# Patient Record
Sex: FEMALE | Race: BLACK OR AFRICAN AMERICAN | URBAN - METROPOLITAN AREA
[De-identification: names, ages, dates, MRNs, and addresses within clinical notes are randomized per-mention and may not be internally consistent; named-entity substitution may affect disease eponyms.]

---

## 2017-08-15 ENCOUNTER — APPOINTMENT (OUTPATIENT)
Dept: CARDIOLOGY | Facility: CLINIC | Age: 22
DRG: 176 | End: 2017-08-15
Attending: EMERGENCY MEDICINE
Payer: COMMERCIAL

## 2017-08-15 ENCOUNTER — APPOINTMENT (OUTPATIENT)
Dept: CT IMAGING | Facility: CLINIC | Age: 22
DRG: 176 | End: 2017-08-15
Attending: EMERGENCY MEDICINE
Payer: COMMERCIAL

## 2017-08-15 ENCOUNTER — APPOINTMENT (OUTPATIENT)
Dept: GENERAL RADIOLOGY | Facility: CLINIC | Age: 22
DRG: 176 | End: 2017-08-15
Attending: EMERGENCY MEDICINE
Payer: COMMERCIAL

## 2017-08-15 ENCOUNTER — HOSPITAL ENCOUNTER (INPATIENT)
Facility: CLINIC | Age: 22
LOS: 2 days | Discharge: HOME | DRG: 176 | End: 2017-08-19
Attending: EMERGENCY MEDICINE | Admitting: INTERNAL MEDICINE
Payer: COMMERCIAL

## 2017-08-15 DIAGNOSIS — F41.9 ANXIETY: ICD-10-CM

## 2017-08-15 DIAGNOSIS — Z30.011 ENCOUNTER FOR INITIAL PRESCRIPTION OF CONTRACEPTIVE PILLS: Primary | ICD-10-CM

## 2017-08-15 DIAGNOSIS — K59.03 DRUG-INDUCED CONSTIPATION: ICD-10-CM

## 2017-08-15 DIAGNOSIS — I26.99 PE (PULMONARY THROMBOEMBOLISM) (H): ICD-10-CM

## 2017-08-15 DIAGNOSIS — I26.99 OTHER PULMONARY EMBOLISM WITHOUT ACUTE COR PULMONALE, UNSPECIFIED CHRONICITY (H): ICD-10-CM

## 2017-08-15 DIAGNOSIS — R07.89 ATYPICAL CHEST PAIN: ICD-10-CM

## 2017-08-15 LAB
ALBUMIN SERPL-MCNC: 3.4 G/DL (ref 3.4–5)
ALP SERPL-CCNC: 58 U/L (ref 40–150)
ALT SERPL W P-5'-P-CCNC: 28 U/L (ref 0–50)
ANION GAP SERPL CALCULATED.3IONS-SCNC: 6 MMOL/L (ref 3–14)
APTT PPP: 40 SEC (ref 22–37)
APTT PPP: NORMAL SEC (ref 22–37)
AST SERPL W P-5'-P-CCNC: 26 U/L (ref 0–45)
BASOPHILS # BLD AUTO: 0 10E9/L (ref 0–0.2)
BASOPHILS # BLD AUTO: 0 10E9/L (ref 0–0.2)
BASOPHILS NFR BLD AUTO: 0.2 %
BASOPHILS NFR BLD AUTO: 0.3 %
BILIRUB DIRECT SERPL-MCNC: <0.1 MG/DL (ref 0–0.2)
BILIRUB SERPL-MCNC: 0.2 MG/DL (ref 0.2–1.3)
BUN SERPL-MCNC: 6 MG/DL (ref 7–30)
CALCIUM SERPL-MCNC: 8.4 MG/DL (ref 8.5–10.1)
CHLORIDE SERPL-SCNC: 106 MMOL/L (ref 94–109)
CO2 SERPL-SCNC: 27 MMOL/L (ref 20–32)
CREAT SERPL-MCNC: 0.62 MG/DL (ref 0.52–1.04)
D DIMER PPP FEU-MCNC: 3.2 UG/ML FEU (ref 0–0.5)
DIFFERENTIAL METHOD BLD: ABNORMAL
DIFFERENTIAL METHOD BLD: NORMAL
EOSINOPHIL # BLD AUTO: 0.4 10E9/L (ref 0–0.7)
EOSINOPHIL # BLD AUTO: 0.5 10E9/L (ref 0–0.7)
EOSINOPHIL NFR BLD AUTO: 6 %
EOSINOPHIL NFR BLD AUTO: 6.4 %
ERYTHROCYTE [DISTWIDTH] IN BLOOD BY AUTOMATED COUNT: 12.3 % (ref 10–15)
ERYTHROCYTE [DISTWIDTH] IN BLOOD BY AUTOMATED COUNT: 12.3 % (ref 10–15)
GFR SERPL CREATININE-BSD FRML MDRD: >90 ML/MIN/1.7M2
GLUCOSE SERPL-MCNC: 94 MG/DL (ref 70–99)
HCG UR QL: NEGATIVE
HCT VFR BLD AUTO: 34.5 % (ref 35–47)
HCT VFR BLD AUTO: 36.2 % (ref 35–47)
HGB BLD-MCNC: 11.8 G/DL (ref 11.7–15.7)
HGB BLD-MCNC: 12.6 G/DL (ref 11.7–15.7)
IMM GRANULOCYTES # BLD: 0 10E9/L (ref 0–0.4)
IMM GRANULOCYTES # BLD: 0 10E9/L (ref 0–0.4)
IMM GRANULOCYTES NFR BLD: 0.1 %
IMM GRANULOCYTES NFR BLD: 0.2 %
INR PPP: 1.14 (ref 0.86–1.14)
INR PPP: NORMAL (ref 0.86–1.14)
INTERNAL QC OK POCT: YES
INTERPRETATION ECG - MUSE: NORMAL
LYMPHOCYTES # BLD AUTO: 1.3 10E9/L (ref 0.8–5.3)
LYMPHOCYTES # BLD AUTO: 2.4 10E9/L (ref 0.8–5.3)
LYMPHOCYTES NFR BLD AUTO: 19.9 %
LYMPHOCYTES NFR BLD AUTO: 29.3 %
MCH RBC QN AUTO: 30.8 PG (ref 26.5–33)
MCH RBC QN AUTO: 31 PG (ref 26.5–33)
MCHC RBC AUTO-ENTMCNC: 34.2 G/DL (ref 31.5–36.5)
MCHC RBC AUTO-ENTMCNC: 34.8 G/DL (ref 31.5–36.5)
MCV RBC AUTO: 89 FL (ref 78–100)
MCV RBC AUTO: 90 FL (ref 78–100)
MONOCYTES # BLD AUTO: 0.4 10E9/L (ref 0–1.3)
MONOCYTES # BLD AUTO: 0.7 10E9/L (ref 0–1.3)
MONOCYTES NFR BLD AUTO: 6.1 %
MONOCYTES NFR BLD AUTO: 7.9 %
NEUTROPHILS # BLD AUTO: 4.3 10E9/L (ref 1.6–8.3)
NEUTROPHILS # BLD AUTO: 4.6 10E9/L (ref 1.6–8.3)
NEUTROPHILS NFR BLD AUTO: 56.5 %
NEUTROPHILS NFR BLD AUTO: 67.1 %
NRBC # BLD AUTO: 0 10*3/UL
NRBC # BLD AUTO: 0 10*3/UL
NRBC BLD AUTO-RTO: 0 /100
NRBC BLD AUTO-RTO: 0 /100
PLATELET # BLD AUTO: 185 10E9/L (ref 150–450)
PLATELET # BLD AUTO: 187 10E9/L (ref 150–450)
POTASSIUM SERPL-SCNC: 4.2 MMOL/L (ref 3.4–5.3)
PROT SERPL-MCNC: 7 G/DL (ref 6.8–8.8)
RADIOLOGIST FLAGS: ABNORMAL
RBC # BLD AUTO: 3.83 10E12/L (ref 3.8–5.2)
RBC # BLD AUTO: 4.06 10E12/L (ref 3.8–5.2)
RETICS # AUTO: 37.2 10E9/L (ref 25–95)
RETICS/RBC NFR AUTO: 1 % (ref 0.5–2)
SODIUM SERPL-SCNC: 138 MMOL/L (ref 133–144)
TROPONIN I SERPL-MCNC: <0.015 UG/L (ref 0–0.04)
WBC # BLD AUTO: 6.4 10E9/L (ref 4–11)
WBC # BLD AUTO: 8.2 10E9/L (ref 4–11)

## 2017-08-15 PROCEDURE — 99207 ZZC APP CREDIT; MD BILLING SHARED VISIT: CPT | Mod: 25 | Performed by: EMERGENCY MEDICINE

## 2017-08-15 PROCEDURE — 85610 PROTHROMBIN TIME: CPT | Performed by: EMERGENCY MEDICINE

## 2017-08-15 PROCEDURE — 99220 ZZC INITIAL OBSERVATION CARE,LEVL III: CPT | Mod: 25 | Performed by: FAMILY MEDICINE

## 2017-08-15 PROCEDURE — 36415 COLL VENOUS BLD VENIPUNCTURE: CPT | Performed by: FAMILY MEDICINE

## 2017-08-15 PROCEDURE — 25000128 H RX IP 250 OP 636: Performed by: STUDENT IN AN ORGANIZED HEALTH CARE EDUCATION/TRAINING PROGRAM

## 2017-08-15 PROCEDURE — 40000264 ECHO COMPLETE WITH OPTISON

## 2017-08-15 PROCEDURE — 96372 THER/PROPH/DIAG INJ SC/IM: CPT

## 2017-08-15 PROCEDURE — 85307 ASSAY ACTIVATED PROTEIN C: CPT | Performed by: NURSE PRACTITIONER

## 2017-08-15 PROCEDURE — 93010 ELECTROCARDIOGRAM REPORT: CPT | Mod: Z6 | Performed by: EMERGENCY MEDICINE

## 2017-08-15 PROCEDURE — 96376 TX/PRO/DX INJ SAME DRUG ADON: CPT

## 2017-08-15 PROCEDURE — 85300 ANTITHROMBIN III ACTIVITY: CPT | Performed by: NURSE PRACTITIONER

## 2017-08-15 PROCEDURE — 93005 ELECTROCARDIOGRAM TRACING: CPT

## 2017-08-15 PROCEDURE — 96361 HYDRATE IV INFUSION ADD-ON: CPT | Mod: 59

## 2017-08-15 PROCEDURE — 85307 ASSAY ACTIVATED PROTEIN C: CPT | Performed by: EMERGENCY MEDICINE

## 2017-08-15 PROCEDURE — 40000141 ZZH STATISTIC PERIPHERAL IV START W/O US GUIDANCE

## 2017-08-15 PROCEDURE — 96374 THER/PROPH/DIAG INJ IV PUSH: CPT

## 2017-08-15 PROCEDURE — 71260 CT THORAX DX C+: CPT

## 2017-08-15 PROCEDURE — 85730 THROMBOPLASTIN TIME PARTIAL: CPT | Performed by: EMERGENCY MEDICINE

## 2017-08-15 PROCEDURE — 87210 SMEAR WET MOUNT SALINE/INK: CPT | Performed by: STUDENT IN AN ORGANIZED HEALTH CARE EDUCATION/TRAINING PROGRAM

## 2017-08-15 PROCEDURE — 71020 XR CHEST 2 VW: CPT

## 2017-08-15 PROCEDURE — 25000128 H RX IP 250 OP 636: Performed by: EMERGENCY MEDICINE

## 2017-08-15 PROCEDURE — 25000125 ZZHC RX 250: Performed by: STUDENT IN AN ORGANIZED HEALTH CARE EDUCATION/TRAINING PROGRAM

## 2017-08-15 PROCEDURE — 85025 COMPLETE CBC W/AUTO DIFF WBC: CPT | Performed by: EMERGENCY MEDICINE

## 2017-08-15 PROCEDURE — 85045 AUTOMATED RETICULOCYTE COUNT: CPT | Performed by: NURSE PRACTITIONER

## 2017-08-15 PROCEDURE — 80048 BASIC METABOLIC PNL TOTAL CA: CPT | Performed by: EMERGENCY MEDICINE

## 2017-08-15 PROCEDURE — 25000132 ZZH RX MED GY IP 250 OP 250 PS 637: Performed by: EMERGENCY MEDICINE

## 2017-08-15 PROCEDURE — 85379 FIBRIN DEGRADATION QUANT: CPT | Performed by: EMERGENCY MEDICINE

## 2017-08-15 PROCEDURE — 80076 HEPATIC FUNCTION PANEL: CPT | Performed by: EMERGENCY MEDICINE

## 2017-08-15 PROCEDURE — 99207 ZZC APP CREDIT; MD BILLING SHARED VISIT: CPT | Mod: Z6 | Performed by: NURSE PRACTITIONER

## 2017-08-15 PROCEDURE — 25000128 H RX IP 250 OP 636: Performed by: NURSE PRACTITIONER

## 2017-08-15 PROCEDURE — 85730 THROMBOPLASTIN TIME PARTIAL: CPT | Performed by: FAMILY MEDICINE

## 2017-08-15 PROCEDURE — 93306 TTE W/DOPPLER COMPLETE: CPT | Mod: 26 | Performed by: INTERNAL MEDICINE

## 2017-08-15 PROCEDURE — 40000611 ZZHCL STATISTIC MORPHOLOGY W/INTERP HEMEPATH TC 85060: Performed by: NURSE PRACTITIONER

## 2017-08-15 PROCEDURE — 84484 ASSAY OF TROPONIN QUANT: CPT | Performed by: EMERGENCY MEDICINE

## 2017-08-15 PROCEDURE — 96375 TX/PRO/DX INJ NEW DRUG ADDON: CPT

## 2017-08-15 PROCEDURE — 36415 COLL VENOUS BLD VENIPUNCTURE: CPT | Performed by: NURSE PRACTITIONER

## 2017-08-15 PROCEDURE — 99285 EMERGENCY DEPT VISIT HI MDM: CPT | Mod: 25

## 2017-08-15 PROCEDURE — 25500064 ZZH RX 255 OP 636: Performed by: EMERGENCY MEDICINE

## 2017-08-15 PROCEDURE — 81025 URINE PREGNANCY TEST: CPT | Performed by: EMERGENCY MEDICINE

## 2017-08-15 PROCEDURE — 85610 PROTHROMBIN TIME: CPT | Performed by: FAMILY MEDICINE

## 2017-08-15 PROCEDURE — 25000132 ZZH RX MED GY IP 250 OP 250 PS 637: Performed by: NURSE PRACTITIONER

## 2017-08-15 PROCEDURE — G0378 HOSPITAL OBSERVATION PER HR: HCPCS

## 2017-08-15 PROCEDURE — 85025 COMPLETE CBC W/AUTO DIFF WBC: CPT | Performed by: NURSE PRACTITIONER

## 2017-08-15 PROCEDURE — 85300 ANTITHROMBIN III ACTIVITY: CPT | Performed by: EMERGENCY MEDICINE

## 2017-08-15 RX ORDER — LEVONORGESTREL/ETHIN.ESTRADIOL 0.1-0.02MG
1 TABLET ORAL DAILY
Status: ON HOLD | COMMUNITY
End: 2017-08-16

## 2017-08-15 RX ORDER — HYDROMORPHONE HYDROCHLORIDE 1 MG/ML
.3-.5 INJECTION, SOLUTION INTRAMUSCULAR; INTRAVENOUS; SUBCUTANEOUS
Status: DISCONTINUED | OUTPATIENT
Start: 2017-08-15 | End: 2017-08-16

## 2017-08-15 RX ORDER — HYDROMORPHONE HYDROCHLORIDE 1 MG/ML
.3-.5 INJECTION, SOLUTION INTRAMUSCULAR; INTRAVENOUS; SUBCUTANEOUS
Status: DISCONTINUED | OUTPATIENT
Start: 2017-08-15 | End: 2017-08-15

## 2017-08-15 RX ORDER — PROCHLORPERAZINE 25 MG
25 SUPPOSITORY, RECTAL RECTAL EVERY 12 HOURS PRN
Status: DISCONTINUED | OUTPATIENT
Start: 2017-08-15 | End: 2017-08-19 | Stop reason: HOSPADM

## 2017-08-15 RX ORDER — SODIUM CHLORIDE 9 MG/ML
1000 INJECTION, SOLUTION INTRAVENOUS CONTINUOUS
Status: DISCONTINUED | OUTPATIENT
Start: 2017-08-15 | End: 2017-08-16

## 2017-08-15 RX ORDER — KETOROLAC TROMETHAMINE 30 MG/ML
30 INJECTION, SOLUTION INTRAMUSCULAR; INTRAVENOUS ONCE
Status: COMPLETED | OUTPATIENT
Start: 2017-08-15 | End: 2017-08-15

## 2017-08-15 RX ORDER — DIPHENHYDRAMINE HCL 25 MG
25 CAPSULE ORAL EVERY 4 HOURS PRN
Status: DISCONTINUED | OUTPATIENT
Start: 2017-08-15 | End: 2017-08-17

## 2017-08-15 RX ORDER — ONDANSETRON 4 MG/1
4 TABLET, ORALLY DISINTEGRATING ORAL EVERY 6 HOURS PRN
Status: DISCONTINUED | OUTPATIENT
Start: 2017-08-15 | End: 2017-08-19 | Stop reason: HOSPADM

## 2017-08-15 RX ORDER — PROCHLORPERAZINE MALEATE 5 MG
5-10 TABLET ORAL EVERY 6 HOURS PRN
Status: DISCONTINUED | OUTPATIENT
Start: 2017-08-15 | End: 2017-08-19 | Stop reason: HOSPADM

## 2017-08-15 RX ORDER — KETOROLAC TROMETHAMINE 30 MG/ML
30 INJECTION, SOLUTION INTRAMUSCULAR; INTRAVENOUS EVERY 6 HOURS
Status: DISCONTINUED | OUTPATIENT
Start: 2017-08-15 | End: 2017-08-16

## 2017-08-15 RX ORDER — LIDOCAINE 40 MG/G
CREAM TOPICAL
Status: DISCONTINUED | OUTPATIENT
Start: 2017-08-15 | End: 2017-08-19 | Stop reason: HOSPADM

## 2017-08-15 RX ORDER — IOPAMIDOL 755 MG/ML
51 INJECTION, SOLUTION INTRAVASCULAR ONCE
Status: COMPLETED | OUTPATIENT
Start: 2017-08-15 | End: 2017-08-15

## 2017-08-15 RX ORDER — TRAMADOL HYDROCHLORIDE 50 MG/1
50 TABLET ORAL ONCE
Status: COMPLETED | OUTPATIENT
Start: 2017-08-15 | End: 2017-08-15

## 2017-08-15 RX ORDER — ONDANSETRON 2 MG/ML
4 INJECTION INTRAMUSCULAR; INTRAVENOUS EVERY 6 HOURS PRN
Status: DISCONTINUED | OUTPATIENT
Start: 2017-08-15 | End: 2017-08-17

## 2017-08-15 RX ORDER — ESCITALOPRAM OXALATE 5 MG/1
10 TABLET ORAL DAILY
Status: DISCONTINUED | OUTPATIENT
Start: 2017-08-15 | End: 2017-08-18

## 2017-08-15 RX ORDER — NALOXONE HYDROCHLORIDE 0.4 MG/ML
.1-.4 INJECTION, SOLUTION INTRAMUSCULAR; INTRAVENOUS; SUBCUTANEOUS
Status: DISCONTINUED | OUTPATIENT
Start: 2017-08-15 | End: 2017-08-19 | Stop reason: HOSPADM

## 2017-08-15 RX ORDER — OXYCODONE HYDROCHLORIDE 5 MG/1
5-10 TABLET ORAL EVERY 4 HOURS PRN
Status: DISCONTINUED | OUTPATIENT
Start: 2017-08-15 | End: 2017-08-18

## 2017-08-15 RX ORDER — LORAZEPAM 1 MG/1
1 TABLET ORAL 3 TIMES DAILY PRN
Status: DISCONTINUED | OUTPATIENT
Start: 2017-08-15 | End: 2017-08-17

## 2017-08-15 RX ORDER — ONDANSETRON 4 MG/1
4 TABLET, ORALLY DISINTEGRATING ORAL EVERY 8 HOURS PRN
Status: DISCONTINUED | OUTPATIENT
Start: 2017-08-15 | End: 2017-08-15

## 2017-08-15 RX ORDER — MORPHINE SULFATE 4 MG/ML
4 INJECTION, SOLUTION INTRAMUSCULAR; INTRAVENOUS
Status: COMPLETED | OUTPATIENT
Start: 2017-08-15 | End: 2017-08-15

## 2017-08-15 RX ORDER — ALBUTEROL SULFATE 90 UG/1
2 AEROSOL, METERED RESPIRATORY (INHALATION) EVERY 4 HOURS PRN
COMMUNITY

## 2017-08-15 RX ADMIN — OXYCODONE HYDROCHLORIDE 10 MG: 5 TABLET ORAL at 18:52

## 2017-08-15 RX ADMIN — LORAZEPAM 1 MG: 1 TABLET ORAL at 18:20

## 2017-08-15 RX ADMIN — DIPHENHYDRAMINE HYDROCHLORIDE 25 MG: 25 CAPSULE ORAL at 20:16

## 2017-08-15 RX ADMIN — KETOROLAC TROMETHAMINE 30 MG: 30 INJECTION, SOLUTION INTRAMUSCULAR at 11:03

## 2017-08-15 RX ADMIN — IOPAMIDOL 51 ML: 755 INJECTION, SOLUTION INTRAVENOUS at 12:07

## 2017-08-15 RX ADMIN — SODIUM CHLORIDE 1000 ML: 9 INJECTION, SOLUTION INTRAVENOUS at 11:03

## 2017-08-15 RX ADMIN — SODIUM CHLORIDE 1000 ML: 9 INJECTION, SOLUTION INTRAVENOUS at 18:13

## 2017-08-15 RX ADMIN — OMEPRAZOLE 40 MG: 20 CAPSULE, DELAYED RELEASE ORAL at 18:13

## 2017-08-15 RX ADMIN — ESCITALOPRAM OXALATE 10 MG: 10 TABLET ORAL at 18:13

## 2017-08-15 RX ADMIN — KETOROLAC TROMETHAMINE 30 MG: 30 INJECTION, SOLUTION INTRAMUSCULAR at 17:41

## 2017-08-15 RX ADMIN — ONDANSETRON 4 MG: 2 INJECTION INTRAMUSCULAR; INTRAVENOUS at 20:42

## 2017-08-15 RX ADMIN — HUMAN ALBUMIN MICROSPHERES AND PERFLUTREN 3 ML: 10; .22 INJECTION, SOLUTION INTRAVENOUS at 15:08

## 2017-08-15 RX ADMIN — KETOROLAC TROMETHAMINE 30 MG: 30 INJECTION, SOLUTION INTRAMUSCULAR at 23:14

## 2017-08-15 RX ADMIN — MORPHINE SULFATE 4 MG: 4 INJECTION, SOLUTION INTRAMUSCULAR; INTRAVENOUS at 16:33

## 2017-08-15 RX ADMIN — HYDROMORPHONE HYDROCHLORIDE 0.5 MG: 1 INJECTION, SOLUTION INTRAMUSCULAR; INTRAVENOUS; SUBCUTANEOUS at 18:13

## 2017-08-15 RX ADMIN — TRAMADOL HYDROCHLORIDE 50 MG: 50 TABLET, COATED ORAL at 12:38

## 2017-08-15 RX ADMIN — HYDROMORPHONE HYDROCHLORIDE 0.3 MG: 1 INJECTION, SOLUTION INTRAMUSCULAR; INTRAVENOUS; SUBCUTANEOUS at 21:35

## 2017-08-15 RX ADMIN — ENOXAPARIN SODIUM 50 MG: 60 INJECTION SUBCUTANEOUS at 17:45

## 2017-08-15 RX ADMIN — SODIUM CHLORIDE, PRESERVATIVE FREE 83 ML: 5 INJECTION INTRAVENOUS at 12:07

## 2017-08-15 ASSESSMENT — ACTIVITIES OF DAILY LIVING (ADL)
AMBULATION: 0-->INDEPENDENT
TRANSFERRING: 0-->INDEPENDENT
TOILETING: 0-->INDEPENDENT
COGNITION: 0 - NO COGNITION ISSUES REPORTED
RETIRED_EATING: 0-->INDEPENDENT
FALL_HISTORY_WITHIN_LAST_SIX_MONTHS: NO
RETIRED_COMMUNICATION: 0-->UNDERSTANDS/COMMUNICATES WITHOUT DIFFICULTY
SWALLOWING: 0-->SWALLOWS FOODS/LIQUIDS WITHOUT DIFFICULTY
DRESS: 0-->INDEPENDENT
BATHING: 0-->INDEPENDENT

## 2017-08-15 ASSESSMENT — ENCOUNTER SYMPTOMS
BACK PAIN: 1
NAUSEA: 1
WEAKNESS: 1
NUMBNESS: 1
SHORTNESS OF BREATH: 1
VOMITING: 1

## 2017-08-15 NOTE — IP AVS SNAPSHOT
MRN:[id number]                      After Visit Summary   8/15/2017    María Ortiz    MRN: [id number]           Thank you!     Thank you for choosing [facility] for your care. Our goal is always to provide you with excellent care. Hearing back from our patients is one way we can continue to improve our services. Please take a few minutes to complete the written survey that you may receive in the mail after you visit with us. Thank you!        Patient Information     Date Of Birth          1995        Designated Caregiver       Most Recent Value    Caregiver    Will someone help with your care after discharge? yes    Name of designated caregiver mother    Phone number of caregiver na    Caregiver address na      About your hospital stay     You were admitted on:  August 15, 2017 You last received care in the:  [address] [facility] [place]    You were discharged on:  August 19, 2017        Reason for your hospital stay       Dear María Ortiz    Your were hospitalized at [facility] with because you had blood clots in your lungs and you were having pain. You were treated with blood thinners and pain medications. Over your hospitalization you  improved and today you are ready to be discharged. Your pain should improve overtime. If you develop fever, shortness of breath, light headedness, worsening chest pain please seek medical attention.    We are suggesting the following medication changes:  1) Rivaroxaban 15mg twice daily for 21 days (up to September 5, 2017). There after take 20mgs daily until follow up with hematology in 6 months. Refills to be obtained from primary care.   2) Micronor (norethindrone) 0.35 mg daily. No missed days for birth control   3) Take tylenol 3 times daily, for chest pain.   4) Take oxycodone 5mg every 3 hours as needed for pain.  5) Apply diclofenac on the area of your chest as needed 4 times a day.   6) You can take flexeril if need for  muscle spasms or chest pain up to 3 times daily. Be aware that this medication can make you drowsy. Do not drive or operate machinery while on this medication.   7) You can take atarax 25mg up to 4 times daily as needed for anxiety.   8)Take miralax and senna-docusate as needed daily for constipation, which is likely related to your use of oxycodone.  9) Your dose of es citalopram was increased to 20mg daily           Please set up an appointment with:  1)Your primary care within 7 days to discuss your hospitalization and new medications.   2) Follow up with hematology/oncology in 6 months to discuss need for continued anticoagulation. Clinic phone number: [phone]    It was a pleasure meeting with you today. Thank you for allowing me and my team the privilege of caring for you today. You are the reason we are here, and I truly hope we provided you with the excellent service you deserve. Please let us know if there is anything else we can do for you so that we can be sure you are leaving completely satisfied with your care experience.    Take care!  Rose Villalba MD  Department of Medicine  [facility]                  Who to Call     For medical emergencies, please call 911.  For non-urgent questions about your medical care, please call your primary care provider or clinic, [phone]          Attending Provider     Provider Specialty    Kadi Gutiérrez MD Emergency Medicine    [facility], Fide Mendoza MD Emergency Medicine    Junaid Robison MD [facility], Jacob Villar MD Internal Medicine       Primary Care Provider Office Phone # Fax #    Lindsay Ley [phone]       When to contact your care team       Return to the ER if you have worsening pain, worsening breathing, fever, fainting, uncontrollable bleeding                  After Care Instructions     Activity       Your activity upon discharge: activity as tolerated            Diet       Follow this diet  "upon discharge: Orders Placed This Encounter      Regular Diet Adult                  Follow-up Appointments     Adult [facility] Follow-up and recommended labs and tests       Follow up with primary care provider, Lindsay Ley, within 7 days for hospital follow- up.      Follow up with hematology in 6 months. [phone]    Appointments on [place] and/or [facility] (with [facility] or [facility] provider or service). Call [phone] if you haven't heard regarding these appointments within 7 days of discharge.                  Further instructions from your care team       Center for bleeding and clotting #[phone]    Call [facility]ist to get IUD placed: #[phone]       Pending Results     Date and Time Order Name Status Description    8/16/2017 0001 Protein S Antigen Total and Free In process     8/15/2017 4792 Factor 5 Leiden Mutation Analysis In process             Statement of Approval     Ordered          08/19/17 9095  I have reviewed and agree with all the recommendations and orders detailed in this document.  EFFECTIVE NOW     Associated Diagnoses:  PE (pulmonary thromboembolism) (H) [I26.99]    Approved and electronically signed by:  Rose Villalba MD           08/16/17 6436  I have reviewed and agree with all the recommendations and orders detailed in this document.  EFFECTIVE NOW     Approved and electronically signed by:  Ria Hylton APRN CNP             Admission Information     Date & Time Provider Department Dept. Phone    8/15/2017 Jacob Weathers MD [address] [facility] [place] [phone]      Your Vitals Were     Blood Pressure Pulse Temperature Respirations Height Weight    113/63 (BP Location: Left arm) 58 97.8  F (36.6  C) (Oral) 12 1.575 m (5' 2\") 55.2 kg (121 lb 11.2 oz)    Pulse Oximetry BMI (Body Mass Index)                94% 22.26 kg/m2          MyChart Information     Simple-Fill lets you send messages to your doctor, view your test results, renew your " "prescriptions, schedule appointments and more. To sign up, go to [email or web address] . Click on \"Log in\" on the left side of the screen, which will take you to the Welcome page. Then click on \"Sign up Now\" on the right side of the page.     You will be asked to enter the access code listed below, as well as some personal information. Please follow the directions to create your username and password.     Your access code is: [email or web address]  Expires: 2017  3:19 PM     Your access code will  in 90 days. If you need help or a new code, please call your [facility] clinic or [phone].        Care EveryWhere ID     This is your Care EveryWhere ID. This could be used by other organizations to access your [facility] medical records  [id number]        Equal Access to Services     JACQUE MCGUIRE : Casey Kuo, matt galvan, cristo loza, juan diego cordova . So [facility] [phone].    ATENCIÓN: Si habla español, tiene a reardon disposición servicios gratuitos de asistencia lingüística. Eliana al [phone].    We comply with applicable federal civil rights laws and [place] laws. We do not discriminate on the basis of race, color, national origin, age, disability sex, sexual orientation or gender identity.               Review of your medicines      START taking        Dose / Directions    acetaminophen 500 MG tablet   Commonly known as:  TYLENOL        Dose:  1000 mg   Take 2 tablets (1,000 mg) by mouth 3 times daily   Quantity:  30 tablet   Refills:  0       cyclobenzaprine 10 MG tablet   Commonly known as:  FLEXERIL   Used for:  Atypical chest pain        Dose:  10 mg   Take 1 tablet (10 mg) by mouth 3 times daily as needed for muscle spasms   Quantity:  15 tablet   Refills:  0       diclofenac 1 % Gel topical gel   Commonly known as:  VOLTAREN   Used for:  Atypical chest pain        Dose:  2 g   Place 2 g onto the skin 4 times daily Apply to areas of pain 4 " times daily as needed   Quantity:  100 g   Refills:  0       hydrOXYzine 25 MG tablet   Commonly known as:  ATARAX   Used for:  Anxiety        Dose:  25 mg   Take 1 tablet (25 mg) by mouth every 4 hours as needed for anxiety   Quantity:  30 tablet   Refills:  0       norethindrone 0.35 MG per tablet   Commonly known as:  MICRONOR   Used for:  Encounter for initial prescription of contraceptive pills        Dose:  1 tablet   Take 1 tablet (0.35 mg) by mouth daily   Quantity:  28 tablet   Refills:  1       oxyCODONE 5 MG IR tablet   Commonly known as:  ROXICODONE   Used for:  Atypical chest pain        Dose:  5 mg   Take 1 tablet (5 mg) by mouth every 3 hours as needed for severe pain 8/19 (up to 8 pills total in one day, with each one being 3 hours apart or longer between doses) 8/20 ( up to 7 pillsin one day, with each one being 3 hours apart or longer between doses) 8/21 (up to 6 pills in one day, with each one being 3 hours apart or longer between doses) 8/22 (up to 5 pills in one day, with each one being 3 hours apart or longer between doses)   Quantity:  26 tablet   Refills:  0       polyethylene glycol Packet   Commonly known as:  MIRALAX/GLYCOLAX   Used for:  Drug-induced constipation        Dose:  17 g   Take 17 g by mouth as needed for constipation   Quantity:  7 packet   Refills:  0       rivaroxaban ANTICOAGULANT 15 MG Tabs tablet   Commonly known as:  XARELTO   Used for:  Other pulmonary embolism without acute cor pulmonale, unspecified chronicity (H)        Dose:  15 mg   Take 1 tablet (15 mg) by mouth 2 times daily (with meals) Take 15mg two times daily for 21 days. There after take 20mg daily.   Quantity:  37 tablet   Refills:  0       senna-docusate 8.6-50 MG per tablet   Commonly known as:  SENOKOT-S;PERICOLACE   Used for:  Drug-induced constipation        Dose:  1 tablet   Take 1 tablet by mouth 2 times daily as needed for constipation   Quantity:  10 tablet   Refills:  0         CONTINUE these  medicines which may have CHANGED, or have new prescriptions. If we are uncertain of the size of tablets/capsules you have at home, strength may be listed as something that might have changed.        Dose / Directions    escitalopram 20 MG tablet   Commonly known as:  LEXAPRO   This may have changed:    - medication strength  - how much to take   Used for:  Anxiety        Dose:  20 mg   Take 1 tablet (20 mg) by mouth daily   Quantity:  30 tablet   Refills:  0         CONTINUE these medicines which have NOT CHANGED        Dose / Directions    albuterol 108 (90 BASE) MCG/ACT Inhaler   Commonly known as:  PROAIR HFA/PROVENTIL HFA/VENTOLIN HFA        Dose:  2 puff   Inhale 2 puffs into the lungs every 4 hours as needed for shortness of breath / dyspnea or wheezing   Refills:  0       DICYCLOMINE HCL PO        Dose:  10-20 mg   Take 10-20 mg by mouth 2 times daily as needed   Refills:  0       NAPROXEN PO        Dose:  250 mg   Take 250 mg by mouth 3 times daily as needed for moderate pain   Refills:  0       OMEPRAZOLE PO        Dose:  40 mg   Take 40 mg by mouth every morning   Refills:  0       WOMENS MULTIVITAMIN PLUS PO        Dose:  2 tablet   Take 2 tablets by mouth daily   Refills:  0       ZOFRAN ODT PO        Dose:  4 mg   Take 4 mg by mouth every 8 hours as needed for nausea   Refills:  0         STOP taking     ATIVAN PO           levonorgestrel-ethinyl estradiol 0.1-20 MG-MCG per tablet   Commonly known as:  CHARLEE CEDILLO LESSINA                Where to get your medicines      These medications were sent to [facility] [place], [place] - [address]  [address], [facility] [address]     Phone:  [phone]     acetaminophen 500 MG tablet    escitalopram 20 MG tablet    polyethylene glycol Packet    senna-docusate 8.6-50 MG per tablet         Some of these will need a paper prescription and others can be bought over the counter. Ask your nurse if you have questions.      Bring a paper prescription for each of these medications     cyclobenzaprine 10 MG tablet    diclofenac 1 % Gel topical gel    hydrOXYzine 25 MG tablet    norethindrone 0.35 MG per tablet    oxyCODONE 5 MG IR tablet    rivaroxaban ANTICOAGULANT 15 MG Tabs tablet                Protect others around you: Learn how to safely use, store and throw away your medicines at www.disposemymeds.org.             Medication List: This is a list of all your medications and when to take them. Check marks below indicate your daily home schedule. Keep this list as a reference.      Medications           Morning Afternoon Evening Bedtime As Needed    acetaminophen 500 MG tablet   Commonly known as:  TYLENOL   Take 2 tablets (1,000 mg) by mouth 3 times daily   Last time this was given:  1,000 mg on 8/19/2017  7:47 AM                                albuterol 108 (90 BASE) MCG/ACT Inhaler   Commonly known as:  PROAIR HFA/PROVENTIL HFA/VENTOLIN HFA   Inhale 2 puffs into the lungs every 4 hours as needed for shortness of breath / dyspnea or wheezing                                cyclobenzaprine 10 MG tablet   Commonly known as:  FLEXERIL   Take 1 tablet (10 mg) by mouth 3 times daily as needed for muscle spasms   Last time this was given:  10 mg on 8/19/2017  6:37 AM                                diclofenac 1 % Gel topical gel   Commonly known as:  VOLTAREN   Place 2 g onto the skin 4 times daily Apply to areas of pain 4 times daily as needed   Last time this was given:  2 g on 8/19/2017  7:49 AM                                DICYCLOMINE HCL PO   Take 10-20 mg by mouth 2 times daily as needed                                escitalopram 20 MG tablet   Commonly known as:  LEXAPRO   Take 1 tablet (20 mg) by mouth daily   Last time this was given:  20 mg on 8/19/2017  7:47 AM                                hydrOXYzine 25 MG tablet   Commonly known as:  ATARAX   Take 1 tablet (25 mg) by mouth every 4 hours as needed for anxiety   Last time  this was given:  25 mg on 8/19/2017  7:46 AM                                NAPROXEN PO   Take 250 mg by mouth 3 times daily as needed for moderate pain                                norethindrone 0.35 MG per tablet   Commonly known as:  MICRONOR   Take 1 tablet (0.35 mg) by mouth daily   Last time this was given:  0.35 mg on 8/18/2017  4:57 PM                                OMEPRAZOLE PO   Take 40 mg by mouth every morning   Last time this was given:  40 mg on 8/19/2017  7:47 AM                                oxyCODONE 5 MG IR tablet   Commonly known as:  ROXICODONE   Take 1 tablet (5 mg) by mouth every 3 hours as needed for severe pain 8/19 (up to 8 pills total in one day, with each one being 3 hours apart or longer between doses) 8/20 ( up to 7 pillsin one day, with each one being 3 hours apart or longer between doses) 8/21 (up to 6 pills in one day, with each one being 3 hours apart or longer between doses) 8/22 (up to 5 pills in one day, with each one being 3 hours apart or longer between doses)   Last time this was given:  10 mg on 8/19/2017 11:48 AM                                polyethylene glycol Packet   Commonly known as:  MIRALAX/GLYCOLAX   Take 17 g by mouth as needed for constipation   Last time this was given:  17 g on 8/19/2017  7:49 AM                                rivaroxaban ANTICOAGULANT 15 MG Tabs tablet   Commonly known as:  XARELTO   Take 1 tablet (15 mg) by mouth 2 times daily (with meals) Take 15mg two times daily for 21 days. There after take 20mg daily.   Last time this was given:  15 mg on 8/19/2017  7:48 AM                                senna-docusate 8.6-50 MG per tablet   Commonly known as:  SENOKOT-S;PERICOLACE   Take 1 tablet by mouth 2 times daily as needed for constipation   Last time this was given:  1 tablet on 8/19/2017  7:47 AM                                WOMENS MULTIVITAMIN PLUS PO   Take 2 tablets by mouth daily                                ZOFRAN ODT PO   Take 4 mg  by mouth every 8 hours as needed for nausea

## 2017-08-15 NOTE — IP AVS SNAPSHOT
[address] [address]    Phone:  [phone]                                       After Visit Summary   8/15/2017    María Ortiz    MRN: [id number]           After Visit Summary Signature Page     I have received my discharge instructions, and my questions have been answered. I have discussed any challenges I see with this plan with the nurse or doctor.    ..........................................................................................................................................  Patient/Patient Representative Signature      ..........................................................................................................................................  Patient Representative Print Name and Relationship to Patient    ..................................................               ................................................  Date                                            Time    ..........................................................................................................................................  Reviewed by Signature/Title    ...................................................              ..............................................  Date                                                            Time

## 2017-08-15 NOTE — H&P
[facility] ED Observation Admission Note    Chief Complaint   Patient presents with     Chest Wall Pain     Pt reports rib pain front L radiating to back and SOA that increases w/ laying flat or moving. Pt reports pain/symptoms similar to past costochondritis symptoms no relief w/ heat and naproxen. + L arm weakness/achy       Assessment/Plan:  1. Extensive bilateral pulmonary emboli. - Seen on CT chest pulmonary. No evidence of right heart strain. Reported Left lateral chest pain. Not hypoxic. EKG normal. D dimer 3.2. HCG negative. Echo completed Global and regional left ventricular function is normal with an EF of 55-60%.The right ventricle is normal size. Global right ventricular function is Normal. No significant valvular abnormalities were noted.  /77 HR 78 Temp 98.7 SPO2 99. Received Lovenox in the ED. Patient's pain was managed in the ED with Toradol, Morphine, and Tramadol.   - Start Xarelto 15 mg BID x 21 days then 20 mg daily with food.  - Pain management with Toradol q 6 hours prn, Oxycodone prn and Dilaudid prn  - Benadryl for itching.   - Hematology consult as patient does not have a history of clots and is not aware of any family history of clots  - add on Peripheral smear, Protein C/S, Factor 5 Leiden Antithrombin 3 and INR/PTT  - Follow in in center for bleeding and clotting disorders.     2. Dysmenorrhea - Reprts dehydration, chills, vomiting and severe abdominal cramping  when she gets her menstrual cycle when she isn't on birth control. She takes Levonorgestrel-ethinyl estradiol.   - Hold home birth control  - OB/GYN consult for recommendations on birth control while admitted    3. IBS  - On bentyl    4. GERD scheduled for an upper GI scope in September  Currently takes Zofran, Prilosec, and dicyclomine    5. Depression/Anxiety - Continue on home Lexapro and Ativan prn.       HPI:  (adapted from HPI from ED)   María Ortiz is a 21 year old female with a history of depression, anxiety,  costochondritis, IBS and acid reflux who presents to the Emergency Department for evaluation of left rib pain that radiates around her ribs into the left side of her back. Patient reports this pain is similar to past costochondritis symptoms. She reports she first started experiencing episodes of costochondritis in 5/2016 and this episodes started on 8/12/2017 (3 days ago). Patient reports using a heat pad and naproxen with no relief of her pain. Patient reports using 3, 20 mg tablets of naproxen when she has a flare up of pain. She notes increased pain with breathing and movement which is causing shortness of breath. She also notes some mild increase in pain with palpation. She also complains of some numbness and aching in her left arm when she has a flare up of pain in her ribs. Patient reports having nausea and vomiting recently with her last episode of vomiting on 8/12/2017. She denies currently having any nausea. Patient denies any recent long trips, recent long drives or any smoking. She denies any blood clotting disorders. She denies any known family history of clotting disorders.     In the ED   Vitals:BP: 117/77 Pulse: 91 Temp: 98.7 Resp: 18 SP02:99 % Labs:  Sodium 138, Potassium 4.2, Creatinine 0.62, GFR >90, LFTS stable. HCG negative, WBC 6.4, Hgb 12.6, Plt 187, Troponin negative, D dimer 3.2  Medications:Received Lovenox in the ED. Patient's pain was managed in the ED with Toradol, Morphine, and Tramadol.    Imaging:Extensive bilateral pulmonary emboli. No evidence of right heart Strain. Bilateral lower lobe peripheral infarcts. Sub-4 mm nodular nodules along the left major fissure may represent fissural lymph nodes and do not require any additional routine follow-up in a low risk patient per Fleischner criteria guidelines  Echo completed Global and regional left ventricular function is normal with an EF of 55-60%.The right ventricle is normal size. Global right ventricular function is Normal. No  significant valvular abnormalities were noted. Consults: Gyn/Onc and Hematology  Plan:  Patient will be admitted to the observation unit to be started on anticoagulation and to be monitored in the hospital overnight.    On admission to the observation unit the patient  Reported left sided chest pain.     History:    Past Medical History:   Diagnosis Date     Anxiety      Depressive disorder      IBS (irritable bowel syndrome)        Past Surgical History:   Procedure Laterality Date     ORTHOPEDIC SURGERY      R shoulder 2012       No family history on file.    Social History     Social History     Marital status: Single     Spouse name: N/A     Number of children: N/A     Years of education: N/A     Occupational History     Not on file.     Social History Main Topics     Smoking status: Never Smoker     Smokeless tobacco: Not on file     Alcohol use Not on file     Drug use: Not on file     Sexual activity: Not on file     Other Topics Concern     Not on file     Social History Narrative     No narrative on file         No current facility-administered medications on file prior to encounter.   No current outpatient prescriptions on file prior to encounter.    Data:    Results for orders placed or performed during the hospital encounter of 08/15/17   XR Chest 2 Views    Narrative    XR CHEST 2 VW  8/15/2017 11:42 AM      HISTORY: left sided chest pain    COMPARISON: 12/7/2010    FINDINGS: PA and lateral views of the chest upright. The  cardiomediastinal silhouette and pulmonary vasculature are stable and  within normal limits. There is no focal airspace opacity, pleural  effusion, or pneumothorax. The visualized upper abdomen, osseous  structures, and soft tissues are unremarkable.      Impression    IMPRESSION: Clear lungs.     I have personally reviewed the examination and initial interpretation  and I agree with the findings.    FERMIN HOBBS MD   CT Chest Pulmonary Embolism w Contrast   Result Value Ref  Range    Radiologist flags Bilateral pulmonary emboli (AA)     Addendum: 8/15/2017    Addendum:    Three-dimensional (3D) post-processed angiographic images were  reconstructed, archived to PACS and used in interpretation of this  study.    ANNETTE HAWKINS MD      Narrative    Exam: Chest CT Pulmonary Angiogram 8/15/2017 12:57 PM..    History: Left lateral chest pain.    Comparison: None.    Technique: Volumetric helical acquisition of the chest was obtained  following pulmonary embolism protocol.     Findings:    There is adequate opacification of the pulmonary arteries. There are  bilateral central pulmonary artery embolisms including the left lower  lobe lobar pulmonary artery, which is near occlusive, extending into  lower lobe segmental branches, and extending partially into the left  upper lobe pulmonary artery. There is additionally a right pulmonary  artery lobar branch embolism extending into the superior segmental  branches and lower lobe segmental branches. There is somewhat  increased attenuation of the anterior upper mediastinal fat, which is  likely non-opacified innominate vessels, or possibly thymic tissue  anteriorly.    No evidence of right heart strain. No significant mediastinal  lymphadenopathy.    Bilateral wedge-shaped peripheral lower lobe opacities. Bibasilar  subsegmental atelectasis. Sub-4 mm nodular opacities along the left  major fissure (series 7, image 138).    Limited visualized upper abdomen. Splenule.    No suspicious bony lesions.       Impression    Impression:   1. Extensive bilateral pulmonary emboli. No evidence of right heart  strain.  2. Bilateral lower lobe peripheral infarcts.  3. Sub-4 mm nodular nodules along the left major fissure may represent  fissural lymph nodes and do not require any additional routine  follow-up in a low risk patient per Fleischner criteria guidelines.    [Critical Result: Bilateral pulmonary emboli]    Finding was identified on 8/15/2017 12:51 PM.      Dr. Gutiérrez was contacted by Dr. Zamudio at 8/15/2017 12:52 PM and  verbalized understanding of the critical finding.     I have personally reviewed the examination and initial interpretation  and I agree with the findings.    ANNETTE HAWKINS MD   CBC with platelets differential   Result Value Ref Range    WBC 6.4 4.0 - 11.0 10e9/L    RBC Count 4.06 3.8 - 5.2 10e12/L    Hemoglobin 12.6 11.7 - 15.7 g/dL    Hematocrit 36.2 35.0 - 47.0 %    MCV 89 78 - 100 fl    MCH 31.0 26.5 - 33.0 pg    MCHC 34.8 31.5 - 36.5 g/dL    RDW 12.3 10.0 - 15.0 %    Platelet Count 187 150 - 450 10e9/L    Diff Method Automated Method     % Neutrophils 67.1 %    % Lymphocytes 19.9 %    % Monocytes 6.1 %    % Eosinophils 6.4 %    % Basophils 0.3 %    % Immature Granulocytes 0.2 %    Nucleated RBCs 0 0 /100    Absolute Neutrophil 4.3 1.6 - 8.3 10e9/L    Absolute Lymphocytes 1.3 0.8 - 5.3 10e9/L    Absolute Monocytes 0.4 0.0 - 1.3 10e9/L    Absolute Eosinophils 0.4 0.0 - 0.7 10e9/L    Absolute Basophils 0.0 0.0 - 0.2 10e9/L    Abs Immature Granulocytes 0.0 0 - 0.4 10e9/L    Absolute Nucleated RBC 0.0    D dimer quantitative   Result Value Ref Range    D Dimer 3.2 (H) 0.0 - 0.50 ug/ml FEU   Basic metabolic panel   Result Value Ref Range    Sodium 138 133 - 144 mmol/L    Potassium 4.2 3.4 - 5.3 mmol/L    Chloride 106 94 - 109 mmol/L    Carbon Dioxide 27 20 - 32 mmol/L    Anion Gap 6 3 - 14 mmol/L    Glucose 94 70 - 99 mg/dL    Urea Nitrogen 6 (L) 7 - 30 mg/dL    Creatinine 0.62 0.52 - 1.04 mg/dL    GFR Estimate >90 >60 mL/min/1.7m2    GFR Estimate If Black >90 >60 mL/min/1.7m2    Calcium 8.4 (L) 8.5 - 10.1 mg/dL   Hepatic panel   Result Value Ref Range    Bilirubin Direct <0.1 0.0 - 0.2 mg/dL    Bilirubin Total 0.2 0.2 - 1.3 mg/dL    Albumin 3.4 3.4 - 5.0 g/dL    Protein Total 7.0 6.8 - 8.8 g/dL    Alkaline Phosphatase 58 40 - 150 U/L    ALT 28 0 - 50 U/L    AST 26 0 - 45 U/L   Troponin I   Result Value Ref Range    Troponin I ES <0.015 0.000 -  0.045 ug/L   EKG 12-lead, tracing only   Result Value Ref Range    Interpretation ECG Click View Image link to view waveform and result    hCG qual urine POCT   Result Value Ref Range    HCG Qual Urine Negative neg    Internal QC OK Yes    ECHO COMPLETE WITH OPTISON    Narrative    [id number]  [id number]  [id number]  [id number]^FIDEL^EDDIE^TRUE           [facility],[place]  Echocardiography Laboratory  [address] [place] [address]     Name: MORENA PALMER  MRN: [id number]  : 1995  Study Date: 08/15/2017 02:33 PM  Age: 21 yrs  Gender: Female  Patient Location: [facility]  Reason For Study: Pulmonary Embolism  Ordering Physician: EDDIE PARRA  Performed By: Andrea Mares RDCS     BSA: 1.5 m2  Height: 62 in  Weight: 119 lb  BP: 119/87 mmHg  _____________________________________________________________________________  __        Procedure  Complete Portable Echo Adult. Contrast Optison. Optison (NDC #4321-9150-27)  given intravenously. Patient was given 3 ml mixture of 3 ml Optison and 6 ml  saline. 6 ml wasted.  _____________________________________________________________________________  __        Interpretation Summary  Global and regional left ventricular function is normal with an EF of 55-60%.  The right ventricle is normal size. Global right ventricular function is  normal.  No significant valvular abnormalities were noted.  Right ventricular systolic pressure is 18mmHg above the right atrial pressure  (Pulmonary artery pressure is normal)  The inferior vena cava was normal in size with preserved respiratory  variability.  No pericardial effusion is present.  Previous study not available for comparison.  _____________________________________________________________________________  __        Left Ventricle  Left ventricular size is normal. Left ventricular wall thickness is normal.  Global and regional left ventricular function is normal with an EF of 55-60%.  Normal left  ventricular filling for age.     Right Ventricle  The right ventricle is normal size. Global right ventricular function is  normal.     Atria  Both atria appear normal.        Mitral Valve  The mitral valve is normal. Trace mitral insufficiency is present.     Aortic Valve  Aortic valve is normal in structure and function. The aortic valve is  tricuspid.     Tricuspid Valve  The tricuspid valve is normal. Trace tricuspid insufficiency is present. Right  ventricular systolic pressure is 18mmHg above the right atrial pressure.     Pulmonic Valve  The pulmonic valve is normal.     Vessels  The inferior vena cava was normal in size with preserved respiratory  variability. The aorta root is normal. Estimated mean right atrial pressure is  3 mmHg.     Pericardium  No pericardial effusion is present.        Compared to Previous Study  Previous study not available for comparison.  _____________________________________________________________________________  __     MMode/2D Measurements & Calculations  IVSd: 0.67 cm  LVIDd: 4.6 cm  LVIDs: 3.2 cm  LVPWd: 0.55 cm  FS: 30.4 %  EDV(Teich): 97.3 ml  ESV(Teich): 41.0 ml  LV mass(C)d: 83.7 grams  LV mass(C)dI: 54.6 grams/m2  Ao root diam: 2.9 cm  LVOT diam: 1.8 cm  LVOT area: 2.5 cm2        Doppler Measurements & Calculations  MV E max nichole: 84.1 cm/sec  MV A max nichole: 54.0 cm/sec  MV E/A: 1.6  MV dec time: 0.16 sec  PA acc time: 0.20 sec  TR max nichole: 209.0 cm/sec  TR max P.5 mmHg  Lateral E/e': 6.3  Medial E/e': 10.1              _____________________________________________________________________________  __        Report approved by: Robert Li 08/15/2017 04:17 PM                EKG Interpretation:      Interpreted by Kadi Gutiérrez  Time reviewed: 1000  Symptoms at time of EKG: none   Rhythm: normal sinus   Rate: normal  Axis: normal  Ectopy: none  Conduction: normal  ST Segments/ T Waves: No ST-T wave changes  Q Waves: none  Comparison to prior: No old EKG  available     Clinical Impression: normal EKG    ROS:    Review Of Systems  Skin: negative  Eyes: negative  Ears/Nose/Throat: negative  Respiratory: Shortness of breath  Cardiovascular: chest pain  Gastrointestinal: nausea and vomiting   Genitourinary: negative  Musculoskeletal: Left rib pain  Neurologic: negative  Psychiatric: Depression and anxiety   Hematologic/Lymphatic/Immunologic: negative  Endocrine: negative      10 point ROS negative other than the symptoms noted above.      Exam:    Vitals:  B/P: 127/97, T: 98, P: 91, R: 20    Constitutional: healthy, alert and in distress (in pain during examination)   Head: Normocephalic. No masses, lesions, tenderness or abnormalities   Neck: Neck supple. No adenopathy. Thyroid symmetric, normal size,, Carotids without bruits.   ENT: ENT exam normal, no neck nodes or sinus tenderness   Cardiovascular: RRR. No murmurs, clicks gallops or rub   Respiratory: Breath sound normal. Lungs clear   Gastrointestinal: Abdomen soft,. BS normal. No masses, organomegaly.   : Deferred   Musculoskeletal: extremities normal- no gross deformities noted, gait normal and normal muscle tone No calf tenderness bilaterally.   Skin: no suspicious lesions or rashes   Neurologic: Gait normal. Reflexes normal and symmetric. Sensation grossly WNL.   Psychiatric: mentation appears normal and affect normal/bright   Hematologic/Lymphatic/Immunologic: normal ant/post cervical, axillary, supraclavicular and inguinal     Consults:  Hematology/ OB/GYN  FEN: Regular   DVT prophylaxis: Lovenox/Xarelto  Disposition: Plan will be to discharge her home tomorrow with recommendations to follow-up in the blood clotting clinic    Signed:  LIZANDRO Bahena, NP  Emergency Department  [phone] Ex [id number]  August 15, 2017 at 20:30 PM

## 2017-08-15 NOTE — PLAN OF CARE
Problem: Discharge Planning  Goal: Discharge Planning (Adult, OB, Behavioral, Peds)  Outpatient/Observation goals to be met before discharge home:      Observation goals PRIOR TO DISCHARGE     Comments: - Stable Cardiopulmonary status   - Vital signs stable   RR increased but stable, unchanged  - Anticoagulation initiated and able to administer upon discharge no  - Follow up for bleeding and clotting disorders set up no      Hematology & gyn consults pending

## 2017-08-15 NOTE — PHARMACY-ADMISSION MEDICATION HISTORY
Admission medication history interview status for the 8/15/2017 admission is complete. See Epic admission navigator for allergy information, pharmacy, prior to admission medications and immunization status.     Medication history interview sources:  Patient    Changes made to PTA medication list (reason)  Added: Albuterol, Womens multivitamin and Naproxen  Deleted: None  Changed: Dicyclomine instructions changed from 10mg BID to 10-20mg BID PRN. Lorazepam instructions changed from 1mg daily to 1mg TID PRN.     Additional medication history information (including reliability of information, actions taken by pharmacist):    1. Patient has the following prescription bottles with her: dicyclomine, escitalopram, lorazepam, and omeprazole. She does NOT have the birth control with her.         Prior to Admission medications    Medication Sig Last Dose Taking? Auth Provider   LORazepam (ATIVAN PO) Take 1 mg by mouth 3 times daily as needed  8/14/2017 at prn Yes Reported, Patient   Escitalopram Oxalate (LEXAPRO PO) Take 10 mg by mouth daily  8/14/2017 at 700 Yes Reported, Patient   OMEPRAZOLE PO Take 40 mg by mouth every morning 8/14/2017 at 700 Yes Reported, Patient   DICYCLOMINE HCL PO Take 10-20 mg by mouth 2 times daily as needed  8/14/2017 at 700 Yes Reported, Patient   Ondansetron (ZOFRAN ODT PO) Take 4 mg by mouth every 8 hours as needed for nausea Past Week at prn Yes Reported, Patient   levonorgestrel-ethinyl estradiol (AVIANE,ALESSE,LESSINA) 0.1-20 MG-MCG per tablet Take 1 tablet by mouth daily 8/14/2017 at 700 Yes Reported, Patient   albuterol (PROAIR HFA/PROVENTIL HFA/VENTOLIN HFA) 108 (90 BASE) MCG/ACT Inhaler Inhale 2 puffs into the lungs every 4 hours as needed for shortness of breath / dyspnea or wheezing Past Month at prn Yes Unknown, Entered By History   NAPROXEN PO Take 250 mg by mouth 3 times daily as needed for moderate pain 8/15/2017 at 700 Yes Unknown, Entered By History   Multiple Vitamins-Minerals  (WOMENS MULTIVITAMIN PLUS PO) Take 2 tablets by mouth daily 8/14/2017 at 700 Yes Unknown, Entered By History         Medication history completed by: Keri Cash, PharmD Student

## 2017-08-15 NOTE — PROGRESS NOTES
"Arrived on 6d. BP (!) 127/97  Pulse 91  Temp 98  F (36.7  C) (Oral)  Resp 20  Ht 1.575 m (5' 2\")  Wt 54 kg (119 lb)  SpO2 99%  BMI 21.77 kg/m2  A/O.  Cp unchanged, worsens w/ exertion.    "

## 2017-08-15 NOTE — ED PROVIDER NOTES
[place] EMERGENCY DEPARTMENT ([facility])  8/15/17   History     Chief Complaint   Patient presents with     Chest Wall Pain     Pt reports rib pain front L radiating to back and SOA that increases w/ laying flat or moving. Pt reports pain/symptoms similar to past costochondritis symptoms no relief w/ heat and naproxen. + L arm weakness/achy     HPI  María Ortiz is a 21 year old female with a history of costochondritis, IBS and acid reflux who presents to the Emergency Department for evaluation of left rib pain that radiates around her ribs into the left side of her back. Patient reports this pain is similar to past costochondritis symptoms. She reports she first started experiencing episodes of costochondritis in 5/2016 and this episodes started on 8/12/2017 (3 days ago). Patient reports using a heat pad and naproxen with no relief of her pain. Patient reports using 3, 20 mg tablets of naproxen when she has a flare up of pain. She notes increased pain with breathing and movement which is causing shortness of breath. She also notes some mild increase in pain with palpation. She also complains of some numbness and aching in her left arm when she has a flare up of pain in her ribs. Patient reports having nausea and vomiting recently with her last episode of vomiting on 8/12/2017. She denies currently having any nausea. Patient denies any recent long trips, recent long drives or any smoking.    I have reviewed the Medications, Allergies, Past Medical and Surgical History, and Social History in the Th[facility] system.    Past Medical History:   Diagnosis Date     Anxiety      Depressive disorder      IBS (irritable bowel syndrome)        Past Surgical History:   Procedure Laterality Date     ORTHOPEDIC SURGERY      R shoulder 2012       No family history on file.    Social History   Substance Use Topics     Smoking status: Never Smoker     Smokeless tobacco: Not on file     Alcohol use Not on file       Current  "Facility-Administered Medications   Medication     0.9% sodium chloride BOLUS    Followed by     0.9% sodium chloride infusion     ketorolac (TORADOL) injection 30 mg     morphine (PF) injection 4 mg     Current Outpatient Prescriptions   Medication     LORazepam (ATIVAN PO)     Escitalopram Oxalate (LEXAPRO PO)     OMEPRAZOLE PO     DICYCLOMINE HCL PO     Ondansetron (ZOFRAN ODT PO)     levonorgestrel-ethinyl estradiol (AVIANE,ALESSE,LESSINA) 0.1-20 MG-MCG per tablet      No Known Allergies      Review of Systems   Respiratory: Positive for shortness of breath (due to pain).    Gastrointestinal: Positive for nausea (improved) and vomiting (improved).   Musculoskeletal: Positive for back pain (radiating from left ribs).        Positive for left ribs pain  Positive for left arm pain with rib pain flare up   Neurological: Positive for weakness (left arm with pain flare up) and numbness (left arm with pain flare up).   All other systems reviewed and are negative.      Physical Exam   BP: 117/77  Pulse: 91  Temp: 98.7  F (37.1  C)  Resp: 18  Height: 157.5 cm (5' 2\")  Weight: 54 kg (119 lb)  SpO2: 99 %  Physical Exam   Constitutional: She is oriented to person, place, and time. She appears well-developed and well-nourished.   HENT:   Head: Normocephalic and atraumatic.   Neck: Normal range of motion.   Cardiovascular: Normal rate, regular rhythm and normal heart sounds.    Pulmonary/Chest: Effort normal and breath sounds normal. No respiratory distress. She has no wheezes. She has no rales. She exhibits tenderness (tender left lateral chest wall; ).   Abdominal: Soft. She exhibits no distension. There is no tenderness. There is no rebound.   Musculoskeletal: She exhibits no tenderness.   Neurological: She is alert and oriented to person, place, and time.   Skin: Skin is warm and dry.   Psychiatric: She has a normal mood and affect. Her behavior is normal. Thought content normal.       ED Course   10:13 AM  The patient " was seen and examined by Kadi Gutiérrez MD in Room ED06.     ED Course     Procedures             EKG Interpretation:      Interpreted by Kadi Gutiérrez  Time reviewed: 1000  Symptoms at time of EKG: none   Rhythm: normal sinus   Rate: normal  Axis: normal  Ectopy: none  Conduction: normal  ST Segments/ T Waves: No ST-T wave changes  Q Waves: none  Comparison to prior: No old EKG available    Clinical Impression: normal EKG            Critical Care time:  none         Results for orders placed or performed during the hospital encounter of 08/15/17   XR Chest 2 Views    Narrative    XR CHEST 2 VW  8/15/2017 11:42 AM      HISTORY: left sided chest pain    COMPARISON: 12/7/2010    FINDINGS: PA and lateral views of the chest upright. The  cardiomediastinal silhouette and pulmonary vasculature are stable and  within normal limits. There is no focal airspace opacity, pleural  effusion, or pneumothorax. The visualized upper abdomen, osseous  structures, and soft tissues are unremarkable.      Impression    IMPRESSION: Clear lungs.     I have personally reviewed the examination and initial interpretation  and I agree with the findings.    FERMIN HOBBS MD   CT Chest Pulmonary Embolism w Contrast   Result Value Ref Range    Radiologist flags Bilateral pulmonary emboli (AA)     Addendum: 8/15/2017    Addendum:    Three-dimensional (3D) post-processed angiographic images were  reconstructed, archived to PACS and used in interpretation of this  study.    ANNETTE HAWKINS MD      Narrative    Exam: Chest CT Pulmonary Angiogram 8/15/2017 12:57 PM..    History: Left lateral chest pain.    Comparison: None.    Technique: Volumetric helical acquisition of the chest was obtained  following pulmonary embolism protocol.     Findings:    There is adequate opacification of the pulmonary arteries. There are  bilateral central pulmonary artery embolisms including the left lower  lobe lobar pulmonary artery, which is near occlusive,  extending into  lower lobe segmental branches, and extending partially into the left  upper lobe pulmonary artery. There is additionally a right pulmonary  artery lobar branch embolism extending into the superior segmental  branches and lower lobe segmental branches. There is somewhat  increased attenuation of the anterior upper mediastinal fat, which is  likely non-opacified innominate vessels, or possibly thymic tissue  anteriorly.    No evidence of right heart strain. No significant mediastinal  lymphadenopathy.    Bilateral wedge-shaped peripheral lower lobe opacities. Bibasilar  subsegmental atelectasis. Sub-4 mm nodular opacities along the left  major fissure (series 7, image 138).    Limited visualized upper abdomen. Splenule.    No suspicious bony lesions.       Impression    Impression:   1. Extensive bilateral pulmonary emboli. No evidence of right heart  strain.  2. Bilateral lower lobe peripheral infarcts.  3. Sub-4 mm nodular nodules along the left major fissure may represent  fissural lymph nodes and do not require any additional routine  follow-up in a low risk patient per Fleischner criteria guidelines.    [Critical Result: Bilateral pulmonary emboli]    Finding was identified on 8/15/2017 12:51 PM.     Dr. Gutiérrez was contacted by Dr. Zamudio at 8/15/2017 12:52 PM and  verbalized understanding of the critical finding.     I have personally reviewed the examination and initial interpretation  and I agree with the findings.    ANNETTE HAWKINS MD   CBC with platelets differential   Result Value Ref Range    WBC 6.4 4.0 - 11.0 10e9/L    RBC Count 4.06 3.8 - 5.2 10e12/L    Hemoglobin 12.6 11.7 - 15.7 g/dL    Hematocrit 36.2 35.0 - 47.0 %    MCV 89 78 - 100 fl    MCH 31.0 26.5 - 33.0 pg    MCHC 34.8 31.5 - 36.5 g/dL    RDW 12.3 10.0 - 15.0 %    Platelet Count 187 150 - 450 10e9/L    Diff Method Automated Method     % Neutrophils 67.1 %    % Lymphocytes 19.9 %    % Monocytes 6.1 %    % Eosinophils 6.4 %    %  Basophils 0.3 %    % Immature Granulocytes 0.2 %    Nucleated RBCs 0 0 /100    Absolute Neutrophil 4.3 1.6 - 8.3 10e9/L    Absolute Lymphocytes 1.3 0.8 - 5.3 10e9/L    Absolute Monocytes 0.4 0.0 - 1.3 10e9/L    Absolute Eosinophils 0.4 0.0 - 0.7 10e9/L    Absolute Basophils 0.0 0.0 - 0.2 10e9/L    Abs Immature Granulocytes 0.0 0 - 0.4 10e9/L    Absolute Nucleated RBC 0.0    D dimer quantitative   Result Value Ref Range    D Dimer 3.2 (H) 0.0 - 0.50 ug/ml FEU   Basic metabolic panel   Result Value Ref Range    Sodium 138 133 - 144 mmol/L    Potassium 4.2 3.4 - 5.3 mmol/L    Chloride 106 94 - 109 mmol/L    Carbon Dioxide 27 20 - 32 mmol/L    Anion Gap 6 3 - 14 mmol/L    Glucose 94 70 - 99 mg/dL    Urea Nitrogen 6 (L) 7 - 30 mg/dL    Creatinine 0.62 0.52 - 1.04 mg/dL    GFR Estimate >90 >60 mL/min/1.7m2    GFR Estimate If Black >90 >60 mL/min/1.7m2    Calcium 8.4 (L) 8.5 - 10.1 mg/dL   Hepatic panel   Result Value Ref Range    Bilirubin Direct <0.1 0.0 - 0.2 mg/dL    Bilirubin Total 0.2 0.2 - 1.3 mg/dL    Albumin 3.4 3.4 - 5.0 g/dL    Protein Total 7.0 6.8 - 8.8 g/dL    Alkaline Phosphatase 58 40 - 150 U/L    ALT 28 0 - 50 U/L    AST 26 0 - 45 U/L   Troponin I   Result Value Ref Range    Troponin I ES <0.015 0.000 - 0.045 ug/L   EKG 12-lead, tracing only   Result Value Ref Range    Interpretation ECG Click View Image link to view waveform and result    hCG qual urine POCT   Result Value Ref Range    HCG Qual Urine Negative neg    Internal QC OK Yes    ECHO COMPLETE WITH OPTISON    Narrative    [id number]  [id number]  [id number]  [id number]^FIDEL^EDDIE^TRUE           [facility]  Echocardiography Laboratory  [address] [place] [id number]     Name: MORENA PALMER  MRN: [id number]  : 1995  Study Date: 08/15/2017 02:33 PM  Age: 21 yrs  Gender: Female  Patient Location: [facility]  Reason For Study: Pulmonary Embolism  Ordering Physician: EDDIE PARRA  Performed By: Andrea Mares,  RDCS     BSA: 1.5 m2  Height: 62 in  Weight: 119 lb  BP: 119/87 mmHg  _____________________________________________________________________________  __        Procedure  Complete Portable Echo Adult. Contrast Optison. Optison (NDC #8204-6633-26)  given intravenously. Patient was given 3 ml mixture of 3 ml Optison and 6 ml  saline. 6 ml wasted.  _____________________________________________________________________________  __        Interpretation Summary  Global and regional left ventricular function is normal with an EF of 55-60%.  The right ventricle is normal size. Global right ventricular function is  normal.  No significant valvular abnormalities were noted.  Right ventricular systolic pressure is 18mmHg above the right atrial pressure  (Pulmonary artery pressure is normal)  The inferior vena cava was normal in size with preserved respiratory  variability.  No pericardial effusion is present.  Previous study not available for comparison.  _____________________________________________________________________________  __        Left Ventricle  Left ventricular size is normal. Left ventricular wall thickness is normal.  Global and regional left ventricular function is normal with an EF of 55-60%.  Normal left ventricular filling for age.     Right Ventricle  The right ventricle is normal size. Global right ventricular function is  normal.     Atria  Both atria appear normal.        Mitral Valve  The mitral valve is normal. Trace mitral insufficiency is present.     Aortic Valve  Aortic valve is normal in structure and function. The aortic valve is  tricuspid.     Tricuspid Valve  The tricuspid valve is normal. Trace tricuspid insufficiency is present. Right  ventricular systolic pressure is 18mmHg above the right atrial pressure.     Pulmonic Valve  The pulmonic valve is normal.     Vessels  The inferior vena cava was normal in size with preserved respiratory  variability. The aorta root is normal. Estimated mean  right atrial pressure is  3 mmHg.     Pericardium  No pericardial effusion is present.        Compared to Previous Study  Previous study not available for comparison.  _____________________________________________________________________________  __     MMode/2D Measurements & Calculations  IVSd: 0.67 cm  LVIDd: 4.6 cm  LVIDs: 3.2 cm  LVPWd: 0.55 cm  FS: 30.4 %  EDV(Teich): 97.3 ml  ESV(Teich): 41.0 ml  LV mass(C)d: 83.7 grams  LV mass(C)dI: 54.6 grams/m2  Ao root diam: 2.9 cm  LVOT diam: 1.8 cm  LVOT area: 2.5 cm2        Doppler Measurements & Calculations  MV E max nichole: 84.1 cm/sec  MV A max nichole: 54.0 cm/sec  MV E/A: 1.6  MV dec time: 0.16 sec  PA acc time: 0.20 sec  TR max nicohle: 209.0 cm/sec  TR max P.5 mmHg  Lateral E/e': 6.3  Medial E/e': 10.1              _____________________________________________________________________________  __        Report approved by: Robert Li 08/15/2017 04:17 PM        Medications   0.9% sodium chloride BOLUS (1,000 mLs Intravenous New Bag 8/15/17 1103)     Followed by   0.9% sodium chloride infusion (not administered)   morphine (PF) injection 4 mg (4 mg Intravenous Not Given 8/15/17 1105)   sodium chloride 0.9 % for CT scan flush dose 83 mL (83 mLs Intravenous Given 8/15/17 1207)   ketorolac (TORADOL) injection 30 mg (30 mg Intravenous Given 8/15/17 1103)   iopamidol (ISOVUE-370) solution 51 mL (51 mLs Intravenous Given 8/15/17 1207)   traMADol (ULTRAM) tablet 50 mg (50 mg Oral Given 8/15/17 1238)   perflutren diluted 1mL to 2mL with saline (OPTISON) diluted injection 4 mL (3 mLs Intravenous Given 8/15/17 1508)          Labs Ordered and Resulted from Time of ED Arrival Up to the Time of Departure from the ED - No data to display     Results for orders placed or performed during the hospital encounter of 08/15/17 (from the past 24 hour(s))   EKG 12-lead, tracing only   Result Value Ref Range    Interpretation ECG Click View Image link to view waveform and result                Assessments & Plan (with Medical Decision Making)   21-year-old female who presented to the ER complaining of left lateral chest pain.  Patient said it felt similar to her costochondritis, but the Naproxen she was taking was unable to relieve her pain.  Patient initially has stable vital signs.  She was not hypoxic and not tachycardic and was well-appearing on exam.  She did not have any reproducible pain with palpation. I obtained an EKG, which was negative. Labs showed that her d-dimer was elevated.  Subsequently obtained a CT chest that shows she has bilateral PEs in the lobar region.  No signs of heart strain.  Patient s PESI score is a level one so she is very low risk for morbidity associated with her PEs. Case was discussed with the hospitalist, Dr. Manning, who recommended observation care based on the intensity of service that she would require at the hospital.  I did order an echocardiogram and will add a troponin to make sure there are no other signs of heart strain.  Case was discussed with the ELIE on 6D.  Patient will be admitted to the observation unit to be started on anticoagulation and to be monitored in the hospital overnight.  The patient is doing well and her pain controlled. Plan will be to discharge her home tomorrow with recommendations to follow-up in the blood clotting clinic.    This part of the document was transcribed by Dayne Abbasi, Medical Scribe.      I have reviewed the nursing notes.    I have reviewed the findings, diagnosis, plan and need for follow up with the patient.    New Prescriptions    No medications on file       Final diagnoses:   Other pulmonary embolism without acute cor pulmonale, unspecified chronicity (H)     I, Kendrick Milian, am serving as a trained medical scribe to document services personally performed by Kadi Gutiérrez MD, based on the provider's statements to me.   IKadi MD, was physically present and have reviewed and verified the  accuracy of this note documented by Kendrick Milian.    8/15/2017   [facility], [place], EMERGENCY DEPARTMENT     Kadi Gutiérrez MD  08/15/17 0414

## 2017-08-15 NOTE — ED NOTES
Pt reports rib pain front L radiating to back and SOA that increases w/ laying flat or moving. Pt reports pain/symptoms similar to past costochondritis symptoms no relief w/ heat and naproxen. + L arm weakness/achy

## 2017-08-16 LAB
ANION GAP SERPL CALCULATED.3IONS-SCNC: 5 MMOL/L (ref 3–14)
APCR PPP: NORMAL {RATIO}
AT III ACT/NOR PPP CHRO: NORMAL % (ref 85–135)
BUN SERPL-MCNC: 3 MG/DL (ref 7–30)
CALCIUM SERPL-MCNC: 8.2 MG/DL (ref 8.5–10.1)
CHLORIDE SERPL-SCNC: 109 MMOL/L (ref 94–109)
CO2 SERPL-SCNC: 24 MMOL/L (ref 20–32)
COPATH REPORT: NORMAL
CREAT SERPL-MCNC: 0.65 MG/DL (ref 0.52–1.04)
ERYTHROCYTE [DISTWIDTH] IN BLOOD BY AUTOMATED COUNT: 12.5 % (ref 10–15)
GFR SERPL CREATININE-BSD FRML MDRD: >90 ML/MIN/1.7M2
GLUCOSE SERPL-MCNC: 83 MG/DL (ref 70–99)
HCT VFR BLD AUTO: 34.3 % (ref 35–47)
HGB BLD-MCNC: 11.7 G/DL (ref 11.7–15.7)
MCH RBC QN AUTO: 30.8 PG (ref 26.5–33)
MCHC RBC AUTO-ENTMCNC: 34.1 G/DL (ref 31.5–36.5)
MCV RBC AUTO: 90 FL (ref 78–100)
PLATELET # BLD AUTO: 176 10E9/L (ref 150–450)
POTASSIUM SERPL-SCNC: 4.6 MMOL/L (ref 3.4–5.3)
RBC # BLD AUTO: 3.8 10E12/L (ref 3.8–5.2)
SODIUM SERPL-SCNC: 138 MMOL/L (ref 133–144)
SPECIMEN SOURCE: NORMAL
WBC # BLD AUTO: 6.2 10E9/L (ref 4–11)
WET PREP SPEC: NORMAL

## 2017-08-16 PROCEDURE — 96361 HYDRATE IV INFUSION ADD-ON: CPT

## 2017-08-16 PROCEDURE — 85305 CLOT INHIBIT PROT S TOTAL: CPT | Performed by: NURSE PRACTITIONER

## 2017-08-16 PROCEDURE — 99225 ZZC SUBSEQUENT OBSERVATION CARE,LEVEL II: CPT | Mod: Z6 | Performed by: EMERGENCY MEDICINE

## 2017-08-16 PROCEDURE — 25000128 H RX IP 250 OP 636: Performed by: NURSE PRACTITIONER

## 2017-08-16 PROCEDURE — 85027 COMPLETE CBC AUTOMATED: CPT | Performed by: NURSE PRACTITIONER

## 2017-08-16 PROCEDURE — G0378 HOSPITAL OBSERVATION PER HR: HCPCS

## 2017-08-16 PROCEDURE — 36415 COLL VENOUS BLD VENIPUNCTURE: CPT | Performed by: NURSE PRACTITIONER

## 2017-08-16 PROCEDURE — 25000132 ZZH RX MED GY IP 250 OP 250 PS 637: Performed by: NURSE PRACTITIONER

## 2017-08-16 PROCEDURE — 25000128 H RX IP 250 OP 636: Performed by: EMERGENCY MEDICINE

## 2017-08-16 PROCEDURE — 80048 BASIC METABOLIC PNL TOTAL CA: CPT | Performed by: NURSE PRACTITIONER

## 2017-08-16 PROCEDURE — 85306 CLOT INHIBIT PROT S FREE: CPT | Performed by: NURSE PRACTITIONER

## 2017-08-16 PROCEDURE — 96376 TX/PRO/DX INJ SAME DRUG ADON: CPT

## 2017-08-16 RX ORDER — OXYCODONE HYDROCHLORIDE 5 MG/1
5-10 TABLET ORAL EVERY 4 HOURS PRN
Qty: 30 TABLET | Refills: 0 | Status: SHIPPED | OUTPATIENT
Start: 2017-08-16 | End: 2017-08-19

## 2017-08-16 RX ORDER — ACETAMINOPHEN AND CODEINE PHOSPHATE 120; 12 MG/5ML; MG/5ML
1 SOLUTION ORAL DAILY
Status: DISCONTINUED | OUTPATIENT
Start: 2017-08-16 | End: 2017-08-19 | Stop reason: HOSPADM

## 2017-08-16 RX ORDER — ACETAMINOPHEN AND CODEINE PHOSPHATE 120; 12 MG/5ML; MG/5ML
1 SOLUTION ORAL DAILY
Qty: 28 TABLET | Refills: 3 | Status: SHIPPED | OUTPATIENT
Start: 2017-08-16 | End: 2017-08-19

## 2017-08-16 RX ORDER — ACETAMINOPHEN 325 MG/1
650 TABLET ORAL EVERY 4 HOURS
Status: DISCONTINUED | OUTPATIENT
Start: 2017-08-16 | End: 2017-08-17

## 2017-08-16 RX ORDER — DOCUSATE SODIUM 100 MG/1
100 CAPSULE, LIQUID FILLED ORAL 2 TIMES DAILY PRN
Status: DISCONTINUED | OUTPATIENT
Start: 2017-08-16 | End: 2017-08-19 | Stop reason: HOSPADM

## 2017-08-16 RX ADMIN — ACETAMINOPHEN AND CODEINE PHOSPHATE 0.35 MG: 120; 12 SOLUTION ORAL at 16:01

## 2017-08-16 RX ADMIN — KETOROLAC TROMETHAMINE 30 MG: 30 INJECTION, SOLUTION INTRAMUSCULAR at 12:34

## 2017-08-16 RX ADMIN — DIPHENHYDRAMINE HYDROCHLORIDE 25 MG: 25 CAPSULE ORAL at 17:49

## 2017-08-16 RX ADMIN — OMEPRAZOLE 40 MG: 20 CAPSULE, DELAYED RELEASE ORAL at 08:32

## 2017-08-16 RX ADMIN — OXYCODONE HYDROCHLORIDE 10 MG: 5 TABLET ORAL at 04:33

## 2017-08-16 RX ADMIN — OXYCODONE HYDROCHLORIDE 10 MG: 5 TABLET ORAL at 09:42

## 2017-08-16 RX ADMIN — ESCITALOPRAM OXALATE 10 MG: 10 TABLET ORAL at 08:32

## 2017-08-16 RX ADMIN — HYDROMORPHONE HYDROCHLORIDE 0.5 MG: 1 INJECTION, SOLUTION INTRAMUSCULAR; INTRAVENOUS; SUBCUTANEOUS at 01:04

## 2017-08-16 RX ADMIN — DOCUSATE SODIUM 100 MG: 100 CAPSULE, LIQUID FILLED ORAL at 17:46

## 2017-08-16 RX ADMIN — DIPHENHYDRAMINE HYDROCHLORIDE 25 MG: 25 CAPSULE ORAL at 01:08

## 2017-08-16 RX ADMIN — LORAZEPAM 1 MG: 1 TABLET ORAL at 15:12

## 2017-08-16 RX ADMIN — DIPHENHYDRAMINE HYDROCHLORIDE 25 MG: 25 CAPSULE ORAL at 08:33

## 2017-08-16 RX ADMIN — ACETAMINOPHEN 650 MG: 325 TABLET, FILM COATED ORAL at 21:02

## 2017-08-16 RX ADMIN — OXYCODONE HYDROCHLORIDE 10 MG: 5 TABLET ORAL at 13:43

## 2017-08-16 RX ADMIN — SODIUM CHLORIDE 1000 ML: 9 INJECTION, SOLUTION INTRAVENOUS at 00:12

## 2017-08-16 RX ADMIN — ACETAMINOPHEN 650 MG: 325 TABLET, FILM COATED ORAL at 16:10

## 2017-08-16 RX ADMIN — ONDANSETRON 4 MG: 2 INJECTION INTRAMUSCULAR; INTRAVENOUS at 01:52

## 2017-08-16 RX ADMIN — RIVAROXABAN 15 MG: 15 TABLET, FILM COATED ORAL at 17:46

## 2017-08-16 RX ADMIN — OXYCODONE HYDROCHLORIDE 10 MG: 5 TABLET ORAL at 00:11

## 2017-08-16 RX ADMIN — KETOROLAC TROMETHAMINE 30 MG: 30 INJECTION, SOLUTION INTRAMUSCULAR at 05:49

## 2017-08-16 RX ADMIN — RIVAROXABAN 15 MG: 15 TABLET, FILM COATED ORAL at 08:32

## 2017-08-16 RX ADMIN — SODIUM CHLORIDE 1000 ML: 9 INJECTION, SOLUTION INTRAVENOUS at 08:31

## 2017-08-16 RX ADMIN — DIPHENHYDRAMINE HYDROCHLORIDE 25 MG: 25 CAPSULE ORAL at 13:24

## 2017-08-16 ASSESSMENT — PAIN DESCRIPTION - DESCRIPTORS
DESCRIPTORS: ACHING
DESCRIPTORS: ACHING
DESCRIPTORS: DULL;ACHING
DESCRIPTORS: DISCOMFORT;ACHING

## 2017-08-16 NOTE — PLAN OF CARE
Problem: Discharge Planning  Goal: Discharge Planning (Adult, OB, Behavioral, Peds)  Observation goals PRIOR TO DISCHARGE     Comments: - Stable Cardiopulmonary status: yes, will change from lovenox to xeroleto in the am  - Vital signs stable : yes  - Anticoagulation initiated and able to administer upon discharge: no  - Follow up for bleeding and clotting disorders set up: no       Hematology & gyn consults pending

## 2017-08-16 NOTE — PLAN OF CARE
Problem: Goal Outcome Summary  Goal: Goal Outcome Summary  desat to 88% when falling asleep 1L nc placed, per pt oxygen seemed to help with breathing. Tele NSR,  HR 49-70's. Received scheduled toradol, prn oxy and dilaudid for left rib pain. Pt continues to be itching, improved after benadryl and lotion. No rashes seen. zofran given form emesis with relief. Independently in room, RICHARDSON. PIV infusing at 125cc/hr. Continue with POC.

## 2017-08-16 NOTE — PLAN OF CARE
Problem: Discharge Planning  Goal: Discharge Planning (Adult, OB, Behavioral, Peds)  Outpatient/Observation goals to be met before discharge home:        - Vital signs stable: yes, weaned oxygen, 97% on RA   - Anticoagulation initiated and able to administer upon discharge: yes

## 2017-08-16 NOTE — PROGRESS NOTES
Gyn progress note    Patient name: María Ortiz  MRN: [id number]  : 1995    S: Patient continues to feel some chest pain, some shortness of breath. She thought about it overnight and would like a Mirena IUD    O:   Vitals:    17 0221 17 0442 17 0445 17 0605   BP: 110/67   101/67   Pulse:       Resp:    Temp: 98  F (36.7  C)   98.1  F (36.7  C)   TempSrc: Oral   Oral   SpO2: 100% (!) 88% 99% 100%   Weight:       Height:         Gen: comfortable appearing, no distress. Nasal cannula in place  Abdomen: soft, nontender, nondistended    A/P: 21 year old female here with pulmonary embolisms on OCPs  - Agree with plan for Mirena  -- Please call [phone] to make appointment at [facility] at time of discharge for IUD placement.  -- Patient can be started on micronor, progesterone only until time of IUD placement.  -- The Mirena IUD can be placed at any time, we do not need to wait for a withdrawal bleed.     OBGYN will sign off. Please call or page with questions or concerns.    Jacinta Parmar MD 2017 8:05 AM   Gynecology Pager: [phone]

## 2017-08-16 NOTE — PROGRESS NOTES
Pt had emesis; medicated with 4mg zofran.  Pt also c/o generalized itching, especially over face, head, and back; received benadryl 1hr ago.  Will continue to monitor.

## 2017-08-16 NOTE — PROGRESS NOTES
[facility]  Daily Progress Note          Assessment & Plan:   María Ortiz is a 21 year old female with a history of depression, anxiety, costochondritis, IBS and acid reflux who presents to the Emergency Department for evaluation of left rib pain that radiates around her ribs into the left side of her back. CT chest showing PE.    1. Extensive bilateral pulmonary emboli. - Seen on CT chest pulmonary. No evidence of right heart strain. Reported Left lateral chest pain. Not hypoxic. EKG normal. D dimer 3.2. HCG negative. Echo completed Global and regional left ventricular function is normal with an EF of 55-60%.The right ventricle is normal size. Global right ventricular function is Normal. No significant valvular abnormalities were noted.  VSS stable today, afebrile, O2 sats normal on room air. Mother notes that patient is sleepy and O2 sats drop when she falls asleep.  Patient ok to discharge, but patient and mother feel uncomfortable with this as she is still so sleepy with some pain.  Hematology consulted and recommended Xarelto for 6 months.  Follow up with hematology in 6 months prior to stopping Xarelto.   - Start Xarelto 15 mg BID x 21 days then 20 mg daily with food.  - Pain management with scheduled tylenol 650 mg po Q 4 hours, oxycodone prn  - Follow in in center for bleeding and clotting disorders in 6 months, PCP in one week     2. Dysmenorrhea - Reprts dehydration, chills, vomiting and severe abdominal cramping  when she gets her menstrual cycle when she isn't on birth control. She takes Levonorgestrel-ethinyl estradiol. OB GYN recommended micronor (progesterone only) birth control until IUD can get placed.  - start micronor today  - given # to make appt with OB GYN for IUD     3. IBS  - On bentyl     4. GERD scheduled for an upper GI scope in September  Currently takes Zofran, Prilosec, and dicyclomine     5. Depression/Anxiety - Continue on home Lexapro and  "Ativan prn.     FEN: Regular  Lines: PIV  Prophylaxis: Xarelto, PPI          Consults:   OB GYN, hematology         Discharge Planning:   Tomorrow am        Interval History:   María is doing ok today, no nausea or vomiting, itching is getting better.  Slightly sleepy.  Has pain with deep breathing.  Feeling anxious about going home, would like to stay tonight.    ROS:   Constitutional: No fevers/chills. Tolerating diet.   Cardiovascular: +chest pain, no palpitations.   Respiratory: No cough or SOB.   GI: No abdominal pain. No N/V.      : No urinary complaints.   Musculoskeletal: no pain         Physical Exam:   /90  Pulse 91  Temp 98.7  F (37.1  C) (Oral)  Resp 16  Ht 1.575 m (5' 2\")  Wt 54 kg (119 lb)  SpO2 100%  BMI 21.77 kg/m2     GENERAL: Alert and oriented x 3. NAD.   HEENT: Anicteric sclera. Mucous membranes moist.   CV: RRR. S1, S2. No murmurs appreciated.   RESPIRATORY: Effort normal. Lungs CTAB with no wheezing, rales, rhonchi.   GI: Abdomen soft and non distended with normoactive bowel sounds present in all quadrants. No tenderness, rebound, guarding.   NEUROLOGICAL: No focal deficits. Moves all extremities.    EXTREMITIES: No peripheral edema. Intact bilateral pedal pulses.   SKIN: No jaundice. No rashes.     Medication list reviewed.   Labs and imaging were reviewed.     LIZANDRO Quinones, CNP  Ascom #[id number]      "

## 2017-08-16 NOTE — CONSULTS
"[facility] [place]    Hematology Consultation    Date of Admission:  8/15/2017    Assessment & Plan   María Ortiz is a 21 year old female who was admitted on 8/15/2017. I was asked to see the patient for PE.    Possible provoked PE vs unprovoked PE: 8/15/17 on chest CT angio b/l and extensive with presentation of pain and sob.  She has been \"sick\" for the past couple of months and is on an estrogen containing but for 10 years.  Usually happens in the first 2 years.  At this time due to the extent of the clot we recommend 6 months of treatment which at that time she will follow up with Dr. Cain to determine the risks and benefits of continuing treatment.  Her family history will play a role possibly in this decision.  We did discuss with patient the risks of bleeding associated with rivaroxaban and to avoid contact sports.  We reported that she may have increased menstrual bleeding due to the medication.      Although with patients family history and possibly unprovoked clot we do not recommend thrombophilia testing for inherited disorders.  This is mostly because it will not change our management decisions.  Please refer to allison amaya Guidance for the evaluation and treatment of hereditary and acquired thrombophilia. Journal of Thrombosis and Thrombolysis  January 2016, Volume 41, Issue 1, pp 154-164.    Plan  -- continue rivaroxaban for at least 6 months  -- our clinic will contact patient with FU with Dr. Cain.  -- please on discharge give patient our clinic number if she has complications or further questions.  [facility] [phone].    Case and plan discussed with Dr. Payton Weller MD  PGY4  Hematology Oncology Fellow    Reason for Consult   Reason for consult: PE while on OCP    Primary Care Physician   Lindsay Ley    Chief Complaint   CP    History of Present Illness   María Ortiz is a 21 year old female with a history of " depression, anxiety, costochondritis, IBS and acid reflux who presents to the Emergency Department for evaluation of left rib pain that radiates around her ribs into the left side of her back. Patient reports this pain is similar to past costochondritis symptoms. She reports she first started experiencing episodes of costochondritis in 5/2016 and this episodes started on 8/12/2017 (3 days ago). Patient reports using a heat pad and naproxen with no relief of her pain. Patient reports using 3, 20 mg tablets of naproxen when she has a flare up of pain. She notes increased pain with breathing and movement which is causing shortness of breath. She also notes some mild increase in pain with palpation. She also complains of some numbness and aching in her left arm when she has a flare up of pain in her ribs. Patient reports having nausea and vomiting recently with her last episode of vomiting on 8/12/2017. She denies currently having any nausea. Patient denies any recent hospitalizations or surgeries, long trips, recent long drives or any smoking. She denies any blood clotting disorders. She has a family history of blood clots.    ALso to note for the past few months she has had severe nausea and vomiting where she is soon going to get EGD and colonoscopy.  It has made her more sedentary then usual     In the ED   Vitals:BP: 117/77 Pulse: 91 Temp: 98.7 Resp: 18 SP02:99 % Labs:  Sodium 138, Potassium 4.2, Creatinine 0.62, GFR >90, LFTS stable. HCG negative, WBC 6.4, Hgb 12.6, Plt 187, Troponin negative, D dimer 3.2  Medications:Received Lovenox in the ED. Patient's pain was managed in the ED with Toradol, Morphine, and Tramadol.    Imaging:Extensive bilateral pulmonary emboli. No evidence of right heart Strain. Bilateral lower lobe peripheral infarcts. Sub-4 mm nodular nodules along the left major fissure may represent fissural lymph nodes and do not require any additional routine follow-up in a low risk patient per  Fleischner criteria guidelines  Echo completed Global and regional left ventricular function is normal with an EF of 55-60%.The right ventricle is normal size. Global right ventricular function is Normal. No significant valvular abnormalities were noted. Patient was admitted in obs unit, and gyn was consulted.  Started patient on progesterone only OCP and she will FU for IUD placement.      Past Medical History    Anxiety/MDD  IBS    Past Surgical History   Right shoulder surgery 2012    Prior to Admission Medications   Prior to Admission Medications   Prescriptions Last Dose Informant Patient Reported? Taking?   DICYCLOMINE HCL PO 8/14/2017 at 700 Self Yes Yes   Sig: Take 10-20 mg by mouth 2 times daily as needed    Escitalopram Oxalate (LEXAPRO PO) 8/14/2017 at 700 Self Yes Yes   Sig: Take 10 mg by mouth daily    LORazepam (ATIVAN PO) 8/14/2017 at prn Self Yes Yes   Sig: Take 1 mg by mouth 3 times daily as needed    Multiple Vitamins-Minerals (WOMENS MULTIVITAMIN PLUS PO) 8/14/2017 at 700  Yes Yes   Sig: Take 2 tablets by mouth daily   NAPROXEN PO 8/15/2017 at 700 Self Yes Yes   Sig: Take 250 mg by mouth 3 times daily as needed for moderate pain   OMEPRAZOLE PO 8/14/2017 at 700 Self Yes Yes   Sig: Take 40 mg by mouth every morning   Ondansetron (ZOFRAN ODT PO) Past Week at prn Self Yes Yes   Sig: Take 4 mg by mouth every 8 hours as needed for nausea   albuterol (PROAIR HFA/PROVENTIL HFA/VENTOLIN HFA) 108 (90 BASE) MCG/ACT Inhaler Past Month at prn Self Yes Yes   Sig: Inhale 2 puffs into the lungs every 4 hours as needed for shortness of breath / dyspnea or wheezing   levonorgestrel-ethinyl estradiol (AVIANE,ALESSE,LESSINA) 0.1-20 MG-MCG per tablet 8/14/2017 at 700 Self Yes Yes   Sig: Take 1 tablet by mouth daily      Facility-Administered Medications: None     Allergies   No Known Allergies    Social History   Single  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     Review of Systems    FULL 10pt ROS completed refer to hpi and below  General: Denies fevers, night sweats or chills.   HEENT:nose and gum bleeding  Lymph: Denies any new lumps or bumps.    GI: Denies blood in stool, black tarry appearance to stool.  : Denies pain blood in urine or urine the color of coca-cola.  Skin: Denies rashes or other skin lesions or changes in skin color.  Heme: Denies bruising.    Physical Exam   Temp: 98.1  F (36.7  C) Temp src: Oral BP: 101/67 Pulse: 91 Heart Rate: 55 Resp: 16 SpO2: 100 % O2 Device: Nasal cannula Oxygen Delivery: 1 LPM  Vital Signs with Ranges  Temp:  [97.6  F (36.4  C)-98.7  F (37.1  C)] 98.1  F (36.7  C)  Pulse:  [91] 91  Heart Rate:  [49-87] 55  Resp:  [13-24] 16  BP: (101-127)/(67-97) 101/67  SpO2:  [88 %-100 %] 100 %  119 lbs 0 oz  GEN: comfortable, in no distress  HEENT: atraumatic, sclera anicteric, no oral thursh  Neck: No LAD, no JVD  Chest: no tenderness to palpation  CV: RRR, S1/2 present no mrg, no LE peripheral edema  LUNG: comfortable on room air, CTAB, no wheezing rales or rhonchi  Abdomen: no distension, no tenderness to palpation, no hepatomegaly  MSK: no gross deformities  SKIN: warm, dry, no rash  NEURO: no gross abnormalities, alert and oriented x3  PSYCH: appropriate affect and mood.  Lymph: no LAD at neck, axilla and groin    Data   -Data reviewed today: All pertinent laboratory and imaging results from this encounter were reviewed. I personally reviewed CT Chest showing PE.    Recent Labs  Lab 08/16/17  0743 08/15/17  2205 " 08/15/17  1932 08/15/17  1339 08/15/17  1040   WBC 6.2  --  8.2  --  6.4   HGB 11.7  --  11.8  --  12.6   MCV 90  --  90  --  89     --  185  --  187   INR  --  1.14  --   --  Quantity not sufficient     --   --   --  138   POTASSIUM 4.6  --   --   --  4.2   CHLORIDE 109  --   --   --  106   CO2 24  --   --   --  27   BUN 3*  --   --   --  6*   CR 0.65  --   --   --  0.62   ANIONGAP 5  --   --   --  6   STALIN 8.2*  --   --   --  8.4*   GLC 83  --   --   --  94   ALBUMIN  --   --   --   --  3.4   PROTTOTAL  --   --   --   --  7.0   BILITOTAL  --   --   --   --  0.2   ALKPHOS  --   --   --   --  58   ALT  --   --   --   --  28   AST  --   --   --   --  26   TROPI  --   --   --  <0.015  --        Recent Results (from the past 24 hour(s))   XR Chest 2 Views    Narrative    XR CHEST 2 VW  8/15/2017 11:42 AM      HISTORY: left sided chest pain    COMPARISON: 12/7/2010    FINDINGS: PA and lateral views of the chest upright. The  cardiomediastinal silhouette and pulmonary vasculature are stable and  within normal limits. There is no focal airspace opacity, pleural  effusion, or pneumothorax. The visualized upper abdomen, osseous  structures, and soft tissues are unremarkable.      Impression    IMPRESSION: Clear lungs.     I have personally reviewed the examination and initial interpretation  and I agree with the findings.    FERMIN HOBBS MD   CT Chest Pulmonary Embolism w Contrast   Result Value    Radiologist flags Bilateral pulmonary emboli (AA)    Addendum: 8/15/2017    Addendum:    Three-dimensional (3D) post-processed angiographic images were  reconstructed, archived to PACS and used in interpretation of this  study.    ANNETTE HAWKINS MD      Narrative    Exam: Chest CT Pulmonary Angiogram 8/15/2017 12:57 PM..    History: Left lateral chest pain.    Comparison: None.    Technique: Volumetric helical acquisition of the chest was obtained  following pulmonary embolism protocol.     Findings:    There is  adequate opacification of the pulmonary arteries. There are  bilateral central pulmonary artery embolisms including the left lower  lobe lobar pulmonary artery, which is near occlusive, extending into  lower lobe segmental branches, and extending partially into the left  upper lobe pulmonary artery. There is additionally a right pulmonary  artery lobar branch embolism extending into the superior segmental  branches and lower lobe segmental branches. There is somewhat  increased attenuation of the anterior upper mediastinal fat, which is  likely non-opacified innominate vessels, or possibly thymic tissue  anteriorly.    No evidence of right heart strain. No significant mediastinal  lymphadenopathy.    Bilateral wedge-shaped peripheral lower lobe opacities. Bibasilar  subsegmental atelectasis. Sub-4 mm nodular opacities along the left  major fissure (series 7, image 138).    Limited visualized upper abdomen. Splenule.    No suspicious bony lesions.       Impression    Impression:   1. Extensive bilateral pulmonary emboli. No evidence of right heart  strain.  2. Bilateral lower lobe peripheral infarcts.  3. Sub-4 mm nodular nodules along the left major fissure may represent  fissural lymph nodes and do not require any additional routine  follow-up in a low risk patient per Fleischner criteria guidelines.    [Critical Result: Bilateral pulmonary emboli]    Finding was identified on 8/15/2017 12:51 PM.     Dr. Gutiérrez was contacted by Dr. Zamudio at 8/15/2017 12:52 PM and  verbalized understanding of the critical finding.     I have personally reviewed the examination and initial interpretation  and I agree with the findings.    ANNETTE HAWKINS MD       ECHO 8/15/17  Interpretation Summary  Global and regional left ventricular function is normal with an EF of 55-60%.  The right ventricle is normal size. Global right ventricular function is  normal.  No significant valvular abnormalities were noted.  Right ventricular  systolic pressure is 18mmHg above the right atrial pressure  (Pulmonary artery pressure is normal)  The inferior vena cava was normal in size with preserved respiratory  variability.  No pericardial effusion is present.  Previous study not available for comparison.  _____________________________________________________________________________

## 2017-08-16 NOTE — PLAN OF CARE
Problem: Discharge Planning  Goal: Discharge Planning (Adult, OB, Behavioral, Peds)  - Stable Cardiopulmonary status: YES  - Vital signs stable: YES  - Anticoagulation initiated and able to administer upon discharge: YES  - Follow up for bleeding and clotting disorders set up: NO     Waiting on hematology consult, patient is a/o x 4, up independently.  Will continue to monitor.

## 2017-08-16 NOTE — PLAN OF CARE
Problem: Discharge Planning  Goal: Discharge Planning (Adult, OB, Behavioral, Peds)  Observation goals PRIOR TO DISCHARGE     Comments: - Stable Cardiopulmonary status: yes  - Vital signs stable : yes  - Anticoagulation initiated and able to administer upon discharge: will switch to xerelto in the am  - Follow up for bleeding and clotting disorders set up: no       Hematology  consults pending

## 2017-08-16 NOTE — PROGRESS NOTES
"Emergency Medicine Observation Attending note    The patient was independently seen and examined by me. The chart, vital signs, and labs were reviewed. The patient's findings were discussed with the ELIE on the observation unit, and I agree with the findings of the note and the plan.    20 yo female, otherwise healthy, admitted to the observation unit after presenting to the ER with c/o pleruitic CP, and ultimately dx with PEs. She reproted L sided CP radiating through to the back, which is pleuritic, and has some assoc dyspnea. She did have some N/V several days ago, though this has now resolved. Her d-dimer was quite high, and chest CT revealed extensive bilateral PEs, with bilateral lower lobe peripheral infarcts. She does take oral contraceptives. Per the chart, she originally denies family h/o clotting, then subsequently confirmed several family members with clotting history. EKG showed non-specific T wave changes, and echo did not show evidence of right heart strain. She was given a dose of lovenox, with plan to start on NOAC (Xarelto). Her sats had been nl (mostly high 90s), though, per the chart, she dropped overnight and was placed on 1L supplemental O2. This am she reports ongoing pain, though no dyspnea.    /67  Pulse 91  Temp 98.1  F (36.7  C) (Oral)  Resp 16  Ht 1.575 m (5' 2\")  Wt 54 kg (119 lb)  SpO2 100%  BMI 21.77 kg/m2  Exam:  General: awake, alert, NAD  HEENT: NC/AT, oropharynx moist and clear  Neck: supple  Lungs: CTA-B  Heart: RRR, no M/R/G  Abd: soft, ND/NT  Ext: non-tender, no edema        Assessment/plan:  1. PE - fam hx + on OCPs. Per gynecology - no further estrogen use - could use progesterone only contraceptive, possibly IUD. Given extensive nature of PEs as well as pulm infarcts and reported fam hx, will consult hematology.  "

## 2017-08-16 NOTE — PLAN OF CARE
Problem: Discharge Planning  Goal: Discharge Planning (Adult, OB, Behavioral, Peds)  - Stable Cardiopulmonary status: YES  - Vital signs stable: YES  - Anticoagulation initiated and able to administer upon discharge: YES  - Follow up for bleeding and clotting disorders set up: NO

## 2017-08-16 NOTE — DISCHARGE INSTRUCTIONS
[facility] #[phone]    Call [facility] Specialist to get IUD placed: #[phone]

## 2017-08-16 NOTE — CONSULTS
"[facility] History and Physical    María Ortiz MRN# [id number]   Age: 21 year old YOB: 1995     Date of Admission:  8/15/2017    Primary care provider: Lindsay Ley            Reason for consult   Contraceptive recommendations         History of Present Illness:   This patient is a 21 year old G0 with hx of dysmenorrhea/AUB who presented with chest pain found to have bilateral PEs, now admitted acute management with anticoagulation and pain control.   She has been on oral contraceptives for the past 10 years due to severe symptoms of nausea/vomiting, abdominal cramping, and heavy bleeding around the time of her periods. OCPs have helped these symptoms improve though she still has them when she takes the placebo pills and has a withdrawal bleed. Typically would use about 5 tampons a day for 2-3 days during her \"heavy bleeding\". Notes some passage of clots.  Because of this she has been cycling them [id number] for the past few years. In the last 6 months or so she decided to take them continuously to avoid the symptoms associated with her withdrawal bleeds. She has had breakthrough bleeding that she says is like a period and these happen every month or 2 for a few days but the symptoms are tolerable as long as she stays on her pill. Last bleeding was around the beginning of June. Reports occasionally will get malodorous milky discharge from vagina. She uses multiple OTC products to try to prevent yeast.      Of note, the patient herself has no history of blood clots, though she mentions she has a strong family history - her father has 2 filters in place for blood clots (\"one in neck and one in leg\"), her paternal grandmother and 2 other relatives on that side of the family are also on anticoagulation for blood clots. There has never been any type of genetic work up that she is aware of. She states she does not smoke. She has migraines occasionally but they are not associated with aura. " "    Receives Ob/gyn care from [facility].     Gyn Hx:  Menses: as above.  Sexual hx: Not sexually active. Has never had vaginal intercourse.   STIs: denies. Has had yeast infections in the past. Will take \"azole pills\" regularly to prevent these.   Pap: done last year, normal per patient though not in records.    Ob Hx:   G0            Past Medical History:     Past Medical History:   Diagnosis Date     Anxiety      Depressive disorder      IBS (irritable bowel syndrome)           Past Surgical History:      Past Surgical History:   Procedure Laterality Date     ORTHOPEDIC SURGERY      R shoulder 2012          Social History:     Social History   Substance Use Topics     Smoking status: Never Smoker     Smokeless tobacco: Not on file     Alcohol use Not on file          Family History:     - See HPI         Immunizations:     There is no immunization history on file for this patient.       Allergies:   No Known Allergies       Medications:     Current Facility-Administered Medications   Medication     0.9% sodium chloride infusion     sodium chloride 0.9 % for CT scan flush dose 83 mL     naloxone (NARCAN) injection 0.1-0.4 mg     ketorolac (TORADOL) injection 30 mg     [START ON 8/16/2017] rivaroxaban ANTICOAGULANT (XARELTO) tablet 15 mg     lidocaine 1 % 1 mL     lidocaine (LMX4) kit     sodium chloride (PF) 0.9% PF flush 3 mL     sodium chloride (PF) 0.9% PF flush 3 mL     escitalopram (LEXAPRO) tablet 10 mg     LORazepam (ATIVAN) tablet 1 mg     omeprazole (priLOSEC) CR capsule 40 mg     HYDROmorphone (PF) (DILAUDID) injection 0.3-0.5 mg     oxyCODONE (ROXICODONE) IR tablet 5-10 mg     diphenhydrAMINE (BENADRYL) capsule 25 mg     ondansetron (ZOFRAN-ODT) ODT tab 4 mg    Or     ondansetron (ZOFRAN) injection 4 mg     prochlorperazine (COMPAZINE) injection 5-10 mg    Or     prochlorperazine (COMPAZINE) tablet 5-10 mg    Or     prochlorperazine (COMPAZINE) Suppository 25 mg          Review of " Systems:   Complete 10 point ROS negative except as noted in HPI.        Physical Exam:     Vitals:    08/15/17 1558 08/15/17 1700 08/15/17 2128 08/15/17 2226   BP: (!) 127/97  116/84 108/80   Pulse:       Resp: 20 24 16 16   Temp: 98  F (36.7  C)  97.6  F (36.4  C) 97.6  F (36.4  C)   TempSrc: Oral  Oral Oral   SpO2: 99%  100% 100%   Weight:       Height:         General: alert, cooperative, NAD  CV: Regular rate and rhythm   Resp: clear to auscultation bilaterally. Some pain with deep inspiration.   Abdomen: soft, non tender, non distended. No masses, organomegaly.  : NEFG. On speculum exam vagina and cervix were normal. Small amount of whitish/yellow mucus discharge noted around cervix. Wet mount collected. On bimanual, uterus small and mobile, no CMT, no adnexal tenderness or fullness. Bladder full.   Extremities: Warm, well perfused. No rashes, lesions or bilateral lower extremity edema.          Data:     Results for orders placed or performed during the hospital encounter of 08/15/17 (from the past 24 hour(s))   EKG 12-lead, tracing only   Result Value Ref Range    Interpretation ECG Click View Image link to view waveform and result    CBC with platelets differential   Result Value Ref Range    WBC 6.4 4.0 - 11.0 10e9/L    RBC Count 4.06 3.8 - 5.2 10e12/L    Hemoglobin 12.6 11.7 - 15.7 g/dL    Hematocrit 36.2 35.0 - 47.0 %    MCV 89 78 - 100 fl    MCH 31.0 26.5 - 33.0 pg    MCHC 34.8 31.5 - 36.5 g/dL    RDW 12.3 10.0 - 15.0 %    Platelet Count 187 150 - 450 10e9/L    Diff Method Automated Method     % Neutrophils 67.1 %    % Lymphocytes 19.9 %    % Monocytes 6.1 %    % Eosinophils 6.4 %    % Basophils 0.3 %    % Immature Granulocytes 0.2 %    Nucleated RBCs 0 0 /100    Absolute Neutrophil 4.3 1.6 - 8.3 10e9/L    Absolute Lymphocytes 1.3 0.8 - 5.3 10e9/L    Absolute Monocytes 0.4 0.0 - 1.3 10e9/L    Absolute Eosinophils 0.4 0.0 - 0.7 10e9/L    Absolute Basophils 0.0 0.0 - 0.2 10e9/L    Abs Immature  Granulocytes 0.0 0 - 0.4 10e9/L    Absolute Nucleated RBC 0.0    D dimer quantitative   Result Value Ref Range    D Dimer 3.2 (H) 0.0 - 0.50 ug/ml FEU   Basic metabolic panel   Result Value Ref Range    Sodium 138 133 - 144 mmol/L    Potassium 4.2 3.4 - 5.3 mmol/L    Chloride 106 94 - 109 mmol/L    Carbon Dioxide 27 20 - 32 mmol/L    Anion Gap 6 3 - 14 mmol/L    Glucose 94 70 - 99 mg/dL    Urea Nitrogen 6 (L) 7 - 30 mg/dL    Creatinine 0.62 0.52 - 1.04 mg/dL    GFR Estimate >90 >60 mL/min/1.7m2    GFR Estimate If Black >90 >60 mL/min/1.7m2    Calcium 8.4 (L) 8.5 - 10.1 mg/dL   Hepatic panel   Result Value Ref Range    Bilirubin Direct <0.1 0.0 - 0.2 mg/dL    Bilirubin Total 0.2 0.2 - 1.3 mg/dL    Albumin 3.4 3.4 - 5.0 g/dL    Protein Total 7.0 6.8 - 8.8 g/dL    Alkaline Phosphatase 58 40 - 150 U/L    ALT 28 0 - 50 U/L    AST 26 0 - 45 U/L   INR   Result Value Ref Range    INR Quantity not sufficient 0.86 - 1.14   Partial thromboplastin time   Result Value Ref Range    PTT Quantity not sufficient 22 - 37 sec   hCG qual urine POCT   Result Value Ref Range    HCG Qual Urine Negative neg    Internal QC OK Yes    XR Chest 2 Views    Narrative    XR CHEST 2 VW  8/15/2017 11:42 AM      HISTORY: left sided chest pain    COMPARISON: 12/7/2010    FINDINGS: PA and lateral views of the chest upright. The  cardiomediastinal silhouette and pulmonary vasculature are stable and  within normal limits. There is no focal airspace opacity, pleural  effusion, or pneumothorax. The visualized upper abdomen, osseous  structures, and soft tissues are unremarkable.      Impression    IMPRESSION: Clear lungs.     I have personally reviewed the examination and initial interpretation  and I agree with the findings.    FERMIN HOBBS MD   CT Chest Pulmonary Embolism w Contrast   Result Value Ref Range    Radiologist flags Bilateral pulmonary emboli (AA)     Addendum: 8/15/2017    Addendum:    Three-dimensional (3D) post-processed  angiographic images were  reconstructed, archived to PACS and used in interpretation of this  study.    ANNETTE HAWKINS MD      Narrative    Exam: Chest CT Pulmonary Angiogram 8/15/2017 12:57 PM..    History: Left lateral chest pain.    Comparison: None.    Technique: Volumetric helical acquisition of the chest was obtained  following pulmonary embolism protocol.     Findings:    There is adequate opacification of the pulmonary arteries. There are  bilateral central pulmonary artery embolisms including the left lower  lobe lobar pulmonary artery, which is near occlusive, extending into  lower lobe segmental branches, and extending partially into the left  upper lobe pulmonary artery. There is additionally a right pulmonary  artery lobar branch embolism extending into the superior segmental  branches and lower lobe segmental branches. There is somewhat  increased attenuation of the anterior upper mediastinal fat, which is  likely non-opacified innominate vessels, or possibly thymic tissue  anteriorly.    No evidence of right heart strain. No significant mediastinal  lymphadenopathy.    Bilateral wedge-shaped peripheral lower lobe opacities. Bibasilar  subsegmental atelectasis. Sub-4 mm nodular opacities along the left  major fissure (series 7, image 138).    Limited visualized upper abdomen. Splenule.    No suspicious bony lesions.       Impression    Impression:   1. Extensive bilateral pulmonary emboli. No evidence of right heart  strain.  2. Bilateral lower lobe peripheral infarcts.  3. Sub-4 mm nodular nodules along the left major fissure may represent  fissural lymph nodes and do not require any additional routine  follow-up in a low risk patient per Fleischner criteria guidelines.    [Critical Result: Bilateral pulmonary emboli]    Finding was identified on 8/15/2017 12:51 PM.     Dr. Gutiérrez was contacted by Dr. Zamudio at 8/15/2017 12:52 PM and  verbalized understanding of the critical finding.     I have  personally reviewed the examination and initial interpretation  and I agree with the findings.    ANNETTE HAWKINS MD   Troponin I   Result Value Ref Range    Troponin I ES <0.015 0.000 - 0.045 ug/L   ECHO COMPLETE WITH OPTISON    Narrative    [id number]  [id number]  [id number]  [id number]^FIDEL^EDDIE^TRUE           [facility],[place]  Echocardiography Laboratory  [address] [place] [address]     Name: MORENA PALMER  MRN: [id number]  : 1995  Study Date: 08/15/2017 02:33 PM  Age: 21 yrs  Gender: Female  Patient Location: [facility]  Reason For Study: Pulmonary Embolism  Ordering Physician: EDDIE PARRA  Performed By: Andrea Mares RDCS     BSA: 1.5 m2  Height: 62 in  Weight: 119 lb  BP: 119/87 mmHg  _____________________________________________________________________________  __        Procedure  Complete Portable Echo Adult. Contrast Optison. Optison (NDC #0810-7608-13)  given intravenously. Patient was given 3 ml mixture of 3 ml Optison and 6 ml  saline. 6 ml wasted.  _____________________________________________________________________________  __        Interpretation Summary  Global and regional left ventricular function is normal with an EF of 55-60%.  The right ventricle is normal size. Global right ventricular function is  normal.  No significant valvular abnormalities were noted.  Right ventricular systolic pressure is 18mmHg above the right atrial pressure  (Pulmonary artery pressure is normal)  The inferior vena cava was normal in size with preserved respiratory  variability.  No pericardial effusion is present.  Previous study not available for comparison.  _____________________________________________________________________________  __        Left Ventricle  Left ventricular size is normal. Left ventricular wall thickness is normal.  Global and regional left ventricular function is normal with an EF of 55-60%.  Normal left ventricular filling for age.     Right  Ventricle  The right ventricle is normal size. Global right ventricular function is  normal.     Atria  Both atria appear normal.        Mitral Valve  The mitral valve is normal. Trace mitral insufficiency is present.     Aortic Valve  Aortic valve is normal in structure and function. The aortic valve is  tricuspid.     Tricuspid Valve  The tricuspid valve is normal. Trace tricuspid insufficiency is present. Right  ventricular systolic pressure is 18mmHg above the right atrial pressure.     Pulmonic Valve  The pulmonic valve is normal.     Vessels  The inferior vena cava was normal in size with preserved respiratory  variability. The aorta root is normal. Estimated mean right atrial pressure is  3 mmHg.     Pericardium  No pericardial effusion is present.        Compared to Previous Study  Previous study not available for comparison.  _____________________________________________________________________________  __     MMode/2D Measurements & Calculations  IVSd: 0.67 cm  LVIDd: 4.6 cm  LVIDs: 3.2 cm  LVPWd: 0.55 cm  FS: 30.4 %  EDV(Teich): 97.3 ml  ESV(Teich): 41.0 ml  LV mass(C)d: 83.7 grams  LV mass(C)dI: 54.6 grams/m2  Ao root diam: 2.9 cm  LVOT diam: 1.8 cm  LVOT area: 2.5 cm2        Doppler Measurements & Calculations  MV E max nichole: 84.1 cm/sec  MV A max nichole: 54.0 cm/sec  MV E/A: 1.6  MV dec time: 0.16 sec  PA acc time: 0.20 sec  TR max nichole: 209.0 cm/sec  TR max P.5 mmHg  Lateral E/e': 6.3  Medial E/e': 10.1              _____________________________________________________________________________  __        Report approved by: Robert Li 08/15/2017 04:17 PM      CBC with platelets differential   Result Value Ref Range    WBC 8.2 4.0 - 11.0 10e9/L    RBC Count 3.83 3.8 - 5.2 10e12/L    Hemoglobin 11.8 11.7 - 15.7 g/dL    Hematocrit 34.5 (L) 35.0 - 47.0 %    MCV 90 78 - 100 fl    MCH 30.8 26.5 - 33.0 pg    MCHC 34.2 31.5 - 36.5 g/dL    RDW 12.3 10.0 - 15.0 %    Platelet Count 185 150 - 450 10e9/L     Diff Method Automated Method     % Neutrophils 56.5 %    % Lymphocytes 29.3 %    % Monocytes 7.9 %    % Eosinophils 6.0 %    % Basophils 0.2 %    % Immature Granulocytes 0.1 %    Nucleated RBCs 0 0 /100    Absolute Neutrophil 4.6 1.6 - 8.3 10e9/L    Absolute Lymphocytes 2.4 0.8 - 5.3 10e9/L    Absolute Monocytes 0.7 0.0 - 1.3 10e9/L    Absolute Eosinophils 0.5 0.0 - 0.7 10e9/L    Absolute Basophils 0.0 0.0 - 0.2 10e9/L    Abs Immature Granulocytes 0.0 0 - 0.4 10e9/L    Absolute Nucleated RBC 0.0    Reticulocyte count   Result Value Ref Range    % Retic 1.0 0.5 - 2.0 %    Absolute Retic 37.2 25 - 95 10e9/L   INR   Result Value Ref Range    INR 1.14 0.86 - 1.14   Partial thromboplastin time   Result Value Ref Range    PTT 40 (H) 22 - 37 sec             Assessment and Plan:   Assessment: 21 year old with hx of dysmenorrhea and heavy bleeding menstrual bleeding controlled by use of continuous estrogen containing oral contraceptive pills who presents with bilateral pulmonary emboli, which is being managed by hematology. This thromboembolic event precludes use of estrogen containing contraceptives in the future. Though the patient is not currently sexually active, it is important she does not become pregnant until this clot is treated as this would cause a more hypercoagulable state. It would also be beneficial for the patient to be on a type of contraception that would improve the amount of bleeding and symptoms during her periods, thus we would recommend some type of progesterone only form of contraception.     Discussed pills, depo, nexplanon, and levonorgestrel IUD and the patient is undecided, but leaning toward an IUD.  This can be placed either inpatient before discharge or she may see her primary Ob/Gyn provider after discharge to schedule an appointment for IUD placement if she prefers.    She will likely have a withdrawal bleed in the next day or two as OCPs are being held. Given history of relatively heavy  menstrual bleeding and current anticoagulation, the patient could potentially have a heavy withdrawal bleed. Her hemoglobin is normal currently. Please notify us if the patient has ongoing heavy bleeding (ie fully saturates more than 2 pads in 2 hours) in case an acute intervention is needed to decrease her bleeding.     Recommendations:  - progesterone only contraception, likely IUD. Either inpatient or outpatient is fine (we will follow up with patient to see what she prefers).   - Agree with full hematologic workup, especially in the setting of her strong family history   - please notify Gyn team if patient begins to have worrisome bleeding of >2 pads/hour for >2 hours. Bleeding in the next few days is to be expected and would not be cause for concern given recent cessation of OCPs.   - can manage menstrual symptoms with scheduled NSAIDs and prn anti-emetics, which the patient already has ordered.   - will follow up wet mount that was collected for abnormal discharge and treat as needed.      Thank you for the opportunity to participate in this patient's care.     Discussed with staff Dr. Yost.  Celia Castanon MD  OB/GYN Resident PGY-2  Gyn Onc Team Pager [phone] (Daytime GYN pager [phone]).   8/15/2017 7:29 PM       Appreciate Dr. Castanon's note above, patient's history and physical exam findings reviewed by me. I agree with the recommendations above.   Claribel Yost MD

## 2017-08-16 NOTE — DISCHARGE SUMMARY
Discharge Summary    María Ortiz MRN# [id number]   YOB: 1995 Age: 21 year old     Date of Admission:  8/15/2017  Date of Discharge:  8/16/2017  Admitting Physician:  Kadi Gutiérrez MD  Discharge Physician:  Fide Waterman MD   Discharging Service:  Emergency Medicine     Primary Provider: Lindsay Ley          Discharge Diagnosis:     Bilateral pulmonary embolism (H)    * No resolved hospital problems. *               Discharge Disposition:   Discharged to home           Condition on Discharge:   Discharge condition: Stable   Code status on discharge: Full Code           Procedures:   No procedures performed during this admission          Discharge Medications:     Current Discharge Medication List      START taking these medications    Details   norethindrone (MICRONOR) 0.35 MG per tablet Take 1 tablet (0.35 mg) by mouth daily  Qty: 28 tablet, Refills: 3    Associated Diagnoses: Encounter for initial prescription of contraceptive pills      oxyCODONE (ROXICODONE) 5 MG IR tablet Take 1-2 tablets (5-10 mg) by mouth every 4 hours as needed for moderate to severe pain  Qty: 30 tablet, Refills: 0    Associated Diagnoses: Other pulmonary embolism without acute cor pulmonale, unspecified chronicity (H)      !! rivaroxaban ANTICOAGULANT (XARELTO) 15 MG TABS tablet Take 1 tablet (15 mg) by mouth 2 times daily (with meals) for 21 days  Qty: 42 tablet, Refills: 0    Associated Diagnoses: Other pulmonary embolism without acute cor pulmonale, unspecified chronicity (H)      !! rivaroxaban ANTICOAGULANT (XARELTO) 20 MG TABS tablet Take 1 tablet (20 mg) by mouth daily (with dinner)  Qty: 30 tablet, Refills: 6    Associated Diagnoses: Other pulmonary embolism without acute cor pulmonale, unspecified chronicity (H)       !! - Potential duplicate medications found. Please discuss with provider.      CONTINUE these medications which have NOT CHANGED    Details   LORazepam (ATIVAN PO) Take 1 mg by  mouth 3 times daily as needed       Escitalopram Oxalate (LEXAPRO PO) Take 10 mg by mouth daily       OMEPRAZOLE PO Take 40 mg by mouth every morning      DICYCLOMINE HCL PO Take 10-20 mg by mouth 2 times daily as needed       Ondansetron (ZOFRAN ODT PO) Take 4 mg by mouth every 8 hours as needed for nausea      albuterol (PROAIR HFA/PROVENTIL HFA/VENTOLIN HFA) 108 (90 BASE) MCG/ACT Inhaler Inhale 2 puffs into the lungs every 4 hours as needed for shortness of breath / dyspnea or wheezing      NAPROXEN PO Take 250 mg by mouth 3 times daily as needed for moderate pain      Multiple Vitamins-Minerals (WOMENS MULTIVITAMIN PLUS PO) Take 2 tablets by mouth daily         STOP taking these medications       levonorgestrel-ethinyl estradiol (AVIANE,ALESSE,LESSINA) 0.1-20 MG-MCG per tablet Comments:   Reason for Stopping:                     Consultations:   Consultation during this admission received from hematology             Brief History of Illness:   Please see detailed H&P from 8/15/17, in brief: María Ortiz is a 21 year old female with a history of depression, anxiety, costochondritis, IBS and acid reflux who presents to the Emergency Department for evaluation of left rib pain that radiates around her ribs into the left side of her back.           Hospital Course:   1. Extensive bilateral pulmonary emboli. - Seen on CT chest pulmonary. No evidence of right heart strain. Reported Left lateral chest pain. Not hypoxic. EKG normal. D dimer 3.2. HCG negative. Echo completed Global and regional left ventricular function is normal with an EF of 55-60%.The right ventricle is normal size. Global right ventricular function is Normal. No significant valvular abnormalities were noted.  /77 HR 78 Temp 98.7 SPO2 99. Received Lovenox in the ED. Patient's pain was managed in the ED with Toradol, Morphine, and Tramadol. Xarelto started in the obs unit, hematology consulted, recommended continuing Xarelto for 6 months, follow  "up at hematology clinic in 6 months to discuss further management.   - Start Xarelto 15 mg BID x 21 days then 20 mg daily with food.  - Pain management w/Oxycodone, tylenol prn     2. Dysmenorrhea - Reprts dehydration, chills, vomiting and severe abdominal cramping  when she gets her menstrual cycle when she isn't on birth control. She takes Levonorgestrel-ethinyl estradiol. Gynecology consulted and recommended estrogen only birth control until she can get the IUD placed.  She was discharged on micronor.      3. IBS  - On bentyl     4. GERD scheduled for an upper GI scope in September  Currently takes Zofran, Prilosec, and dicyclomine     5. Depression/Anxiety - Continue on home Lexapro and Ativan prn.                Final Day of Progress before Discharge:       Physical Exam:  Blood pressure 117/90, pulse 91, temperature 98.7  F (37.1  C), temperature source Oral, resp. rate 16, height 1.575 m (5' 2\"), weight 54 kg (119 lb), SpO2 100 %.    EXAM:  Exam:  Constitutional: healthy, alert and no distress  Head: Normocephalic. No masses, lesions, tenderness or abnormalities  Cardiovascular: No lifts, heaves, or thrills. RRR. No murmurs, clicks gallops or rub  Respiratory: Good diaphragmatic excursion. Lungs clear  Gastrointestinal: Abdomen soft, non-tender. BS normal. No masses, organomegaly  Musculoskeletal: extremities normal- no gross deformities noted, gait normal and normal muscle tone  Skin: no suspicious lesions or rashes  Neurologic: Gait normal. Alert and oriented.   Psychiatric: mentation appears normal and affect normal/bright    /90  Pulse 91  Temp 98.7  F (37.1  C) (Oral)  Resp 16  Ht 1.575 m (5' 2\")  Wt 54 kg (119 lb)  SpO2 100%  BMI 21.77 kg/m2             Data:  All laboratory data reviewed             Significant Results:     Results for orders placed or performed during the hospital encounter of 08/15/17   XR Chest 2 Views    Narrative    XR CHEST 2 VW  8/15/2017 11:42 AM      HISTORY: left " sided chest pain    COMPARISON: 12/7/2010    FINDINGS: PA and lateral views of the chest upright. The  cardiomediastinal silhouette and pulmonary vasculature are stable and  within normal limits. There is no focal airspace opacity, pleural  effusion, or pneumothorax. The visualized upper abdomen, osseous  structures, and soft tissues are unremarkable.      Impression    IMPRESSION: Clear lungs.     I have personally reviewed the examination and initial interpretation  and I agree with the findings.    FERMIN HOBBS MD   CT Chest Pulmonary Embolism w Contrast   Result Value Ref Range    Radiologist flags Bilateral pulmonary emboli (AA)     Addendum: 8/15/2017    Addendum:    Three-dimensional (3D) post-processed angiographic images were  reconstructed, archived to PACS and used in interpretation of this  study.    ANNETTE HAWKINS MD      Narrative    Exam: Chest CT Pulmonary Angiogram 8/15/2017 12:57 PM..    History: Left lateral chest pain.    Comparison: None.    Technique: Volumetric helical acquisition of the chest was obtained  following pulmonary embolism protocol.     Findings:    There is adequate opacification of the pulmonary arteries. There are  bilateral central pulmonary artery embolisms including the left lower  lobe lobar pulmonary artery, which is near occlusive, extending into  lower lobe segmental branches, and extending partially into the left  upper lobe pulmonary artery. There is additionally a right pulmonary  artery lobar branch embolism extending into the superior segmental  branches and lower lobe segmental branches. There is somewhat  increased attenuation of the anterior upper mediastinal fat, which is  likely non-opacified innominate vessels, or possibly thymic tissue  anteriorly.    No evidence of right heart strain. No significant mediastinal  lymphadenopathy.    Bilateral wedge-shaped peripheral lower lobe opacities. Bibasilar  subsegmental atelectasis. Sub-4 mm nodular opacities  along the left  major fissure (series 7, image 138).    Limited visualized upper abdomen. Splenule.    No suspicious bony lesions.       Impression    Impression:   1. Extensive bilateral pulmonary emboli. No evidence of right heart  strain.  2. Bilateral lower lobe peripheral infarcts.  3. Sub-4 mm nodular nodules along the left major fissure may represent  fissural lymph nodes and do not require any additional routine  follow-up in a low risk patient per Fleischner criteria guidelines.    [Critical Result: Bilateral pulmonary emboli]    Finding was identified on 8/15/2017 12:51 PM.     Dr. Gutiérrez was contacted by Dr. Zamudio at 8/15/2017 12:52 PM and  verbalized understanding of the critical finding.     I have personally reviewed the examination and initial interpretation  and I agree with the findings.    ANNETTE HAWKINS MD   CBC with platelets differential   Result Value Ref Range    WBC 6.4 4.0 - 11.0 10e9/L    RBC Count 4.06 3.8 - 5.2 10e12/L    Hemoglobin 12.6 11.7 - 15.7 g/dL    Hematocrit 36.2 35.0 - 47.0 %    MCV 89 78 - 100 fl    MCH 31.0 26.5 - 33.0 pg    MCHC 34.8 31.5 - 36.5 g/dL    RDW 12.3 10.0 - 15.0 %    Platelet Count 187 150 - 450 10e9/L    Diff Method Automated Method     % Neutrophils 67.1 %    % Lymphocytes 19.9 %    % Monocytes 6.1 %    % Eosinophils 6.4 %    % Basophils 0.3 %    % Immature Granulocytes 0.2 %    Nucleated RBCs 0 0 /100    Absolute Neutrophil 4.3 1.6 - 8.3 10e9/L    Absolute Lymphocytes 1.3 0.8 - 5.3 10e9/L    Absolute Monocytes 0.4 0.0 - 1.3 10e9/L    Absolute Eosinophils 0.4 0.0 - 0.7 10e9/L    Absolute Basophils 0.0 0.0 - 0.2 10e9/L    Abs Immature Granulocytes 0.0 0 - 0.4 10e9/L    Absolute Nucleated RBC 0.0    D dimer quantitative   Result Value Ref Range    D Dimer 3.2 (H) 0.0 - 0.50 ug/ml FEU   Basic metabolic panel   Result Value Ref Range    Sodium 138 133 - 144 mmol/L    Potassium 4.2 3.4 - 5.3 mmol/L    Chloride 106 94 - 109 mmol/L    Carbon Dioxide 27 20 - 32 mmol/L     Anion Gap 6 3 - 14 mmol/L    Glucose 94 70 - 99 mg/dL    Urea Nitrogen 6 (L) 7 - 30 mg/dL    Creatinine 0.62 0.52 - 1.04 mg/dL    GFR Estimate >90 >60 mL/min/1.7m2    GFR Estimate If Black >90 >60 mL/min/1.7m2    Calcium 8.4 (L) 8.5 - 10.1 mg/dL   Hepatic panel   Result Value Ref Range    Bilirubin Direct <0.1 0.0 - 0.2 mg/dL    Bilirubin Total 0.2 0.2 - 1.3 mg/dL    Albumin 3.4 3.4 - 5.0 g/dL    Protein Total 7.0 6.8 - 8.8 g/dL    Alkaline Phosphatase 58 40 - 150 U/L    ALT 28 0 - 50 U/L    AST 26 0 - 45 U/L   Troponin I   Result Value Ref Range    Troponin I ES <0.015 0.000 - 0.045 ug/L   Activated protein C resistance   Result Value Ref Range    Activated Prot C Resistance Ratio Canceled, Test credited >2.12   Antithrombin III   Result Value Ref Range    Antithrombin III Chromogenic Canceled, Test credited 85 - 135 %   CBC with platelets differential   Result Value Ref Range    WBC 8.2 4.0 - 11.0 10e9/L    RBC Count 3.83 3.8 - 5.2 10e12/L    Hemoglobin 11.8 11.7 - 15.7 g/dL    Hematocrit 34.5 (L) 35.0 - 47.0 %    MCV 90 78 - 100 fl    MCH 30.8 26.5 - 33.0 pg    MCHC 34.2 31.5 - 36.5 g/dL    RDW 12.3 10.0 - 15.0 %    Platelet Count 185 150 - 450 10e9/L    Diff Method Automated Method     % Neutrophils 56.5 %    % Lymphocytes 29.3 %    % Monocytes 7.9 %    % Eosinophils 6.0 %    % Basophils 0.2 %    % Immature Granulocytes 0.1 %    Nucleated RBCs 0 0 /100    Absolute Neutrophil 4.6 1.6 - 8.3 10e9/L    Absolute Lymphocytes 2.4 0.8 - 5.3 10e9/L    Absolute Monocytes 0.7 0.0 - 1.3 10e9/L    Absolute Eosinophils 0.5 0.0 - 0.7 10e9/L    Absolute Basophils 0.0 0.0 - 0.2 10e9/L    Abs Immature Granulocytes 0.0 0 - 0.4 10e9/L    Absolute Nucleated RBC 0.0    Reticulocyte count   Result Value Ref Range    % Retic 1.0 0.5 - 2.0 %    Absolute Retic 37.2 25 - 95 10e9/L   INR   Result Value Ref Range    INR Quantity not sufficient 0.86 - 1.14   Partial thromboplastin time   Result Value Ref Range    PTT Quantity not  sufficient 22 - 37 sec   INR   Result Value Ref Range    INR 1.14 0.86 - 1.14   Partial thromboplastin time   Result Value Ref Range    PTT 40 (H) 22 - 37 sec   CBC with platelets   Result Value Ref Range    WBC 6.2 4.0 - 11.0 10e9/L    RBC Count 3.80 3.8 - 5.2 10e12/L    Hemoglobin 11.7 11.7 - 15.7 g/dL    Hematocrit 34.3 (L) 35.0 - 47.0 %    MCV 90 78 - 100 fl    MCH 30.8 26.5 - 33.0 pg    MCHC 34.1 31.5 - 36.5 g/dL    RDW 12.5 10.0 - 15.0 %    Platelet Count 176 150 - 450 10e9/L   Basic metabolic panel   Result Value Ref Range    Sodium 138 133 - 144 mmol/L    Potassium 4.6 3.4 - 5.3 mmol/L    Chloride 109 94 - 109 mmol/L    Carbon Dioxide 24 20 - 32 mmol/L    Anion Gap 5 3 - 14 mmol/L    Glucose 83 70 - 99 mg/dL    Urea Nitrogen 3 (L) 7 - 30 mg/dL    Creatinine 0.65 0.52 - 1.04 mg/dL    GFR Estimate >90 >60 mL/min/1.7m2    GFR Estimate If Black >90 >60 mL/min/1.7m2    Calcium 8.2 (L) 8.5 - 10.1 mg/dL   EKG 12-lead, tracing only   Result Value Ref Range    Interpretation ECG Click View Image link to view waveform and result    Gynecologic Oncology Adult IP Consult: Patient to be seen: Routine within 24 hrs; Call back #: [phone]; Extensive BIlateral PEs.; Consultant may enter orders: Yes    Celia Barnett MD     8/15/2017 11:29 PM  [facility] History and Physical    María Ortiz MRN# [id number]   Age: 21 year old YOB: 1995     Date of Admission:  8/15/2017    Primary care provider: Lindsay Ley            Reason for consult   Contraceptive recommendations         History of Present Illness:   This patient is a 21 year old G0 with hx of dysmenorrhea/AUB who   presented with chest pain found to have bilateral PEs, now   admitted acute management with anticoagulation and pain control.     She has been on oral contraceptives for the past 10 years due to   severe symptoms of nausea/vomiting, abdominal cramping, and heavy   bleeding around the time of her periods. OCPs  "have helped these   symptoms improve though she still has them when she takes the   placebo pills and has a withdrawal bleed. Typically would use   about 5 tampons a day for 2-3 days during her \"heavy bleeding\".   Notes some passage of clots.  Because of this she has been   cycling them [id number] for the past few years. In the last 6   months or so she decided to take them continuously to avoid the   symptoms associated with her withdrawal bleeds. She has had   breakthrough bleeding that she says is like a period and these   happen every month or 2 for a few days but the symptoms are   tolerable as long as she stays on her pill. Last bleeding was   around the beginning of June. Reports occasionally will get   malodorous milky discharge from vagina. She uses multiple OTC   products to try to prevent yeast.      Of note, the patient herself has no history of blood clots,   though she mentions she has a strong family history - her father   has 2 filters in place for blood clots (\"one in neck and one in   leg\"), her paternal grandmother and 2 other relatives on that   side of the family are also on anticoagulation for blood clots.   There has never been any type of genetic work up that she is   aware of. She states she does not smoke. She has migraines   occasionally but they are not associated with aura.     Rece[facility] from [facility].     Gyn Hx:  Menses: as above.  Sexual hx: Not sexually active. Has never had vaginal   intercourse.   STIs: denies. Has had yeast infections in the past. Will take   \"azole pills\" regularly to prevent these.   Pap: done last year, normal per patient though not in records.    Ob Hx:   G0            Past Medical History:     Past Medical History:   Diagnosis Date     Anxiety      Depressive disorder      IBS (irritable bowel syndrome)           Past Surgical History:      Past Surgical History:   Procedure Laterality Date     ORTHOPEDIC SURGERY      R shoulder " 2012          Social History:     Social History   Substance Use Topics     Smoking status: Never Smoker     Smokeless tobacco: Not on file     Alcohol use Not on file          Family History:     - See HPI         Immunizations:     There is no immunization history on file for this patient.       Allergies:   No Known Allergies       Medications:     Current Facility-Administered Medications   Medication     0.9% sodium chloride infusion     sodium chloride 0.9 % for CT scan flush dose 83 mL     naloxone (NARCAN) injection 0.1-0.4 mg     ketorolac (TORADOL) injection 30 mg     [START ON 8/16/2017] rivaroxaban ANTICOAGULANT (XARELTO) tablet   15 mg     lidocaine 1 % 1 mL     lidocaine (LMX4) kit     sodium chloride (PF) 0.9% PF flush 3 mL     sodium chloride (PF) 0.9% PF flush 3 mL     escitalopram (LEXAPRO) tablet 10 mg     LORazepam (ATIVAN) tablet 1 mg     omeprazole (priLOSEC) CR capsule 40 mg     HYDROmorphone (PF) (DILAUDID) injection 0.3-0.5 mg     oxyCODONE (ROXICODONE) IR tablet 5-10 mg     diphenhydrAMINE (BENADRYL) capsule 25 mg     ondansetron (ZOFRAN-ODT) ODT tab 4 mg    Or     ondansetron (ZOFRAN) injection 4 mg     prochlorperazine (COMPAZINE) injection 5-10 mg    Or     prochlorperazine (COMPAZINE) tablet 5-10 mg    Or     prochlorperazine (COMPAZINE) Suppository 25 mg          Review of Systems:   Complete 10 point ROS negative except as noted in HPI.        Physical Exam:     Vitals:    08/15/17 1558 08/15/17 1700 08/15/17 2128 08/15/17 2226   BP: (!) 127/97  116/84 108/80   Pulse:       Resp: 20 24 16 16   Temp: 98  F (36.7  C)  97.6  F (36.4  C) 97.6  F (36.4  C)   TempSrc: Oral  Oral Oral   SpO2: 99%  100% 100%   Weight:       Height:         General: alert, cooperative, NAD  CV: Regular rate and rhythm   Resp: clear to auscultation bilaterally. Some pain with deep   inspiration.   Abdomen: soft, non tender, non distended. No masses,   organomegaly.  : NEFG. On speculum exam vagina and cervix  were normal. Small   amount of whitish/yellow mucus discharge noted around cervix. Wet   mount collected. On bimanual, uterus small and mobile, no CMT, no   adnexal tenderness or fullness. Bladder full.   Extremities: Warm, well perfused. No rashes, lesions or bilateral   lower extremity edema.          Data:     Results for orders placed or performed during the hospital   encounter of 08/15/17 (from the past 24 hour(s))   EKG 12-lead, tracing only   Result Value Ref Range    Interpretation ECG Click View Image link to view waveform and   result    CBC with platelets differential   Result Value Ref Range    WBC 6.4 4.0 - 11.0 10e9/L    RBC Count 4.06 3.8 - 5.2 10e12/L    Hemoglobin 12.6 11.7 - 15.7 g/dL    Hematocrit 36.2 35.0 - 47.0 %    MCV 89 78 - 100 fl    MCH 31.0 26.5 - 33.0 pg    MCHC 34.8 31.5 - 36.5 g/dL    RDW 12.3 10.0 - 15.0 %    Platelet Count 187 150 - 450 10e9/L    Diff Method Automated Method     % Neutrophils 67.1 %    % Lymphocytes 19.9 %    % Monocytes 6.1 %    % Eosinophils 6.4 %    % Basophils 0.3 %    % Immature Granulocytes 0.2 %    Nucleated RBCs 0 0 /100    Absolute Neutrophil 4.3 1.6 - 8.3 10e9/L    Absolute Lymphocytes 1.3 0.8 - 5.3 10e9/L    Absolute Monocytes 0.4 0.0 - 1.3 10e9/L    Absolute Eosinophils 0.4 0.0 - 0.7 10e9/L    Absolute Basophils 0.0 0.0 - 0.2 10e9/L    Abs Immature Granulocytes 0.0 0 - 0.4 10e9/L    Absolute Nucleated RBC 0.0    D dimer quantitative   Result Value Ref Range    D Dimer 3.2 (H) 0.0 - 0.50 ug/ml FEU   Basic metabolic panel   Result Value Ref Range    Sodium 138 133 - 144 mmol/L    Potassium 4.2 3.4 - 5.3 mmol/L    Chloride 106 94 - 109 mmol/L    Carbon Dioxide 27 20 - 32 mmol/L    Anion Gap 6 3 - 14 mmol/L    Glucose 94 70 - 99 mg/dL    Urea Nitrogen 6 (L) 7 - 30 mg/dL    Creatinine 0.62 0.52 - 1.04 mg/dL    GFR Estimate >90 >60 mL/min/1.7m2    GFR Estimate If Black >90 >60 mL/min/1.7m2    Calcium 8.4 (L) 8.5 - 10.1 mg/dL   Hepatic panel   Result Value Ref  Range    Bilirubin Direct <0.1 0.0 - 0.2 mg/dL    Bilirubin Total 0.2 0.2 - 1.3 mg/dL    Albumin 3.4 3.4 - 5.0 g/dL    Protein Total 7.0 6.8 - 8.8 g/dL    Alkaline Phosphatase 58 40 - 150 U/L    ALT 28 0 - 50 U/L    AST 26 0 - 45 U/L   INR   Result Value Ref Range    INR Quantity not sufficient 0.86 - 1.14   Partial thromboplastin time   Result Value Ref Range    PTT Quantity not sufficient 22 - 37 sec   hCG qual urine POCT   Result Value Ref Range    HCG Qual Urine Negative neg    Internal QC OK Yes    XR Chest 2 Views    Narrative    XR CHEST 2 VW  8/15/2017 11:42 AM      HISTORY: left sided chest pain    COMPARISON: 12/7/2010    FINDINGS: PA and lateral views of the chest upright. The  cardiomediastinal silhouette and pulmonary vasculature are stable   and  within normal limits. There is no focal airspace opacity, pleural  effusion, or pneumothorax. The visualized upper abdomen, osseous  structures, and soft tissues are unremarkable.      Impression    IMPRESSION: Clear lungs.     I have personally reviewed the examination and initial   interpretation  and I agree with the findings.    FERMIN HOBBS MD   CT Chest Pulmonary Embolism w Contrast   Result Value Ref Range    Radiologist flags Bilateral pulmonary emboli (AA)     Addendum: 8/15/2017    Addendum:    Three-dimensional (3D) post-processed angiographic images were  reconstructed, archived to PACS and used in interpretation of   this  study.    ANNETTE HAWKINS MD      Narrative    Exam: Chest CT Pulmonary Angiogram 8/15/2017 12:57 PM..    History: Left lateral chest pain.    Comparison: None.    Technique: Volumetric helical acquisition of the chest was   obtained  following pulmonary embolism protocol.     Findings:    There is adequate opacification of the pulmonary arteries. There   are  bilateral central pulmonary artery embolisms including the left   lower  lobe lobar pulmonary artery, which is near occlusive, extending   into  lower lobe segmental  branches, and extending partially into the   left  upper lobe pulmonary artery. There is additionally a right   pulmonary  artery lobar branch embolism extending into the superior   segmental  branches and lower lobe segmental branches. There is somewhat  increased attenuation of the anterior upper mediastinal fat,   which is  likely non-opacified innominate vessels, or possibly thymic   tissue  anteriorly.    No evidence of right heart strain. No significant mediastinal  lymphadenopathy.    Bilateral wedge-shaped peripheral lower lobe opacities. Bibasilar  subsegmental atelectasis. Sub-4 mm nodular opacities along the   left  major fissure (series 7, image 138).    Limited visualized upper abdomen. Splenule.    No suspicious bony lesions.       Impression    Impression:   1. Extensive bilateral pulmonary emboli. No evidence of right   heart  strain.  2. Bilateral lower lobe peripheral infarcts.  3. Sub-4 mm nodular nodules along the left major fissure may   represent  fissural lymph nodes and do not require any additional routine  follow-up in a low risk patient per Fleischner criteria   guidelines.    [Critical Result: Bilateral pulmonary emboli]    Finding was identified on 8/15/2017 12:51 PM.     Dr. Gutiérrez was contacted by Dr. Zamudio at 8/15/2017 12:52 PM and  verbalized understanding of the critical finding.     I have personally reviewed the examination and initial   interpretation  and I agree with the findings.    ANNETTE HAWKINS MD   Troponin I   Result Value Ref Range    Troponin I ES <0.015 0.000 - 0.045 ug/L   ECHO COMPLETE WITH OPTISON    Narrative    [id number]  [id number]  [id number]  [id number]^FIDEL^EDDIE^TRUE           [facility],[place]  Echocardiography Laboratory  [address] [place] [id number]     Name: MORENA PALMER  MRN: [id number]  : 1995  Study Date: 08/15/2017 02:33 PM  Age: 21 yrs  Gender: Female  Patient Location: [facility]  Reason For Study: Pulmonary  Embolism  Ordering Physician: EDDIE PARRA  Performed By: Andrea Mares RDCS     BSA: 1.5 m2  Height: 62 in  Weight: 119 lb  BP: 119/87 mmHg  __________________________________________________________________  ___________  __        Procedure  Complete Portable Echo Adult. Contrast Optison. Optison (NDC   #2861-7913-05)  given intravenously. Patient was given 3 ml mixture of 3 ml   Optison and 6 ml  saline. 6 ml wasted.  __________________________________________________________________  ___________  __        Interpretation Summary  Global and regional left ventricular function is normal with an   EF of 55-60%.  The right ventricle is normal size. Global right ventricular   function is  normal.  No significant valvular abnormalities were noted.  Right ventricular systolic pressure is 18mmHg above the right   atrial pressure  (Pulmonary artery pressure is normal)  The inferior vena cava was normal in size with preserved   respiratory  variability.  No pericardial effusion is present.  Previous study not available for comparison.  __________________________________________________________________  ___________  __        Left Ventricle  Left ventricular size is normal. Left ventricular wall thickness   is normal.  Global and regional left ventricular function is normal with an   EF of 55-60%.  Normal left ventricular filling for age.     Right Ventricle  The right ventricle is normal size. Global right ventricular   function is  normal.     Atria  Both atria appear normal.        Mitral Valve  The mitral valve is normal. Trace mitral insufficiency is   present.     Aortic Valve  Aortic valve is normal in structure and function. The aortic   valve is  tricuspid.     Tricuspid Valve  The tricuspid valve is normal. Trace tricuspid insufficiency is   present. Right  ventricular systolic pressure is 18mmHg above the right atrial   pressure.     Pulmonic Valve  The pulmonic valve is normal.     Vessels  The inferior vena  cava was normal in size with preserved   respiratory  variability. The aorta root is normal. Estimated mean right   atrial pressure is  3 mmHg.     Pericardium  No pericardial effusion is present.        Compared to Previous Study  Previous study not available for comparison.  __________________________________________________________________  ___________  __     MMode/2D Measurements & Calculations  IVSd: 0.67 cm  LVIDd: 4.6 cm  LVIDs: 3.2 cm  LVPWd: 0.55 cm  FS: 30.4 %  EDV(Teich): 97.3 ml  ESV(Teich): 41.0 ml  LV mass(C)d: 83.7 grams  LV mass(C)dI: 54.6 grams/m2  Ao root diam: 2.9 cm  LVOT diam: 1.8 cm  LVOT area: 2.5 cm2        Doppler Measurements & Calculations  MV E max nichole: 84.1 cm/sec  MV A max nichole: 54.0 cm/sec  MV E/A: 1.6  MV dec time: 0.16 sec  PA acc time: 0.20 sec  TR max nichole: 209.0 cm/sec  TR max P.5 mmHg  Lateral E/e': 6.3  Medial E/e': 10.1              __________________________________________________________________  ___________  __        Report approved by: Robert Li 08/15/2017 04:17 PM      CBC with platelets differential   Result Value Ref Range    WBC 8.2 4.0 - 11.0 10e9/L    RBC Count 3.83 3.8 - 5.2 10e12/L    Hemoglobin 11.8 11.7 - 15.7 g/dL    Hematocrit 34.5 (L) 35.0 - 47.0 %    MCV 90 78 - 100 fl    MCH 30.8 26.5 - 33.0 pg    MCHC 34.2 31.5 - 36.5 g/dL    RDW 12.3 10.0 - 15.0 %    Platelet Count 185 150 - 450 10e9/L    Diff Method Automated Method     % Neutrophils 56.5 %    % Lymphocytes 29.3 %    % Monocytes 7.9 %    % Eosinophils 6.0 %    % Basophils 0.2 %    % Immature Granulocytes 0.1 %    Nucleated RBCs 0 0 /100    Absolute Neutrophil 4.6 1.6 - 8.3 10e9/L    Absolute Lymphocytes 2.4 0.8 - 5.3 10e9/L    Absolute Monocytes 0.7 0.0 - 1.3 10e9/L    Absolute Eosinophils 0.5 0.0 - 0.7 10e9/L    Absolute Basophils 0.0 0.0 - 0.2 10e9/L    Abs Immature Granulocytes 0.0 0 - 0.4 10e9/L    Absolute Nucleated RBC 0.0    Reticulocyte count   Result Value Ref Range    % Retic 1.0  0.5 - 2.0 %    Absolute Retic 37.2 25 - 95 10e9/L   INR   Result Value Ref Range    INR 1.14 0.86 - 1.14   Partial thromboplastin time   Result Value Ref Range    PTT 40 (H) 22 - 37 sec             Assessment and Plan:   Assessment: 21 year old with hx of dysmenorrhea and heavy   bleeding menstrual bleeding controlled by use of continuous   estrogen containing oral contraceptive pills who presents with   bilateral pulmonary emboli, which is being managed by hematology.   This thromboembolic event precludes use of estrogen containing   contraceptives in the future. Though the patient is not currently   sexually active, it is important she does not become pregnant   until this clot is treated as this would cause a more   hypercoagulable state. It would also be beneficial for the   patient to be on a type of contraception that would improve the   amount of bleeding and symptoms during her periods, thus we would   recommend some type of progesterone only form of contraception.     Discussed pills, depo, nexplanon, and levonorgestrel IUD and the   patient is undecided, but leaning toward an IUD.  This can be   placed either inpatient before discharge or she may see her   primary Ob/Gyn provider after discharge to schedule an   appointment for IUD placement if she prefers.    She will likely have a withdrawal bleed in the next day or two as   OCPs are being held. Given history of relatively heavy menstrual   bleeding and current anticoagulation, the patient could   potentially have a heavy withdrawal bleed. Her hemoglobin is   normal currently. Please notify us if the patient has ongoing   heavy bleeding (ie fully saturates more than 2 pads in 2 hours)   in case an acute intervention is needed to decrease her bleeding.       Recommendations:  - progesterone only contraception, likely IUD. Either inpatient   or outpatient is fine (we will follow up with patient to see what   she prefers).   - Agree with full hematologic  workup, especially in the setting   of her strong family history   - please notify Gyn team if patient begins to have worrisome   bleeding of >2 pads/hour for >2 hours. Bleeding in the next few   days is to be expected and would not be cause for concern given   recent cessation of OCPs.   - can manage menstrual symptoms with scheduled NSAIDs and prn   anti-emetics, which the patient already has ordered.   - will follow up wet mount that was collected for abnormal   discharge and treat as needed.      Thank you for the opportunity to participate in this patient's   care.     Discussed with staff Dr. Yost.  Celia Castanon MD  OB/GYN Resident PGY-2  Gyn Onc Team Pager [phone] (Daytime GYN pager [phone]).   8/15/2017 7:29 PM          hCG qual urine POCT   Result Value Ref Range    HCG Qual Urine Negative neg    Internal QC OK Yes    ECHO COMPLETE WITH OPTISON    Narrative    [id number]  [id number]  [id number]  [id number]^FIDEL^EDDIE^TRUE           [facility]  Echocardiography Laboratory  [address] [place] [id number]     Name: MORENA PALMER  MRN: [id number]  : 1995  Study Date: 08/15/2017 02:33 PM  Age: 21 yrs  Gender: Female  Patient Location: [facility]  Reason For Study: Pulmonary Embolism  Ordering Physician: EDDIE PARRA  Performed By: Andrea Mares RDCS     BSA: 1.5 m2  Height: 62 in  Weight: 119 lb  BP: 119/87 mmHg  _____________________________________________________________________________  __        Procedure  Complete Portable Echo Adult. Contrast Optison. Optison (NDC #6103-3085-48)  given intravenously. Patient was given 3 ml mixture of 3 ml Optison and 6 ml  saline. 6 ml wasted.  _____________________________________________________________________________  __        Interpretation Summary  Global and regional left ventricular function is normal with an EF of 55-60%.  The right ventricle is normal size. Global right ventricular function is  normal.  No  significant valvular abnormalities were noted.  Right ventricular systolic pressure is 18mmHg above the right atrial pressure  (Pulmonary artery pressure is normal)  The inferior vena cava was normal in size with preserved respiratory  variability.  No pericardial effusion is present.  Previous study not available for comparison.  _____________________________________________________________________________  __        Left Ventricle  Left ventricular size is normal. Left ventricular wall thickness is normal.  Global and regional left ventricular function is normal with an EF of 55-60%.  Normal left ventricular filling for age.     Right Ventricle  The right ventricle is normal size. Global right ventricular function is  normal.     Atria  Both atria appear normal.        Mitral Valve  The mitral valve is normal. Trace mitral insufficiency is present.     Aortic Valve  Aortic valve is normal in structure and function. The aortic valve is  tricuspid.     Tricuspid Valve  The tricuspid valve is normal. Trace tricuspid insufficiency is present. Right  ventricular systolic pressure is 18mmHg above the right atrial pressure.     Pulmonic Valve  The pulmonic valve is normal.     Vessels  The inferior vena cava was normal in size with preserved respiratory  variability. The aorta root is normal. Estimated mean right atrial pressure is  3 mmHg.     Pericardium  No pericardial effusion is present.        Compared to Previous Study  Previous study not available for comparison.  _____________________________________________________________________________  __     MMode/2D Measurements & Calculations  IVSd: 0.67 cm  LVIDd: 4.6 cm  LVIDs: 3.2 cm  LVPWd: 0.55 cm  FS: 30.4 %  EDV(Teich): 97.3 ml  ESV(Teich): 41.0 ml  LV mass(C)d: 83.7 grams  LV mass(C)dI: 54.6 grams/m2  Ao root diam: 2.9 cm  LVOT diam: 1.8 cm  LVOT area: 2.5 cm2        Doppler Measurements & Calculations  MV E max nichole: 84.1 cm/sec  MV A max nichole: 54.0 cm/sec  MV  E/A: 1.6  MV dec time: 0.16 sec  PA acc time: 0.20 sec  TR max nichole: 209.0 cm/sec  TR max P.5 mmHg  Lateral E/e': 6.3  Medial E/e': 10.1              _____________________________________________________________________________  __        Report approved by: Robert Li 08/15/2017 04:17 PM      Wet prep   Result Value Ref Range    Specimen Description Genital     Wet Prep No Trichomonas seen     Wet Prep No clue cells seen     Wet Prep No yeast seen     Wet Prep Many  WBC'S seen         Recent Results (from the past 48 hour(s))   XR Chest 2 Views    Narrative    XR CHEST 2 VW  8/15/2017 11:42 AM      HISTORY: left sided chest pain    COMPARISON: 2010    FINDINGS: PA and lateral views of the chest upright. The  cardiomediastinal silhouette and pulmonary vasculature are stable and  within normal limits. There is no focal airspace opacity, pleural  effusion, or pneumothorax. The visualized upper abdomen, osseous  structures, and soft tissues are unremarkable.      Impression    IMPRESSION: Clear lungs.     I have personally reviewed the examination and initial interpretation  and I agree with the findings.    FERMIN HOBBS MD   CT Chest Pulmonary Embolism w Contrast   Result Value    Radiologist flags Bilateral pulmonary emboli (AA)    Addendum: 8/15/2017    Addendum:    Three-dimensional (3D) post-processed angiographic images were  reconstructed, archived to PACS and used in interpretation of this  study.    ANNETTE HAWKINS MD      Narrative    Exam: Chest CT Pulmonary Angiogram 8/15/2017 12:57 PM..    History: Left lateral chest pain.    Comparison: None.    Technique: Volumetric helical acquisition of the chest was obtained  following pulmonary embolism protocol.     Findings:    There is adequate opacification of the pulmonary arteries. There are  bilateral central pulmonary artery embolisms including the left lower  lobe lobar pulmonary artery, which is near occlusive, extending into  lower lobe  segmental branches, and extending partially into the left  upper lobe pulmonary artery. There is additionally a right pulmonary  artery lobar branch embolism extending into the superior segmental  branches and lower lobe segmental branches. There is somewhat  increased attenuation of the anterior upper mediastinal fat, which is  likely non-opacified innominate vessels, or possibly thymic tissue  anteriorly.    No evidence of right heart strain. No significant mediastinal  lymphadenopathy.    Bilateral wedge-shaped peripheral lower lobe opacities. Bibasilar  subsegmental atelectasis. Sub-4 mm nodular opacities along the left  major fissure (series 7, image 138).    Limited visualized upper abdomen. Splenule.    No suspicious bony lesions.       Impression    Impression:   1. Extensive bilateral pulmonary emboli. No evidence of right heart  strain.  2. Bilateral lower lobe peripheral infarcts.  3. Sub-4 mm nodular nodules along the left major fissure may represent  fissural lymph nodes and do not require any additional routine  follow-up in a low risk patient per Fleischner criteria guidelines.    [Critical Result: Bilateral pulmonary emboli]    Finding was identified on 8/15/2017 12:51 PM.     Dr. Gutiérrez was contacted by Dr. Zamudio at 8/15/2017 12:52 PM and  verbalized understanding of the critical finding.     I have personally reviewed the examination and initial interpretation  and I agree with the findings.    ANNETTE HAWKINS MD                Pending Results:   Unresulted Labs Ordered in the Past 30 Days of this Admission     Date and Time Order Name Status Description    8/16/2017 0001 Protein S Antigen Total and Free In process     8/15/2017 1759 Factor 5 Leiden Mutation Analysis In process     8/15/2017 1759 Blood Morphology Pathologist Review In process     8/15/2017 1040 Activated protein C resistance In process     8/15/2017 1040 Antithrombin III In process                   Discharge Instructions and  Follow-Up:     Discharge Procedure Orders  Reason for your hospital stay   Order Comments: Pulmonary embolism seen on chest CT scan.  Echocardiogram of heart normal.  Hematology consulted- recommended xarelto for at least 6 months, then see hematology in about 6 months (prior to stopping xarelto) to discuss further management.     Adult [facility] Follow-up and recommended labs and tests   Order Comments: Follow up with primary care provider, Lindsay Ley, within 7 days for hospital follow- up.  Follow up with hematology in 6 months  Appointments on [place] and/or [facility] (with [facility] or [facility] provider or service). Call [phone] if you haven't heard regarding these appointments within 7 days of discharge.     Activity   Order Comments: Your activity upon discharge: activity as tolerated   Order Specific Question Answer Comments   Is discharge order? Yes      When to contact your care team   Order Comments: Return to the ER if you have worsening pain, worsening breathing, fever, fainting, uncontrollable bleeding     Diet   Order Comments: Follow this diet upon discharge: Orders Placed This Encounter     Regular Diet Adult   Order Specific Question Answer Comments   Is discharge order? Yes             Attestation:  Ria Hylton.  APRN, CNP

## 2017-08-17 PROBLEM — I26.99 PE (PULMONARY THROMBOEMBOLISM) (H): Status: ACTIVE | Noted: 2017-08-17

## 2017-08-17 PROCEDURE — 99254 IP/OBS CNSLTJ NEW/EST MOD 60: CPT | Mod: GC | Performed by: INTERNAL MEDICINE

## 2017-08-17 PROCEDURE — 25000132 ZZH RX MED GY IP 250 OP 250 PS 637: Performed by: NURSE PRACTITIONER

## 2017-08-17 PROCEDURE — 99207 ZZC APP CREDIT; MD BILLING SHARED VISIT: CPT | Mod: Z6 | Performed by: FAMILY MEDICINE

## 2017-08-17 PROCEDURE — G0378 HOSPITAL OBSERVATION PER HR: HCPCS

## 2017-08-17 PROCEDURE — 12000001 ZZH R&B MED SURG/OB UMMC

## 2017-08-17 PROCEDURE — 25000132 ZZH RX MED GY IP 250 OP 250 PS 637: Performed by: STUDENT IN AN ORGANIZED HEALTH CARE EDUCATION/TRAINING PROGRAM

## 2017-08-17 PROCEDURE — 99233 SBSQ HOSP IP/OBS HIGH 50: CPT | Mod: GC | Performed by: INTERNAL MEDICINE

## 2017-08-17 RX ORDER — POLYETHYLENE GLYCOL 3350 17 G/17G
17 POWDER, FOR SOLUTION ORAL DAILY
Status: DISCONTINUED | OUTPATIENT
Start: 2017-08-17 | End: 2017-08-18

## 2017-08-17 RX ORDER — ACETAMINOPHEN 500 MG
1000 TABLET ORAL 3 TIMES DAILY
Status: DISCONTINUED | OUTPATIENT
Start: 2017-08-17 | End: 2017-08-19 | Stop reason: HOSPADM

## 2017-08-17 RX ORDER — IBUPROFEN 200 MG
400 TABLET ORAL EVERY 6 HOURS PRN
Status: DISCONTINUED | OUTPATIENT
Start: 2017-08-17 | End: 2017-08-19 | Stop reason: HOSPADM

## 2017-08-17 RX ORDER — HYDROXYZINE HYDROCHLORIDE 10 MG/1
10 TABLET, FILM COATED ORAL 3 TIMES DAILY PRN
Status: DISCONTINUED | OUTPATIENT
Start: 2017-08-17 | End: 2017-08-18

## 2017-08-17 RX ORDER — ACETAMINOPHEN 500 MG
1000 TABLET ORAL EVERY 8 HOURS PRN
Status: DISCONTINUED | OUTPATIENT
Start: 2017-08-17 | End: 2017-08-17

## 2017-08-17 RX ADMIN — OXYCODONE HYDROCHLORIDE 5 MG: 5 TABLET ORAL at 08:09

## 2017-08-17 RX ADMIN — RIVAROXABAN 15 MG: 15 TABLET, FILM COATED ORAL at 18:21

## 2017-08-17 RX ADMIN — ACETAMINOPHEN 650 MG: 325 TABLET, FILM COATED ORAL at 09:24

## 2017-08-17 RX ADMIN — DIPHENHYDRAMINE HYDROCHLORIDE 25 MG: 25 CAPSULE ORAL at 05:30

## 2017-08-17 RX ADMIN — IBUPROFEN 400 MG: 400 TABLET ORAL at 23:18

## 2017-08-17 RX ADMIN — HYDROXYZINE HYDROCHLORIDE 10 MG: 10 TABLET ORAL at 15:56

## 2017-08-17 RX ADMIN — IBUPROFEN 400 MG: 400 TABLET ORAL at 17:20

## 2017-08-17 RX ADMIN — OXYCODONE HYDROCHLORIDE 5 MG: 5 TABLET ORAL at 15:56

## 2017-08-17 RX ADMIN — OMEPRAZOLE 40 MG: 20 CAPSULE, DELAYED RELEASE ORAL at 07:56

## 2017-08-17 RX ADMIN — DOCUSATE SODIUM 100 MG: 100 CAPSULE, LIQUID FILLED ORAL at 07:58

## 2017-08-17 RX ADMIN — RIVAROXABAN 15 MG: 15 TABLET, FILM COATED ORAL at 07:56

## 2017-08-17 RX ADMIN — ACETAMINOPHEN 650 MG: 325 TABLET, FILM COATED ORAL at 05:30

## 2017-08-17 RX ADMIN — POLYETHYLENE GLYCOL 3350 17 G: 17 POWDER, FOR SOLUTION ORAL at 15:48

## 2017-08-17 RX ADMIN — OXYCODONE HYDROCHLORIDE 5 MG: 5 TABLET ORAL at 20:40

## 2017-08-17 RX ADMIN — ACETAMINOPHEN 650 MG: 325 TABLET, FILM COATED ORAL at 01:06

## 2017-08-17 RX ADMIN — OXYCODONE HYDROCHLORIDE 5 MG: 5 TABLET ORAL at 18:21

## 2017-08-17 RX ADMIN — ESCITALOPRAM OXALATE 10 MG: 10 TABLET ORAL at 07:56

## 2017-08-17 RX ADMIN — DIPHENHYDRAMINE HYDROCHLORIDE 25 MG: 25 CAPSULE ORAL at 01:06

## 2017-08-17 RX ADMIN — ACETAMINOPHEN 1000 MG: 500 TABLET, FILM COATED ORAL at 19:27

## 2017-08-17 RX ADMIN — HYDROXYZINE HYDROCHLORIDE 10 MG: 10 TABLET ORAL at 21:30

## 2017-08-17 RX ADMIN — OXYCODONE HYDROCHLORIDE 5 MG: 5 TABLET ORAL at 14:13

## 2017-08-17 RX ADMIN — ACETAMINOPHEN 650 MG: 325 TABLET, FILM COATED ORAL at 13:04

## 2017-08-17 RX ADMIN — ACETAMINOPHEN AND CODEINE PHOSPHATE 0.35 MG: 120; 12 SOLUTION ORAL at 15:48

## 2017-08-17 ASSESSMENT — PAIN DESCRIPTION - DESCRIPTORS: DESCRIPTORS: SHARP

## 2017-08-17 NOTE — PROGRESS NOTES
[facility], [place]    Internal Medicine Progress Note - [facility] Service    Main Plans for Today   -schedule tylenol  -discontinue ativan  -start hydroxyzine   -prn ibuprofen        Assessment & Plan     María Ortiz is a 21 year old women with a PMH significant for costochondritis, depression, GERD, and IBD who presented with rib pain, pleuritic chest pain found to have bilateral pulmonary emboli and now on anticoagulation    1.Bilateral PE, possibly unprovoked   Presented with pleuritic chest pain and rib pain. D-dimer was elevated. CT PE positive for bilateral PE. Patient had echo without evidence of right heart strain. Started on lovenox and then started on rivaroxoban. Continues to have pain. Will address by scheduling tylenol, and having PRN ibuprofen available   -  Xarelto 15 mg BID x 21 days then 20 mg daily with food.  - Pain management with scheduled tylenol 1000mg TID and prn oxycodone   - Follow up with hematology as an outpatient in 6 months       2. Dysmenorrhea - was on Levonorgestrel-ethinyl estradiol for birth control. In the setting of PE,  OB GYN  Was consulted for recommendations to switch birth control. She was started on an progesterone only pill and will follow up as an outpatient to have IUD placed   - micronor       3. IBS  - On bentyl      4. GERD  Currently takes Zofran, Prilosec, and dicyclomine      5. Depression/Anxiety - Continue on home Lexapro, discontinuing ativan.      Diet: Regular Diet Adult  Diet  Snacks/Supplements Adult: Other - Please comment; 10 AM - please send fruit loops with soy milk, 2 PM - please send banana bread + apple Ensure Clear, 8 PM - please send PB+J sandwich with fruit cup; Between Meals  Fluids: none   DVT Prophylaxis: on rivaroxaban   Code Status: Full Code    Disposition Plan   Expected discharge: 1-2 days, recommended to home when pain control is achieved.     Information in the above section will display in the discharge  planner report.      The patient's care was discussed with the Attending Physician, Dr. Weathers.    Rose Villalba MD  PGY2 [facility]  [place]: 5  Pager: [phone]  Please see sticky note for cross cover information    Interval History   -feels pain with breathing  -has nausea  -no abdominal pain  -no fever     -constitution: no fever  -PULM: pleuritic pain  -ABd: nausea  -Neuro: tingling in feet      Physical Exam   Vital Signs: Temp: 99.3  F (37.4  C) Temp src: Oral BP: 128/79 Pulse: 62 Heart Rate: 72 Resp: 18 SpO2: 98 % O2 Device: None (Room air)    Weight: 119 lbs 0 oz  General Appearance: NAD  Respiratory: clear lungs,   Cardiovascular: r/r/r, no murmurs, rubs or gallops   GI: soft, non-tender, bowel sounds appreciated   Skin: no lesions   Other: No lower extremity edema         Data   Data     Recent Labs  Lab 08/16/17  0743 08/15/17  2205 08/15/17  1932 08/15/17  1339 08/15/17  1040   WBC 6.2  --  8.2  --  6.4   HGB 11.7  --  11.8  --  12.6   MCV 90  --  90  --  89     --  185  --  187   INR  --  1.14  --   --  Quantity not sufficient     --   --   --  138   POTASSIUM 4.6  --   --   --  4.2   CHLORIDE 109  --   --   --  106   CO2 24  --   --   --  27   BUN 3*  --   --   --  6*   CR 0.65  --   --   --  0.62   ANIONGAP 5  --   --   --  6   STALIN 8.2*  --   --   --  8.4*   GLC 83  --   --   --  94   ALBUMIN  --   --   --   --  3.4   PROTTOTAL  --   --   --   --  7.0   BILITOTAL  --   --   --   --  0.2   ALKPHOS  --   --   --   --  58   ALT  --   --   --   --  28   AST  --   --   --   --  26   TROPI  --   --   --  <0.015  --

## 2017-08-17 NOTE — PROGRESS NOTES
Observation Unit Transfer Summary     Patient ID:  María Ortiz  MRN: [id number]  21 year old  YOB: 1995    Observation Admit Date: 8/15/2017    ED Admitting Attending: Kadi Gutiérrez MD    Transfer Date and Time: August 17, 2017 at 8:23 AM     Transferring Observation Provider: LIZANDRO Dooley CNP    Admission Diagnoses:     1. Encounter for initial prescription of contraceptive pills    2. Other pulmonary embolism without acute cor pulmonale, unspecified chronicity (H)        Transfer Diagnoses:    1. Bilateral pulmonary embolisms    Emergency Department and Observation Course:      María Ortiz is a 21 year old female with a history of depression, anxiety, costochondritis, IBS and acid reflux who presents to the Emergency Department for evaluation of left rib pain that radiates around her ribs into the left side of her back. CT chest showing PE.     1. Extensive bilateral pulmonary emboli. - Seen on CT chest pulmonary. No evidence of right heart strain. Reported Left lateral chest pain. Not hypoxic. EKG normal. D dimer 3.2. HCG negative. Echo completed Global and regional left ventricular function is normal with an EF of 55-60%.The right ventricle is normal size. Global right ventricular function is Normal. No significant valvular abnormalities were noted.  VSS stable today, afebrile, O2 sats normal on room air, but patient feels more SOB and worsening left sided chest pain.  She feels likes she can't take a deep breath.  Feels worse than yesterday.  Hematology consulted and recommended Xarelto for 6 months.  Follow up with hematology in 6 months prior to stopping Xarelto.   - Start Xarelto 15 mg BID x 21 days then 20 mg daily with food.  - Pain management with scheduled tylenol 650 mg po Q 4 hours, oxycodone prn  - Follow in in center for bleeding and clotting disorders in 6 months, PCP in one week  -at this time patient has failed observation management as she has worsening pain,  "tachypnea in the setting of extensive bilateral pulmonary emboli.  Spoke with Dr. Manning, medicine triage, who accepted the patient to their service.  UR states patient meets inpatient criteria.      2. Dysmenorrhea - Reprts dehydration, chills, vomiting and severe abdominal cramping  when she gets her menstrual cycle when she isn't on birth control. She takes Levonorgestrel-ethinyl estradiol. OB GYN recommended micronor (progesterone only) birth control until IUD can get placed.  - started micronor yesterday  - given # to make appt with OB GYN for IUD      3. IBS  - On bentyl      4. GERD scheduled for an upper GI scope in September  Currently takes Zofran, Prilosec, and dicyclomine      5. Depression/Anxiety - Continue on home Lexapro and Ativan prn.           Consults: hematology/oncology    DATA:    Transfer Exam:    /84  Pulse 91  Temp 98.1  F (36.7  C) (Oral)  Resp 20  Ht 1.575 m (5' 2\")  Wt 54 kg (119 lb)  SpO2 100%  BMI 21.77 kg/m2  Exam:  Constitutional: healthy, alert and anxious  Head: Normocephalic. No masses, lesions, tenderness or abnormalities  Cardiovascular: No lifts, heaves, or thrills. RRR. No murmurs, clicks gallops or rub  Respiratory: Good diaphragmatic excursion. Lungs clear, diminished in bases bilateral as she will not take a deep breath  Gastrointestinal: Abdomen soft, non-tender. BS normal. No masses, organomegaly  Musculoskeletal: extremities normal- no gross deformities noted, gait normal and normal muscle tone  Skin: no suspicious lesions or rashes  Neurologic: Gait normal. Alert and oriented.   Psychiatric: mentation appears normal and affect normal/bright    /84  Pulse 91  Temp 98.1  F (36.7  C) (Oral)  Resp 20  Ht 1.575 m (5' 2\")  Wt 54 kg (119 lb)  SpO2 100%  BMI 21.77 kg/m2        Current Medications:    Current Outpatient Prescriptions   Medication Sig Dispense Refill     norethindrone (MICRONOR) 0.35 MG per tablet Take 1 tablet (0.35 mg) by mouth daily 28 " tablet 3     oxyCODONE (ROXICODONE) 5 MG IR tablet Take 1-2 tablets (5-10 mg) by mouth every 4 hours as needed for moderate to severe pain 30 tablet 0     rivaroxaban ANTICOAGULANT (XARELTO) 15 MG TABS tablet Take 1 tablet (15 mg) by mouth 2 times daily (with meals) for 21 days 42 tablet 0     [START ON 9/7/2017] rivaroxaban ANTICOAGULANT (XARELTO) 20 MG TABS tablet Take 1 tablet (20 mg) by mouth daily (with dinner) 30 tablet 6       Medications Prior to Admission:    Prescriptions Prior to Admission   Medication Sig Dispense Refill Last Dose     LORazepam (ATIVAN PO) Take 1 mg by mouth 3 times daily as needed    8/14/2017 at prn     Escitalopram Oxalate (LEXAPRO PO) Take 10 mg by mouth daily    8/14/2017 at 700     OMEPRAZOLE PO Take 40 mg by mouth every morning   8/14/2017 at 700     DICYCLOMINE HCL PO Take 10-20 mg by mouth 2 times daily as needed    8/14/2017 at 700     Ondansetron (ZOFRAN ODT PO) Take 4 mg by mouth every 8 hours as needed for nausea   Past Week at prn     albuterol (PROAIR HFA/PROVENTIL HFA/VENTOLIN HFA) 108 (90 BASE) MCG/ACT Inhaler Inhale 2 puffs into the lungs every 4 hours as needed for shortness of breath / dyspnea or wheezing   Past Month at prn     NAPROXEN PO Take 250 mg by mouth 3 times daily as needed for moderate pain   8/15/2017 at 700     Multiple Vitamins-Minerals (WOMENS MULTIVITAMIN PLUS PO) Take 2 tablets by mouth daily   8/14/2017 at 700     [DISCONTINUED] levonorgestrel-ethinyl estradiol (AVIANE,ALESSE,LESSINA) 0.1-20 MG-MCG per tablet Take 1 tablet by mouth daily   8/14/2017 at 700       Significant Diagnostic Studies:     Results for orders placed or performed during the hospital encounter of 08/15/17   XR Chest 2 Views    Narrative    XR CHEST 2 VW  8/15/2017 11:42 AM      HISTORY: left sided chest pain    COMPARISON: 12/7/2010    FINDINGS: PA and lateral views of the chest upright. The  cardiomediastinal silhouette and pulmonary vasculature are stable and  within normal  limits. There is no focal airspace opacity, pleural  effusion, or pneumothorax. The visualized upper abdomen, osseous  structures, and soft tissues are unremarkable.      Impression    IMPRESSION: Clear lungs.     I have personally reviewed the examination and initial interpretation  and I agree with the findings.    FERMIN HOBBS MD   CT Chest Pulmonary Embolism w Contrast   Result Value Ref Range    Radiologist flags Bilateral pulmonary emboli (AA)     Addendum: 8/15/2017    Addendum:    Three-dimensional (3D) post-processed angiographic images were  reconstructed, archived to PACS and used in interpretation of this  study.    ANNETTE HAWKINS MD      Narrative    Exam: Chest CT Pulmonary Angiogram 8/15/2017 12:57 PM..    History: Left lateral chest pain.    Comparison: None.    Technique: Volumetric helical acquisition of the chest was obtained  following pulmonary embolism protocol.     Findings:    There is adequate opacification of the pulmonary arteries. There are  bilateral central pulmonary artery embolisms including the left lower  lobe lobar pulmonary artery, which is near occlusive, extending into  lower lobe segmental branches, and extending partially into the left  upper lobe pulmonary artery. There is additionally a right pulmonary  artery lobar branch embolism extending into the superior segmental  branches and lower lobe segmental branches. There is somewhat  increased attenuation of the anterior upper mediastinal fat, which is  likely non-opacified innominate vessels, or possibly thymic tissue  anteriorly.    No evidence of right heart strain. No significant mediastinal  lymphadenopathy.    Bilateral wedge-shaped peripheral lower lobe opacities. Bibasilar  subsegmental atelectasis. Sub-4 mm nodular opacities along the left  major fissure (series 7, image 138).    Limited visualized upper abdomen. Splenule.    No suspicious bony lesions.       Impression    Impression:   1. Extensive bilateral  pulmonary emboli. No evidence of right heart  strain.  2. Bilateral lower lobe peripheral infarcts.  3. Sub-4 mm nodular nodules along the left major fissure may represent  fissural lymph nodes and do not require any additional routine  follow-up in a low risk patient per Fleischner criteria guidelines.    [Critical Result: Bilateral pulmonary emboli]    Finding was identified on 8/15/2017 12:51 PM.     Dr. Gutiérrez was contacted by Dr. Zamudio at 8/15/2017 12:52 PM and  verbalized understanding of the critical finding.     I have personally reviewed the examination and initial interpretation  and I agree with the findings.    ANNETTE HAWKINS MD   CBC with platelets differential   Result Value Ref Range    WBC 6.4 4.0 - 11.0 10e9/L    RBC Count 4.06 3.8 - 5.2 10e12/L    Hemoglobin 12.6 11.7 - 15.7 g/dL    Hematocrit 36.2 35.0 - 47.0 %    MCV 89 78 - 100 fl    MCH 31.0 26.5 - 33.0 pg    MCHC 34.8 31.5 - 36.5 g/dL    RDW 12.3 10.0 - 15.0 %    Platelet Count 187 150 - 450 10e9/L    Diff Method Automated Method     % Neutrophils 67.1 %    % Lymphocytes 19.9 %    % Monocytes 6.1 %    % Eosinophils 6.4 %    % Basophils 0.3 %    % Immature Granulocytes 0.2 %    Nucleated RBCs 0 0 /100    Absolute Neutrophil 4.3 1.6 - 8.3 10e9/L    Absolute Lymphocytes 1.3 0.8 - 5.3 10e9/L    Absolute Monocytes 0.4 0.0 - 1.3 10e9/L    Absolute Eosinophils 0.4 0.0 - 0.7 10e9/L    Absolute Basophils 0.0 0.0 - 0.2 10e9/L    Abs Immature Granulocytes 0.0 0 - 0.4 10e9/L    Absolute Nucleated RBC 0.0    D dimer quantitative   Result Value Ref Range    D Dimer 3.2 (H) 0.0 - 0.50 ug/ml FEU   Basic metabolic panel   Result Value Ref Range    Sodium 138 133 - 144 mmol/L    Potassium 4.2 3.4 - 5.3 mmol/L    Chloride 106 94 - 109 mmol/L    Carbon Dioxide 27 20 - 32 mmol/L    Anion Gap 6 3 - 14 mmol/L    Glucose 94 70 - 99 mg/dL    Urea Nitrogen 6 (L) 7 - 30 mg/dL    Creatinine 0.62 0.52 - 1.04 mg/dL    GFR Estimate >90 >60 mL/min/1.7m2    GFR Estimate If  Black >90 >60 mL/min/1.7m2    Calcium 8.4 (L) 8.5 - 10.1 mg/dL   Hepatic panel   Result Value Ref Range    Bilirubin Direct <0.1 0.0 - 0.2 mg/dL    Bilirubin Total 0.2 0.2 - 1.3 mg/dL    Albumin 3.4 3.4 - 5.0 g/dL    Protein Total 7.0 6.8 - 8.8 g/dL    Alkaline Phosphatase 58 40 - 150 U/L    ALT 28 0 - 50 U/L    AST 26 0 - 45 U/L   Troponin I   Result Value Ref Range    Troponin I ES <0.015 0.000 - 0.045 ug/L   Blood Morphology Pathologist Review   Result Value Ref Range    Copath Report       Patient Name: MORENA PALMER  MR#: [id number]  Specimen #: [id number]  Collected: 8/15/2017  Received: 8/16/2017  Reported: 8/16/2017 15:52  Ordering Phy(s): DONALDO PARTIDA    For improved result formatting, select 'View Enhanced Report Format'  under Linked Documents section.    TEST(S):  Blood Smear Morphology    FINAL DIAGNOSIS:  Peripheral blood smear:       Non-anemic peripheral blood       Normal leukocyte count and differential       Normal platelet count and morphology    I have personally reviewed all specimens and/or slides, including the  listed special stains, and used them with my medical judgment to  determine the final diagnosis.    Electronically signed out by:  GT Arteaga    Technical testing/processing performed at [facility], [place]    CLINICAL HISTORY:  21 year old female with history of IBS admitted for extensive bilateral  pulmonary emboli. Medications include naproxen and estrogen-conta ining  birth control at home.    MICROSCOPIC DESCRIPTION:  Peripheral Blood  PERIPHERAL BLOOD DATA (Date: 08/15/2017)         Patient Value      (Reference range 18 years and older female)             8.2     WBC (4.0-11.0 x10*9/L)           3.83     RBC (3.8-5.2 x10*12/L)           11.8     Hgb (11.7-15.7 g/dl)           90     MCV ( fl)           34.2     MCHC (31.5-36.5 g/dL)           12.3     RDW (10.0-15.0 %)           185     Plt  (150-450 x10*9/L)           37.2    RETIC (25-95x10*9/L)    PERIPHERAL BLOOD DIFFERENTIAL (automated):                                     (Reference range 18 years and older)  Percent       Neutrophils,         segmented and bands       56.5%       Lymphocytes     29.3       Monocytes     7.9       Eosinophils     6.0       Basophils     0.2       Immature granulocytes     0.1    Absolute       Neutrophils,         segmented and bands     4.6     (1.6-8.3)       Lymphocytes     2.4      (0.8-5.3)       Monocytes     0.7      (0-1.3)       Eosinop hils     0.5     (0-0.7)       Basophils     0.0     (0-0.2)       Immature granulocytes     0.0    The red blood cells appear normochromic. Poikilocytosis includes  numerous echinocytes.  Polychromasia is not increased. Rouleaux  formation is not increased.  The morphology of the platelets is normal.    Reporting Fellow: LUPE Tony MD    CPT Codes:  A: 16507-HPHPT    TESTING LAB LOCATION:  [facility], [address]   [address] [phone]    COLLECTION SITE:  Client:  [facility]  Location:  [facility] (B)     Activated protein C resistance   Result Value Ref Range    Activated Prot C Resistance Ratio Canceled, Test credited >2.12   Antithrombin III   Result Value Ref Range    Antithrombin III Chromogenic Canceled, Test credited 85 - 135 %   CBC with platelets differential   Result Value Ref Range    WBC 8.2 4.0 - 11.0 10e9/L    RBC Count 3.83 3.8 - 5.2 10e12/L    Hemoglobin 11.8 11.7 - 15.7 g/dL    Hematocrit 34.5 (L) 35.0 - 47.0 %    MCV 90 78 - 100 fl    MCH 30.8 26.5 - 33.0 pg    MCHC 34.2 31.5 - 36.5 g/dL    RDW 12.3 10.0 - 15.0 %    Platelet Count 185 150 - 450 10e9/L    Diff Method Automated Method     % Neutrophils 56.5 %    % Lymphocytes 29.3 %    % Monocytes 7.9 %    % Eosinophils 6.0 %    % Basophils 0.2 %    % Immature Granulocytes 0.1 %     Nucleated RBCs 0 0 /100    Absolute Neutrophil 4.6 1.6 - 8.3 10e9/L    Absolute Lymphocytes 2.4 0.8 - 5.3 10e9/L    Absolute Monocytes 0.7 0.0 - 1.3 10e9/L    Absolute Eosinophils 0.5 0.0 - 0.7 10e9/L    Absolute Basophils 0.0 0.0 - 0.2 10e9/L    Abs Immature Granulocytes 0.0 0 - 0.4 10e9/L    Absolute Nucleated RBC 0.0    Reticulocyte count   Result Value Ref Range    % Retic 1.0 0.5 - 2.0 %    Absolute Retic 37.2 25 - 95 10e9/L   INR   Result Value Ref Range    INR Quantity not sufficient 0.86 - 1.14   Partial thromboplastin time   Result Value Ref Range    PTT Quantity not sufficient 22 - 37 sec   INR   Result Value Ref Range    INR 1.14 0.86 - 1.14   Partial thromboplastin time   Result Value Ref Range    PTT 40 (H) 22 - 37 sec   CBC with platelets   Result Value Ref Range    WBC 6.2 4.0 - 11.0 10e9/L    RBC Count 3.80 3.8 - 5.2 10e12/L    Hemoglobin 11.7 11.7 - 15.7 g/dL    Hematocrit 34.3 (L) 35.0 - 47.0 %    MCV 90 78 - 100 fl    MCH 30.8 26.5 - 33.0 pg    MCHC 34.1 31.5 - 36.5 g/dL    RDW 12.5 10.0 - 15.0 %    Platelet Count 176 150 - 450 10e9/L   Basic metabolic panel   Result Value Ref Range    Sodium 138 133 - 144 mmol/L    Potassium 4.6 3.4 - 5.3 mmol/L    Chloride 109 94 - 109 mmol/L    Carbon Dioxide 24 20 - 32 mmol/L    Anion Gap 5 3 - 14 mmol/L    Glucose 83 70 - 99 mg/dL    Urea Nitrogen 3 (L) 7 - 30 mg/dL    Creatinine 0.65 0.52 - 1.04 mg/dL    GFR Estimate >90 >60 mL/min/1.7m2    GFR Estimate If Black >90 >60 mL/min/1.7m2    Calcium 8.2 (L) 8.5 - 10.1 mg/dL   EKG 12-lead, tracing only   Result Value Ref Range    Interpretation ECG Click View Image link to view waveform and result    Hematology IP Consult: Patient to be seen: Routine - within 24 hours; Extensive bilateral pulmonary emboli. NO HX.; Consultant may enter orders: Yes    Narrative    Sari Weller MD     8/16/2017  4:08 PM  [facility], [place]    Hematology Consultation    Date of Admission:   "8/15/2017    Assessment & Plan   María Ortiz is a 21 year old female who was admitted on   8/15/2017. I was asked to see the patient for PE.    Possible provoked PE vs unprovoked PE: 8/15/17 on chest CT angio   b/l and extensive with presentation of pain and sob.  She has   been \"sick\" for the past couple of months and is on an estrogen   containing but for 10 years.  Usually happens in the first 2   years.  At this time due to the extent of the clot we recommend 6   months of treatment which at that time she will follow up with   Dr. Cain to determine the risks and benefits of continuing   treatment.  Her family history will play a role possibly in this   decision.  We did discuss with patient the risks of bleeding   associated with rivaroxaban and to avoid contact sports.  We   reported that she may have increased menstrual bleeding due to   the medication.      Although with patients family history and possibly unprovoked   clot we do not recommend thrombophilia testing for inherited   disorders.  This is mostly because it will not change our   management decisions.  Please refer to allison amaya Guidance for   the evaluation and treatment of hereditary and acquired   thrombophilia. Journal of Thrombosis and Thrombolysis  January 2016, Volume 41, Issue 1, pp 154-164.    Plan  -- continue rivaroxaban for at least 6 months  -- our clinic will contact patient with FU with Dr. Cain.  -- please on discharge give patient our clinic number if she has   complications or further questions.  Center of bleeding and   clotting disorders [phone].    Case and plan discussed with Dr. Payton Weller MD  PGY4  Hematology Oncology Fellow    Reason for Consult   Reason for consult: PE while on OCP    Primary Care Physician   Lindsay Ley    Chief Complaint   CP    History of Present Illness   María Ortiz is a 21 year old female with a history of   depression, anxiety, costochondritis, IBS and acid reflux " who   presents to the Emergency Department for evaluation of left rib   pain that radiates around her ribs into the left side of her   back. Patient reports this pain is similar to past   costochondritis symptoms. She reports she first started   experiencing episodes of costochondritis in 5/2016 and this   episodes started on 8/12/2017 (3 days ago). Patient reports using   a heat pad and naproxen with no relief of her pain. Patient   reports using 3, 20 mg tablets of naproxen when she has a flare   up of pain. She notes increased pain with breathing and movement   which is causing shortness of breath. She also notes some mild   increase in pain with palpation. She also complains of some   numbness and aching in her left arm when she has a flare up of   pain in her ribs. Patient reports having nausea and vomiting   recently with her last episode of vomiting on 8/12/2017. She   denies currently having any nausea. Patient denies any recent   hospitalizations or surgeries, long trips, recent long drives or   any smoking. She denies any blood clotting disorders. She has a   family history of blood clots.    ALso to note for the past few months she has had severe nausea   and vomiting where she is soon going to get EGD and colonoscopy.    It has made her more sedentary then usual     In the ED   Vitals:BP: 117/77 Pulse: 91 Temp: 98.7 Resp: 18 SP02:99 % Labs:    Sodium 138, Potassium 4.2, Creatinine 0.62, GFR >90, LFTS stable.   HCG negative, WBC 6.4, Hgb 12.6, Plt 187, Troponin negative, D   dimer 3.2  Medications:Received Lovenox in the ED. Patient's pain   was managed in the ED with Toradol, Morphine, and Tramadol.    Imaging:Extensive bilateral pulmonary emboli. No evidence of   right heart Strain. Bilateral lower lobe peripheral infarcts.   Sub-4 mm nodular nodules along the left major fissure may   represent fissural lymph nodes and do not require any additional   routine follow-up in a low risk patient per  Fleischner criteria   guidelines  Echo completed Global and regional left ventricular   function is normal with an EF of 55-60%.The right ventricle is   normal size. Global right ventricular function is Normal. No   significant valvular abnormalities were noted. Patient was   admitted in obs unit, and gyn was consulted.  Started patient on   progesterone only OCP and she will FU for IUD placement.      Past Medical History    Anxiety/MDD  IBS    Past Surgical History   Right shoulder surgery 2012    Prior to Admission Medications   Prior to Admission Medications   Prescriptions Last Dose Informant Patient Reported? Taking?   DICYCLOMINE HCL PO 8/14/2017 at 700 Self Yes Yes   Sig: Take 10-20 mg by mouth 2 times daily as needed    Escitalopram Oxalate (LEXAPRO PO) 8/14/2017 at 700 Self Yes Yes   Sig: Take 10 mg by mouth daily    LORazepam (ATIVAN PO) 8/14/2017 at prn Self Yes Yes   Sig: Take 1 mg by mouth 3 times daily as needed    Multiple Vitamins-Minerals (WOMENS MULTIVITAMIN PLUS PO)   8/14/2017 at 700  Yes Yes   Sig: Take 2 tablets by mouth daily   NAPROXEN PO 8/15/2017 at 700 Self Yes Yes   Sig: Take 250 mg by mouth 3 times daily as needed for moderate   pain   OMEPRAZOLE PO 8/14/2017 at 700 Self Yes Yes   Sig: Take 40 mg by mouth every morning   Ondansetron (ZOFRAN ODT PO) Past Week at prn Self Yes Yes   Sig: Take 4 mg by mouth every 8 hours as needed for nausea   albuterol (PROAIR HFA/PROVENTIL HFA/VENTOLIN HFA) 108 (90 BASE)   MCG/ACT Inhaler Past Month at prn Self Yes Yes   Sig: Inhale 2 puffs into the lungs every 4 hours as needed for   shortness of breath / dyspnea or wheezing   levonorgestrel-ethinyl estradiol (AVIANE,ALESSE,LESSINA) 0.1-20   MG-MCG per tablet 8/14/2017 at 700 Self Yes Yes   Sig: Take 1 tablet by mouth daily      Facility-Administered Medications: None     Allergies   No Known Allergies    Social History   Single  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     Review of Systems    FULL 10pt ROS completed refer to hpi and below  General: Denies fevers, night sweats or chills.   HEENT:nose and gum bleeding  Lymph: Denies any new lumps or bumps.    GI: Denies blood in stool, black tarry appearance to stool.  : Denies pain blood in urine or urine the color of coca-cola.  Skin: Denies rashes or other skin lesions or changes in skin   color.  Heme: Denies bruising.    Physical Exam   Temp: 98.1  F (36.7  C) Temp src: Oral BP: 101/67 Pulse: 91 Heart   Rate: 55 Resp: 16 SpO2: 100 % O2 Device: Nasal cannula Oxygen   Delivery: 1 LPM  Vital Signs with Ranges  Temp:  [97.6  F (36.4  C)-98.7  F (37.1  C)] 98.1  F (36.7  C)  Pulse:  [91] 91  Heart Rate:  [49-87] 55  Resp:  [13-24] 16  BP: (101-127)/(67-97) 101/67  SpO2:  [88 %-100 %] 100 %  119 lbs 0 oz  GEN: comfortable, in no distress  HEENT: atraumatic, sclera anicteric, no oral thursh  Neck: No LAD, no JVD  Chest: no tenderness to palpation  CV: RRR, S1/2 present no mrg, no LE peripheral edema  LUNG: comfortable on room air, CTAB, no wheezing rales or rhonchi  Abdomen: no distension, no tenderness to palpation, no   hepatomegaly  MSK: no gross deformities  SKIN: warm, dry, no rash  NEURO: no gross abnormalities, alert and oriented x3  PSYCH: appropriate affect and mood.  Lymph: no LAD at neck, axilla and groin    Data   -Data reviewed today: All pertinent laboratory and imaging   results from this encounter were reviewed. I personally reviewed   CT Chest showing PE.    Recent " Labs  Lab 08/16/17  0743 08/15/17  2205 08/15/17  1932 08/15/17  1339 08/15/17  1040   WBC 6.2  --  8.2  --  6.4   HGB 11.7  --  11.8  --  12.6   MCV 90  --  90  --  89     --  185  --  187   INR  --  1.14  --   --  Quantity not sufficient     --   --   --  138   POTASSIUM 4.6  --   --   --  4.2   CHLORIDE 109  --   --   --  106   CO2 24  --   --   --  27   BUN 3*  --   --   --  6*   CR 0.65  --   --   --  0.62   ANIONGAP 5  --   --   --  6   STALIN 8.2*  --   --   --  8.4*   GLC 83  --   --   --  94   ALBUMIN  --   --   --   --  3.4   PROTTOTAL  --   --   --   --  7.0   BILITOTAL  --   --   --   --  0.2   ALKPHOS  --   --   --   --  58   ALT  --   --   --   --  28   AST  --   --   --   --  26   TROPI  --   --   --  <0.015  --        Recent Results (from the past 24 hour(s))   XR Chest 2 Views    Narrative    XR CHEST 2 VW  8/15/2017 11:42 AM      HISTORY: left sided chest pain    COMPARISON: 12/7/2010    FINDINGS: PA and lateral views of the chest upright. The  cardiomediastinal silhouette and pulmonary vasculature are stable   and  within normal limits. There is no focal airspace opacity, pleural  effusion, or pneumothorax. The visualized upper abdomen, osseous  structures, and soft tissues are unremarkable.      Impression    IMPRESSION: Clear lungs.     I have personally reviewed the examination and initial   interpretation  and I agree with the findings.    FERMIN HOBBS MD   CT Chest Pulmonary Embolism w Contrast   Result Value    Radiologist flags Bilateral pulmonary emboli (AA)    Addendum: 8/15/2017    Addendum:    Three-dimensional (3D) post-processed angiographic images were  reconstructed, archived to PACS and used in interpretation of   this  study.    ANNETTE HAWKINS MD      Narrative    Exam: Chest CT Pulmonary Angiogram 8/15/2017 12:57 PM..    History: Left lateral chest pain.    Comparison: None.    Technique: Volumetric helical acquisition of the chest was   obtained  following  pulmonary embolism protocol.     Findings:    There is adequate opacification of the pulmonary arteries. There   are  bilateral central pulmonary artery embolisms including the left   lower  lobe lobar pulmonary artery, which is near occlusive, extending   into  lower lobe segmental branches, and extending partially into the   left  upper lobe pulmonary artery. There is additionally a right   pulmonary  artery lobar branch embolism extending into the superior   segmental  branches and lower lobe segmental branches. There is somewhat  increased attenuation of the anterior upper mediastinal fat,   which is  likely non-opacified innominate vessels, or possibly thymic   tissue  anteriorly.    No evidence of right heart strain. No significant mediastinal  lymphadenopathy.    Bilateral wedge-shaped peripheral lower lobe opacities. Bibasilar  subsegmental atelectasis. Sub-4 mm nodular opacities along the   left  major fissure (series 7, image 138).    Limited visualized upper abdomen. Splenule.    No suspicious bony lesions.       Impression    Impression:   1. Extensive bilateral pulmonary emboli. No evidence of right   heart  strain.  2. Bilateral lower lobe peripheral infarcts.  3. Sub-4 mm nodular nodules along the left major fissure may   represent  fissural lymph nodes and do not require any additional routine  follow-up in a low risk patient per Fleischner criteria   guidelines.    [Critical Result: Bilateral pulmonary emboli]    Finding was identified on 8/15/2017 12:51 PM.     Dr. Gutiérrez was contacted by Dr. Zamudio at 8/15/2017 12:52 PM and  verbalized understanding of the critical finding.     I have personally reviewed the examination and initial   interpretation  and I agree with the findings.    ANNETTE HAWKINS MD       ECHO 8/15/17  Interpretation Summary  Global and regional left ventricular function is normal with an   EF of 55-60%.  The right ventricle is normal size. Global right ventricular   function  "is  normal.  No significant valvular abnormalities were noted.  Right ventricular systolic pressure is 18mmHg above the right   atrial pressure  (Pulmonary artery pressure is normal)  The inferior vena cava was normal in size with preserved   respiratory  variability.  No pericardial effusion is present.  Previous study not available for comparison.  __________________________________________________________________  ___________         Gynecologic Oncology Adult IP Consult: Patient to be seen: Routine within 24 hrs; Call back #: [phone]; Extensive BIlateral PEs.; Consultant may enter orders: Yes    Narrative    Claribel Yost MD     8/17/2017  7:41 AM  [facility] History and Physical    María Ortiz MRN# [id number]   Age: 21 year old YOB: 1995     Date of Admission:  8/15/2017    Primary care provider: Lindsay Ley            Reason for consult   Contraceptive recommendations         History of Present Illness:   This patient is a 21 year old G0 with hx of dysmenorrhea/AUB who   presented with chest pain found to have bilateral PEs, now   admitted acute management with anticoagulation and pain control.     She has been on oral contraceptives for the past 10 years due to   severe symptoms of nausea/vomiting, abdominal cramping, and heavy   bleeding around the time of her periods. OCPs have helped these   symptoms improve though she still has them when she takes the   placebo pills and has a withdrawal bleed. Typically would use   about 5 tampons a day for 2-3 days during her \"heavy bleeding\".   Notes some passage of clots.  Because of this she has been   cycling them [id number] for the past few years. In the last 6   months or so she decided to take them continuously to avoid the   symptoms associated with her withdrawal bleeds. She has had   breakthrough bleeding that she says is like a period and these   happen every month or 2 for a few days but the symptoms are   tolerable as " "long as she stays on her pill. Last bleeding was   around the beginning of June. Reports occasionally will get   malodorous milky discharge from vagina. She uses multiple OTC   products to try to prevent yeast.      Of note, the patient herself has no history of blood clots,   though she mentions she has a strong family history - her father   has 2 filters in place for blood clots (\"one in neck and one in   leg\"), her paternal grandmother and 2 other relatives on that   side of the family are also on anticoagulation for blood clots.   There has never been any type of genetic work up that she is   aware of. She states she does not smoke. She has migraines   occasionally but they are not associated with aura.     Receives Ob/gyn care from [facility].     Gyn Hx:  Menses: as above.  Sexual hx: Not sexually active. Has never had vaginal   intercourse.   STIs: denies. Has had yeast infections in the past. Will take   \"azole pills\" regularly to prevent these.   Pap: done last year, normal per patient though not in records.    Ob Hx:   G0            Past Medical History:     Past Medical History:   Diagnosis Date     Anxiety      Depressive disorder      IBS (irritable bowel syndrome)           Past Surgical History:      Past Surgical History:   Procedure Laterality Date     ORTHOPEDIC SURGERY      R shoulder 2012          Social History:     Social History   Substance Use Topics     Smoking status: Never Smoker     Smokeless tobacco: Not on file     Alcohol use Not on file          Family History:     - See HPI         Immunizations:     There is no immunization history on file for this patient.       Allergies:   No Known Allergies       Medications:     Current Facility-Administered Medications   Medication     0.9% sodium chloride infusion     sodium chloride 0.9 % for CT scan flush dose 83 mL     naloxone (NARCAN) injection 0.1-0.4 mg     ketorolac (TORADOL) injection 30 mg     [START ON " 8/16/2017] rivaroxaban ANTICOAGULANT (XARELTO) tablet   15 mg     lidocaine 1 % 1 mL     lidocaine (LMX4) kit     sodium chloride (PF) 0.9% PF flush 3 mL     sodium chloride (PF) 0.9% PF flush 3 mL     escitalopram (LEXAPRO) tablet 10 mg     LORazepam (ATIVAN) tablet 1 mg     omeprazole (priLOSEC) CR capsule 40 mg     HYDROmorphone (PF) (DILAUDID) injection 0.3-0.5 mg     oxyCODONE (ROXICODONE) IR tablet 5-10 mg     diphenhydrAMINE (BENADRYL) capsule 25 mg     ondansetron (ZOFRAN-ODT) ODT tab 4 mg    Or     ondansetron (ZOFRAN) injection 4 mg     prochlorperazine (COMPAZINE) injection 5-10 mg    Or     prochlorperazine (COMPAZINE) tablet 5-10 mg    Or     prochlorperazine (COMPAZINE) Suppository 25 mg          Review of Systems:   Complete 10 point ROS negative except as noted in HPI.        Physical Exam:     Vitals:    08/15/17 1558 08/15/17 1700 08/15/17 2128 08/15/17 2226   BP: (!) 127/97  116/84 108/80   Pulse:       Resp: 20 24 16 16   Temp: 98  F (36.7  C)  97.6  F (36.4  C) 97.6  F (36.4  C)   TempSrc: Oral  Oral Oral   SpO2: 99%  100% 100%   Weight:       Height:         General: alert, cooperative, NAD  CV: Regular rate and rhythm   Resp: clear to auscultation bilaterally. Some pain with deep   inspiration.   Abdomen: soft, non tender, non distended. No masses,   organomegaly.  : NEFG. On speculum exam vagina and cervix were normal. Small   amount of whitish/yellow mucus discharge noted around cervix. Wet   mount collected. On bimanual, uterus small and mobile, no CMT, no   adnexal tenderness or fullness. Bladder full.   Extremities: Warm, well perfused. No rashes, lesions or bilateral   lower extremity edema.          Data:     Results for orders placed or performed during the hospital   encounter of 08/15/17 (from the past 24 hour(s))   EKG 12-lead, tracing only   Result Value Ref Range    Interpretation ECG Click View Image link to view waveform and   result    CBC with platelets differential   Result  Value Ref Range    WBC 6.4 4.0 - 11.0 10e9/L    RBC Count 4.06 3.8 - 5.2 10e12/L    Hemoglobin 12.6 11.7 - 15.7 g/dL    Hematocrit 36.2 35.0 - 47.0 %    MCV 89 78 - 100 fl    MCH 31.0 26.5 - 33.0 pg    MCHC 34.8 31.5 - 36.5 g/dL    RDW 12.3 10.0 - 15.0 %    Platelet Count 187 150 - 450 10e9/L    Diff Method Automated Method     % Neutrophils 67.1 %    % Lymphocytes 19.9 %    % Monocytes 6.1 %    % Eosinophils 6.4 %    % Basophils 0.3 %    % Immature Granulocytes 0.2 %    Nucleated RBCs 0 0 /100    Absolute Neutrophil 4.3 1.6 - 8.3 10e9/L    Absolute Lymphocytes 1.3 0.8 - 5.3 10e9/L    Absolute Monocytes 0.4 0.0 - 1.3 10e9/L    Absolute Eosinophils 0.4 0.0 - 0.7 10e9/L    Absolute Basophils 0.0 0.0 - 0.2 10e9/L    Abs Immature Granulocytes 0.0 0 - 0.4 10e9/L    Absolute Nucleated RBC 0.0    D dimer quantitative   Result Value Ref Range    D Dimer 3.2 (H) 0.0 - 0.50 ug/ml FEU   Basic metabolic panel   Result Value Ref Range    Sodium 138 133 - 144 mmol/L    Potassium 4.2 3.4 - 5.3 mmol/L    Chloride 106 94 - 109 mmol/L    Carbon Dioxide 27 20 - 32 mmol/L    Anion Gap 6 3 - 14 mmol/L    Glucose 94 70 - 99 mg/dL    Urea Nitrogen 6 (L) 7 - 30 mg/dL    Creatinine 0.62 0.52 - 1.04 mg/dL    GFR Estimate >90 >60 mL/min/1.7m2    GFR Estimate If Black >90 >60 mL/min/1.7m2    Calcium 8.4 (L) 8.5 - 10.1 mg/dL   Hepatic panel   Result Value Ref Range    Bilirubin Direct <0.1 0.0 - 0.2 mg/dL    Bilirubin Total 0.2 0.2 - 1.3 mg/dL    Albumin 3.4 3.4 - 5.0 g/dL    Protein Total 7.0 6.8 - 8.8 g/dL    Alkaline Phosphatase 58 40 - 150 U/L    ALT 28 0 - 50 U/L    AST 26 0 - 45 U/L   INR   Result Value Ref Range    INR Quantity not sufficient 0.86 - 1.14   Partial thromboplastin time   Result Value Ref Range    PTT Quantity not sufficient 22 - 37 sec   hCG qual urine POCT   Result Value Ref Range    HCG Qual Urine Negative neg    Internal QC OK Yes    XR Chest 2 Views    Narrative    XR CHEST 2 VW  8/15/2017 11:42 AM      HISTORY: left  sided chest pain    COMPARISON: 12/7/2010    FINDINGS: PA and lateral views of the chest upright. The  cardiomediastinal silhouette and pulmonary vasculature are stable   and  within normal limits. There is no focal airspace opacity, pleural  effusion, or pneumothorax. The visualized upper abdomen, osseous  structures, and soft tissues are unremarkable.      Impression    IMPRESSION: Clear lungs.     I have personally reviewed the examination and initial   interpretation  and I agree with the findings.    FERMIN HOBBS MD   CT Chest Pulmonary Embolism w Contrast   Result Value Ref Range    Radiologist flags Bilateral pulmonary emboli (AA)     Addendum: 8/15/2017    Addendum:    Three-dimensional (3D) post-processed angiographic images were  reconstructed, archived to PACS and used in interpretation of   this  study.    ANNETTE HAWKINS MD      Narrative    Exam: Chest CT Pulmonary Angiogram 8/15/2017 12:57 PM..    History: Left lateral chest pain.    Comparison: None.    Technique: Volumetric helical acquisition of the chest was   obtained  following pulmonary embolism protocol.     Findings:    There is adequate opacification of the pulmonary arteries. There   are  bilateral central pulmonary artery embolisms including the left   lower  lobe lobar pulmonary artery, which is near occlusive, extending   into  lower lobe segmental branches, and extending partially into the   left  upper lobe pulmonary artery. There is additionally a right   pulmonary  artery lobar branch embolism extending into the superior   segmental  branches and lower lobe segmental branches. There is somewhat  increased attenuation of the anterior upper mediastinal fat,   which is  likely non-opacified innominate vessels, or possibly thymic   tissue  anteriorly.    No evidence of right heart strain. No significant mediastinal  lymphadenopathy.    Bilateral wedge-shaped peripheral lower lobe opacities. Bibasilar  subsegmental atelectasis. Sub-4  mm nodular opacities along the   left  major fissure (series 7, image 138).    Limited visualized upper abdomen. Splenule.    No suspicious bony lesions.       Impression    Impression:   1. Extensive bilateral pulmonary emboli. No evidence of right   heart  strain.  2. Bilateral lower lobe peripheral infarcts.  3. Sub-4 mm nodular nodules along the left major fissure may   represent  fissural lymph nodes and do not require any additional routine  follow-up in a low risk patient per Fleischner criteria   guidelines.    [Critical Result: Bilateral pulmonary emboli]    Finding was identified on 8/15/2017 12:51 PM.     Dr. Parra was contacted by Dr. Zamudio at 8/15/2017 12:52 PM and  verbalized understanding of the critical finding.     I have personally reviewed the examination and initial   interpretation  and I agree with the findings.    ANNETTE HAWKINS MD   Troponin I   Result Value Ref Range    Troponin I ES <0.015 0.000 - 0.045 ug/L   ECHO COMPLETE WITH OPTISON    Narrative    [id number]  [id number]  [id number]  [id number]^FIDEL^EDDIE^TRUE           [facility],[place]  Echocardiography Laboratory  [address] [place] [id number]     Name: MORENA PALMER  MRN: [id number]  : 1995  Study Date: 08/15/2017 02:33 PM  Age: 21 yrs  Gender: Female  Patient Location: [facility]  Reason For Study: Pulmonary Embolism  Ordering Physician: EDDIE PARRA  Performed By: Andrea Mares RDCS     BSA: 1.5 m2  Height: 62 in  Weight: 119 lb  BP: 119/87 mmHg  __________________________________________________________________  ___________  __        Procedure  Complete Portable Echo Adult. Contrast Optison. Optison (NDC   #9284-9380-77)  given intravenously. Patient was given 3 ml mixture of 3 ml   Optison and 6 ml  saline. 6 ml wasted.  __________________________________________________________________  ___________  __        Interpretation Summary  Global and regional left ventricular function is  normal with an   EF of 55-60%.  The right ventricle is normal size. Global right ventricular   function is  normal.  No significant valvular abnormalities were noted.  Right ventricular systolic pressure is 18mmHg above the right   atrial pressure  (Pulmonary artery pressure is normal)  The inferior vena cava was normal in size with preserved   respiratory  variability.  No pericardial effusion is present.  Previous study not available for comparison.  __________________________________________________________________  ___________  __        Left Ventricle  Left ventricular size is normal. Left ventricular wall thickness   is normal.  Global and regional left ventricular function is normal with an   EF of 55-60%.  Normal left ventricular filling for age.     Right Ventricle  The right ventricle is normal size. Global right ventricular   function is  normal.     Atria  Both atria appear normal.        Mitral Valve  The mitral valve is normal. Trace mitral insufficiency is   present.     Aortic Valve  Aortic valve is normal in structure and function. The aortic   valve is  tricuspid.     Tricuspid Valve  The tricuspid valve is normal. Trace tricuspid insufficiency is   present. Right  ventricular systolic pressure is 18mmHg above the right atrial   pressure.     Pulmonic Valve  The pulmonic valve is normal.     Vessels  The inferior vena cava was normal in size with preserved   respiratory  variability. The aorta root is normal. Estimated mean right   atrial pressure is  3 mmHg.     Pericardium  No pericardial effusion is present.        Compared to Previous Study  Previous study not available for comparison.  __________________________________________________________________  ___________  __     MMode/2D Measurements & Calculations  IVSd: 0.67 cm  LVIDd: 4.6 cm  LVIDs: 3.2 cm  LVPWd: 0.55 cm  FS: 30.4 %  EDV(Teich): 97.3 ml  ESV(Teich): 41.0 ml  LV mass(C)d: 83.7 grams  LV mass(C)dI: 54.6 grams/m2  Ao root diam: 2.9  cm  LVOT diam: 1.8 cm  LVOT area: 2.5 cm2        Doppler Measurements & Calculations  MV E max nichole: 84.1 cm/sec  MV A max nichole: 54.0 cm/sec  MV E/A: 1.6  MV dec time: 0.16 sec  PA acc time: 0.20 sec  TR max nichole: 209.0 cm/sec  TR max P.5 mmHg  Lateral E/e': 6.3  Medial E/e': 10.1              __________________________________________________________________  ___________  __        Report approved by: Robert Li 08/15/2017 04:17 PM      CBC with platelets differential   Result Value Ref Range    WBC 8.2 4.0 - 11.0 10e9/L    RBC Count 3.83 3.8 - 5.2 10e12/L    Hemoglobin 11.8 11.7 - 15.7 g/dL    Hematocrit 34.5 (L) 35.0 - 47.0 %    MCV 90 78 - 100 fl    MCH 30.8 26.5 - 33.0 pg    MCHC 34.2 31.5 - 36.5 g/dL    RDW 12.3 10.0 - 15.0 %    Platelet Count 185 150 - 450 10e9/L    Diff Method Automated Method     % Neutrophils 56.5 %    % Lymphocytes 29.3 %    % Monocytes 7.9 %    % Eosinophils 6.0 %    % Basophils 0.2 %    % Immature Granulocytes 0.1 %    Nucleated RBCs 0 0 /100    Absolute Neutrophil 4.6 1.6 - 8.3 10e9/L    Absolute Lymphocytes 2.4 0.8 - 5.3 10e9/L    Absolute Monocytes 0.7 0.0 - 1.3 10e9/L    Absolute Eosinophils 0.5 0.0 - 0.7 10e9/L    Absolute Basophils 0.0 0.0 - 0.2 10e9/L    Abs Immature Granulocytes 0.0 0 - 0.4 10e9/L    Absolute Nucleated RBC 0.0    Reticulocyte count   Result Value Ref Range    % Retic 1.0 0.5 - 2.0 %    Absolute Retic 37.2 25 - 95 10e9/L   INR   Result Value Ref Range    INR 1.14 0.86 - 1.14   Partial thromboplastin time   Result Value Ref Range    PTT 40 (H) 22 - 37 sec             Assessment and Plan:   Assessment: 21 year old with hx of dysmenorrhea and heavy   bleeding menstrual bleeding controlled by use of continuous   estrogen containing oral contraceptive pills who presents with   bilateral pulmonary emboli, which is being managed by hematology.   This thromboembolic event precludes use of estrogen containing   contraceptives in the future. Though the patient is  not currently   sexually active, it is important she does not become pregnant   until this clot is treated as this would cause a more   hypercoagulable state. It would also be beneficial for the   patient to be on a type of contraception that would improve the   amount of bleeding and symptoms during her periods, thus we would   recommend some type of progesterone only form of contraception.     Discussed pills, depo, nexplanon, and levonorgestrel IUD and the   patient is undecided, but leaning toward an IUD.  This can be   placed either inpatient before discharge or she may see her   primary Ob/Gyn provider after discharge to schedule an   appointment for IUD placement if she prefers.    She will likely have a withdrawal bleed in the next day or two as   OCPs are being held. Given history of relatively heavy menstrual   bleeding and current anticoagulation, the patient could   potentially have a heavy withdrawal bleed. Her hemoglobin is   normal currently. Please notify us if the patient has ongoing   heavy bleeding (ie fully saturates more than 2 pads in 2 hours)   in case an acute intervention is needed to decrease her bleeding.       Recommendations:  - progesterone only contraception, likely IUD. Either inpatient   or outpatient is fine (we will follow up with patient to see what   she prefers).   - Agree with full hematologic workup, especially in the setting   of her strong family history   - please notify Gyn team if patient begins to have worrisome   bleeding of >2 pads/hour for >2 hours. Bleeding in the next few   days is to be expected and would not be cause for concern given   recent cessation of OCPs.   - can manage menstrual symptoms with scheduled NSAIDs and prn   anti-emetics, which the patient already has ordered.   - will follow up wet mount that was collected for abnormal   discharge and treat as needed.      Thank you for the opportunity to participate in this patient's   care.     Discussed with  staff Dr. Yost.  Celia Castanon MD  OB/GYN Resident PGY-2  Gyn Onc Team Pager [phone] (Daytime GYN pager [phone]).   8/15/2017 7:29 PM       Appreciate Dr. Castanon's note above, patient's history and   physical exam findings reviewed by me. I agree with the   recommendations above.   Claribel Yost MD     hCG qual urine POCT   Result Value Ref Range    HCG Qual Urine Negative neg    Internal QC OK Yes    ECHO COMPLETE WITH OPTISON    Narrative    [id number]  [id number]  [id number]  [id number]^FIDEL^EDDIE^TRUE           [facility],[place]  Echocardiography Laboratory  [address] [place] [address]     Name: MORENA PALMER  MRN: [id number]  : 1995  Study Date: 08/15/2017 02:33 PM  Age: 21 yrs  Gender: Female  Patient Location: [facility]  Reason For Study: Pulmonary Embolism  Ordering Physician: EDDIE PARRA  Performed By: Andrea Mares RDCS     BSA: 1.5 m2  Height: 62 in  Weight: 119 lb  BP: 119/87 mmHg  _____________________________________________________________________________  __        Procedure  Complete Portable Echo Adult. Contrast Optison. Optison (NDC #8639-1545-06)  given intravenously. Patient was given 3 ml mixture of 3 ml Optison and 6 ml  saline. 6 ml wasted.  _____________________________________________________________________________  __        Interpretation Summary  Global and regional left ventricular function is normal with an EF of 55-60%.  The right ventricle is normal size. Global right ventricular function is  normal.  No significant valvular abnormalities were noted.  Right ventricular systolic pressure is 18mmHg above the right atrial pressure  (Pulmonary artery pressure is normal)  The inferior vena cava was normal in size with preserved respiratory  variability.  No pericardial effusion is present.  Previous study not available for comparison.  _____________________________________________________________________________  __        Left  Ventricle  Left ventricular size is normal. Left ventricular wall thickness is normal.  Global and regional left ventricular function is normal with an EF of 55-60%.  Normal left ventricular filling for age.     Right Ventricle  The right ventricle is normal size. Global right ventricular function is  normal.     Atria  Both atria appear normal.        Mitral Valve  The mitral valve is normal. Trace mitral insufficiency is present.     Aortic Valve  Aortic valve is normal in structure and function. The aortic valve is  tricuspid.     Tricuspid Valve  The tricuspid valve is normal. Trace tricuspid insufficiency is present. Right  ventricular systolic pressure is 18mmHg above the right atrial pressure.     Pulmonic Valve  The pulmonic valve is normal.     Vessels  The inferior vena cava was normal in size with preserved respiratory  variability. The aorta root is normal. Estimated mean right atrial pressure is  3 mmHg.     Pericardium  No pericardial effusion is present.        Compared to Previous Study  Previous study not available for comparison.  _____________________________________________________________________________  __     MMode/2D Measurements & Calculations  IVSd: 0.67 cm  LVIDd: 4.6 cm  LVIDs: 3.2 cm  LVPWd: 0.55 cm  FS: 30.4 %  EDV(Teich): 97.3 ml  ESV(Teich): 41.0 ml  LV mass(C)d: 83.7 grams  LV mass(C)dI: 54.6 grams/m2  Ao root diam: 2.9 cm  LVOT diam: 1.8 cm  LVOT area: 2.5 cm2        Doppler Measurements & Calculations  MV E max nichole: 84.1 cm/sec  MV A max nichole: 54.0 cm/sec  MV E/A: 1.6  MV dec time: 0.16 sec  PA acc time: 0.20 sec  TR max nichole: 209.0 cm/sec  TR max P.5 mmHg  Lateral E/e': 6.3  Medial E/e': 10.1              _____________________________________________________________________________  __        Report approved by: Robert Li 08/15/2017 04:17 PM      Wet prep   Result Value Ref Range    Specimen Description Genital     Wet Prep No Trichomonas seen     Wet Prep No clue  cells seen     Wet Prep No yeast seen     Wet Prep Many  WBC'S seen          Signed:  LIZANDRO Dooley, CNP  August 17, 2017 at 8:23 AM

## 2017-08-17 NOTE — PROGRESS NOTES
"CLINICAL NUTRITION SERVICES - ASSESSMENT NOTE     Nutrition Prescription    Malnutrition Status:    Non-severe malnutrition in the context of acute illness    Recommendations already ordered by Registered Dietitian (RD):  Snacks ordered at 10 AM, 2 PM, and HS snack time per patient's requests  Trial of Ensure Clear    Future/Additional Recommendations:  Continue home MVI with minerals     REASON FOR ASSESSMENT  María Ortiz is a/an 21 year old female assessed by the dietitian for Admission Nutrition Risk Screen for unintentional loss of 10# or more in the past two months    NUTRITION HISTORY  Patient reports having to follow a bland diet d/t \"GI issues\" over the last 6 months.  She has been experiencing nausea/vomiting, reflux randomly.  She typically snacks frequently throughout the day in addition to having 3 meals/day.  She works out frequently.  She is lactose intolerant.    CURRENT NUTRITION ORDERS  Diet: Regular  Intake/Tolerance: She reports eating well while here.     LABS  Labs reviewed    MEDICATIONS  Medications reviewed    ANTHROPOMETRICS  Height: 157.5 cm (5' 2\")  Most Recent Weight: 54 kg (119 lb)    IBW: 50 kg  BMI: Normal BMI  Weight History: She reports losing ~8-9# (6%) over the last 3.5 weeks.   Dosing Weight: 54 kg (actual)    ASSESSED NUTRITION NEEDS  Estimated Energy Needs: 7450-2105 kcals/day (25 - 30 kcals/kg)  Justification: Maintenance  Estimated Protein Needs: 43-65 grams protein/day (0.8 - 1.2 grams of pro/kg)  Justification: Maintenance-Acute illness  Estimated Fluid Needs: 1 mL/kcal  Justification: Maintenance    PHYSICAL FINDINGS  See malnutrition section below.    MALNUTRITION  % Intake: < 75% for > 7 days (non-severe)  % Weight Loss: > 5% in 1 month (severe)  Subcutaneous Fat Loss: None observed  Muscle Loss: Upper arm (bicep, tricep):  mild and Upper leg (quadricep, hamstring): mild (per patient report)  Fluid Accumulation/Edema: None noted  Malnutrition Diagnosis: Non-severe " malnutrition in the context of acute illness    NUTRITION DIAGNOSIS  Unintended weight loss related to bland diet, eating less/vomiting as evidenced by 6% wt loss in the last 3.5 weeks.    INTERVENTIONS  Implementation  Nutrition Education: Provided education on RD role, snack/supplement options.   Modify composition of meals/snacks - discussed and set up snack schedule, trial of Ensure Clear    Goals  Weight maintenance at 54 kg   Patient to consume % of nutritionally adequate meal trays TID, or the equivalent with supplements/snacks.     Monitoring/Evaluation  Progress toward goals will be monitored and evaluated per protocol.    Keri Durbin MS, RD, LD  Pager [phone]

## 2017-08-17 NOTE — PLAN OF CARE
Problem: Discharge Planning  Goal: Discharge Planning (Adult, OB, Behavioral, Peds)  Outpatient/Observation goals to be met before discharge home:     - Stable Cardiopulmonary status: pt reports increased pain near left ribcage   - Vital signs stable: yes   - Anticoagulation initiated and able to administer upon discharge: yes

## 2017-08-17 NOTE — PLAN OF CARE
Problem: Goal Outcome Summary  Goal: Goal Outcome Summary  Outpatient/Observation goals to be met before discharge home:     PRIMARY DIAGNOSIS: PEs  OUTPATIENT/OBSERVATION GOALS TO BE MET BEFORE DISCHARGE:  Stable cardiopulmonary status: YES  Vital signs stable: YES  Anticoagulation initiated and able to administer upon discharge: YES  Follow up for bleeding and clotting disorders set up: YES  *Nurse to notify MD when observation goals have been met and patient is ready for discharge.

## 2017-08-17 NOTE — PLAN OF CARE
Problem: Discharge Planning  Goal: Discharge Planning (Adult, OB, Behavioral, Peds)  Outpatient/Observation goals to be met before discharge home:        - Stable Cardiopulmonary status: yes  - Vital signs stable: yes   - Anticoagulation initiated and able to administer upon discharge: yes

## 2017-08-18 ENCOUNTER — APPOINTMENT (OUTPATIENT)
Dept: GENERAL RADIOLOGY | Facility: CLINIC | Age: 22
DRG: 176 | End: 2017-08-18
Payer: COMMERCIAL

## 2017-08-18 LAB
APCR PPP: 2.78 {RATIO}
AT III ACT/NOR PPP CHRO: 83 % (ref 85–135)
ERYTHROCYTE [DISTWIDTH] IN BLOOD BY AUTOMATED COUNT: 12.2 % (ref 10–15)
HCT VFR BLD AUTO: 40.2 % (ref 35–47)
HGB BLD-MCNC: 14.4 G/DL (ref 11.7–15.7)
INTERPRETATION ECG - MUSE: NORMAL
MCH RBC QN AUTO: 31.4 PG (ref 26.5–33)
MCHC RBC AUTO-ENTMCNC: 35.8 G/DL (ref 31.5–36.5)
MCV RBC AUTO: 88 FL (ref 78–100)
PLATELET # BLD AUTO: 243 10E9/L (ref 150–450)
RBC # BLD AUTO: 4.58 10E12/L (ref 3.8–5.2)
TROPONIN I SERPL-MCNC: <0.015 UG/L (ref 0–0.04)
WBC # BLD AUTO: 6.6 10E9/L (ref 4–11)

## 2017-08-18 PROCEDURE — 12000008 ZZH R&B INTERMEDIATE UMMC

## 2017-08-18 PROCEDURE — 71010 XR CHEST PORT 1 VW: CPT

## 2017-08-18 PROCEDURE — 99233 SBSQ HOSP IP/OBS HIGH 50: CPT | Mod: GC | Performed by: INTERNAL MEDICINE

## 2017-08-18 PROCEDURE — 36415 COLL VENOUS BLD VENIPUNCTURE: CPT | Performed by: STUDENT IN AN ORGANIZED HEALTH CARE EDUCATION/TRAINING PROGRAM

## 2017-08-18 PROCEDURE — 25000132 ZZH RX MED GY IP 250 OP 250 PS 637: Performed by: STUDENT IN AN ORGANIZED HEALTH CARE EDUCATION/TRAINING PROGRAM

## 2017-08-18 PROCEDURE — 93005 ELECTROCARDIOGRAM TRACING: CPT

## 2017-08-18 PROCEDURE — 25000132 ZZH RX MED GY IP 250 OP 250 PS 637: Performed by: NURSE PRACTITIONER

## 2017-08-18 PROCEDURE — 85027 COMPLETE CBC AUTOMATED: CPT | Performed by: STUDENT IN AN ORGANIZED HEALTH CARE EDUCATION/TRAINING PROGRAM

## 2017-08-18 PROCEDURE — 84484 ASSAY OF TROPONIN QUANT: CPT | Performed by: STUDENT IN AN ORGANIZED HEALTH CARE EDUCATION/TRAINING PROGRAM

## 2017-08-18 PROCEDURE — 93010 ELECTROCARDIOGRAM REPORT: CPT | Performed by: INTERNAL MEDICINE

## 2017-08-18 RX ORDER — ESCITALOPRAM OXALATE 20 MG/1
20 TABLET ORAL DAILY
Status: DISCONTINUED | OUTPATIENT
Start: 2017-08-19 | End: 2017-08-19 | Stop reason: HOSPADM

## 2017-08-18 RX ORDER — HYDROXYZINE HYDROCHLORIDE 25 MG/1
25-50 TABLET, FILM COATED ORAL 4 TIMES DAILY PRN
Status: DISCONTINUED | OUTPATIENT
Start: 2017-08-18 | End: 2017-08-19 | Stop reason: HOSPADM

## 2017-08-18 RX ORDER — LIDOCAINE 50 MG/G
1 PATCH TOPICAL
Status: DISCONTINUED | OUTPATIENT
Start: 2017-08-18 | End: 2017-08-19 | Stop reason: HOSPADM

## 2017-08-18 RX ORDER — CYCLOBENZAPRINE HCL 10 MG
10 TABLET ORAL 3 TIMES DAILY PRN
Status: DISCONTINUED | OUTPATIENT
Start: 2017-08-18 | End: 2017-08-19 | Stop reason: HOSPADM

## 2017-08-18 RX ORDER — POLYETHYLENE GLYCOL 3350 17 G/17G
17 POWDER, FOR SOLUTION ORAL PRN
Qty: 7 PACKET | Refills: 0 | Status: ON HOLD | OUTPATIENT
Start: 2017-08-18 | End: 2018-01-06

## 2017-08-18 RX ORDER — ACETAMINOPHEN 500 MG
1000 TABLET ORAL 3 TIMES DAILY
Qty: 30 TABLET | Refills: 0 | Status: ON HOLD | OUTPATIENT
Start: 2017-08-18 | End: 2018-01-06

## 2017-08-18 RX ORDER — CAPSAICIN 0.025 %
CREAM (GRAM) TOPICAL
Qty: 45 G | Refills: 0 | Status: SHIPPED | OUTPATIENT
Start: 2017-08-18 | End: 2017-08-19

## 2017-08-18 RX ORDER — LIDOCAINE 50 MG/G
1 PATCH TOPICAL EVERY 24 HOURS
Qty: 15 PATCH | Refills: 0 | Status: SHIPPED | OUTPATIENT
Start: 2017-08-18 | End: 2017-08-19

## 2017-08-18 RX ORDER — AMOXICILLIN 250 MG
1 CAPSULE ORAL 2 TIMES DAILY
Status: DISCONTINUED | OUTPATIENT
Start: 2017-08-18 | End: 2017-08-19 | Stop reason: HOSPADM

## 2017-08-18 RX ORDER — AMOXICILLIN 250 MG
1 CAPSULE ORAL 2 TIMES DAILY PRN
Qty: 10 TABLET | Refills: 0 | Status: ON HOLD | OUTPATIENT
Start: 2017-08-18 | End: 2018-01-17

## 2017-08-18 RX ORDER — LORAZEPAM 0.5 MG/1
0.5 TABLET ORAL ONCE
Status: COMPLETED | OUTPATIENT
Start: 2017-08-18 | End: 2017-08-18

## 2017-08-18 RX ORDER — IBUPROFEN 400 MG/1
400 TABLET, FILM COATED ORAL EVERY 6 HOURS PRN
Qty: 30 TABLET | Refills: 0 | Status: SHIPPED | OUTPATIENT
Start: 2017-08-18 | End: 2017-08-19

## 2017-08-18 RX ORDER — ESCITALOPRAM OXALATE 10 MG/1
20 TABLET ORAL DAILY
Qty: 30 TABLET | Refills: 0 | Status: SHIPPED | OUTPATIENT
Start: 2017-08-18 | End: 2017-08-19

## 2017-08-18 RX ORDER — CAPSAICIN 0.025 %
CREAM (GRAM) TOPICAL
Status: DISCONTINUED | OUTPATIENT
Start: 2017-08-18 | End: 2017-08-19 | Stop reason: HOSPADM

## 2017-08-18 RX ORDER — OXYCODONE HYDROCHLORIDE 5 MG/1
5-10 TABLET ORAL
Status: DISCONTINUED | OUTPATIENT
Start: 2017-08-18 | End: 2017-08-19 | Stop reason: HOSPADM

## 2017-08-18 RX ORDER — ESCITALOPRAM OXALATE 10 MG/1
10 TABLET ORAL ONCE
Status: COMPLETED | OUTPATIENT
Start: 2017-08-18 | End: 2017-08-18

## 2017-08-18 RX ORDER — HYDROXYZINE HYDROCHLORIDE 10 MG/1
10 TABLET, FILM COATED ORAL 3 TIMES DAILY PRN
Qty: 30 TABLET | Refills: 0 | Status: SHIPPED | OUTPATIENT
Start: 2017-08-18 | End: 2017-08-19

## 2017-08-18 RX ORDER — POLYETHYLENE GLYCOL 3350 17 G/17G
17 POWDER, FOR SOLUTION ORAL 2 TIMES DAILY
Status: DISCONTINUED | OUTPATIENT
Start: 2017-08-18 | End: 2017-08-19 | Stop reason: HOSPADM

## 2017-08-18 RX ADMIN — IBUPROFEN 400 MG: 400 TABLET ORAL at 16:55

## 2017-08-18 RX ADMIN — HYDROXYZINE HYDROCHLORIDE 10 MG: 10 TABLET ORAL at 17:15

## 2017-08-18 RX ADMIN — CYCLOBENZAPRINE HYDROCHLORIDE 10 MG: 10 TABLET, FILM COATED ORAL at 17:49

## 2017-08-18 RX ADMIN — DOCUSATE SODIUM 100 MG: 100 CAPSULE, LIQUID FILLED ORAL at 19:00

## 2017-08-18 RX ADMIN — LIDOCAINE 1 PATCH: 50 PATCH CUTANEOUS at 11:52

## 2017-08-18 RX ADMIN — ACETAMINOPHEN AND CODEINE PHOSPHATE 0.35 MG: 120; 12 SOLUTION ORAL at 16:57

## 2017-08-18 RX ADMIN — CAPSAICIN: 0.25 CREAM TOPICAL at 11:52

## 2017-08-18 RX ADMIN — ESCITALOPRAM OXALATE 10 MG: 10 TABLET ORAL at 17:49

## 2017-08-18 RX ADMIN — ACETAMINOPHEN 1000 MG: 500 TABLET, FILM COATED ORAL at 13:52

## 2017-08-18 RX ADMIN — ACETAMINOPHEN 1000 MG: 500 TABLET, FILM COATED ORAL at 20:06

## 2017-08-18 RX ADMIN — OXYCODONE HYDROCHLORIDE 5 MG: 5 TABLET ORAL at 20:50

## 2017-08-18 RX ADMIN — LORAZEPAM 0.5 MG: 0.5 TABLET ORAL at 01:26

## 2017-08-18 RX ADMIN — ESCITALOPRAM OXALATE 10 MG: 10 TABLET ORAL at 08:45

## 2017-08-18 RX ADMIN — OXYCODONE HYDROCHLORIDE 10 MG: 5 TABLET ORAL at 09:04

## 2017-08-18 RX ADMIN — RIVAROXABAN 15 MG: 15 TABLET, FILM COATED ORAL at 08:45

## 2017-08-18 RX ADMIN — POLYETHYLENE GLYCOL 3350 17 G: 17 POWDER, FOR SOLUTION ORAL at 19:01

## 2017-08-18 RX ADMIN — OXYCODONE HYDROCHLORIDE 10 MG: 5 TABLET ORAL at 04:00

## 2017-08-18 RX ADMIN — OMEPRAZOLE 40 MG: 20 CAPSULE, DELAYED RELEASE ORAL at 08:46

## 2017-08-18 RX ADMIN — OXYCODONE HYDROCHLORIDE 5 MG: 5 TABLET ORAL at 00:14

## 2017-08-18 RX ADMIN — HYDROXYZINE HYDROCHLORIDE 10 MG: 10 TABLET ORAL at 13:56

## 2017-08-18 RX ADMIN — SENNOSIDES AND DOCUSATE SODIUM 1 TABLET: 8.6; 5 TABLET ORAL at 08:46

## 2017-08-18 RX ADMIN — IBUPROFEN 400 MG: 400 TABLET ORAL at 22:22

## 2017-08-18 RX ADMIN — SENNOSIDES AND DOCUSATE SODIUM 1 TABLET: 8.6; 5 TABLET ORAL at 20:06

## 2017-08-18 RX ADMIN — RIVAROXABAN 15 MG: 15 TABLET, FILM COATED ORAL at 19:01

## 2017-08-18 RX ADMIN — POLYETHYLENE GLYCOL 3350 17 G: 17 POWDER, FOR SOLUTION ORAL at 08:45

## 2017-08-18 RX ADMIN — ACETAMINOPHEN 1000 MG: 500 TABLET, FILM COATED ORAL at 08:46

## 2017-08-18 RX ADMIN — OXYCODONE HYDROCHLORIDE 10 MG: 5 TABLET ORAL at 17:22

## 2017-08-18 RX ADMIN — OXYCODONE HYDROCHLORIDE 10 MG: 5 TABLET ORAL at 13:52

## 2017-08-18 ASSESSMENT — PAIN DESCRIPTION - DESCRIPTORS
DESCRIPTORS: ACHING;SORE
DESCRIPTORS: ACHING;THROBBING
DESCRIPTORS: THROBBING

## 2017-08-18 NOTE — PLAN OF CARE
Transfer from , arrived to unit at 1930.  Alert and oriented. VSS on RA. New onset diminished lung sounds on RUL, RML, RLL. MD aware.  Patient reports L chest pain. Pain managed oxycodone q4, Ibuprofen q6.  L PIV saline locked. Regular diet, tolerating well. Last BM 8/14, patient starting to pass gas. Patient took miralax this AM, declined additional meds.  Voiding well on own. Ambulates independently.  Gave hydroxyzine x1 for anxiety. Patient's home birth control pills labeled and in pt bin in med room.  Plan to discharge 1-2 days when pain is controlled.    Call light within reach, continue with plan of care.

## 2017-08-18 NOTE — PROGRESS NOTES
[facility], [place]    Internal Medicine Progress Note - [facility]    Main Plans for Today   -increase escitalopram to by 10mg  -start capsaicin cream and lidocaine patch        Assessment & Plan     María Ortiz is a 21 year old women with a PMH significant for costochondritis, depression, GERD, and IBD who presented with rib pain, pleuritic chest pain found to have bilateral pulmonary emboli and now on anticoagulation.    1.Bilateral PE- possibly unprovoked      Pleuritic Chest Pain   Presented with pleuritic chest pain and rib pain. D-dimer was elevated. CT PE positive for bilateral PE. Patient had echo without evidence of right heart strain. Started on lovenox and then started on rivaroxoban. Continues to have pain. Will add on topical agents today.   -  Xarelto 15 mg BID x 21 days then 20 mg daily with food.  - oxycodone 5-10mg [id number]  - tylenol 1000mg TID scheduled  - capsaicin cream   - lidocaine patch   - Follow up with hematology as an outpatient in 6 months       2. Dysmenorrhea - was on Levonorgestrel-ethinyl estradiol for birth control. In the setting of PE,  OB GYN  Was consulted for recommendations to switch birth control. She was started on an progesterone only pill and will follow up as an outpatient to have IUD placed   - micronor   -IUD as outpatient       3. IBS  - On bentyl      4. GERD  Currently takes Zofran, Prilosec, and dicyclomine      5. Depression/Anxiety   Has been on escitalopram for 3 weeks. Discussed with patient possible that her anxiety may be contributing to her perception of pain. She agreed to increase dose to 20mg today. Discontinued ativan and patient has PRN atarax available.     Diet: Regular Diet Adult  Diet  Snacks/Supplements Adult: Other - Please comment; 10 AM - please send fruit loops with soy milk, 2 PM - please send banana bread + apple Ensure Clear, 8 PM - please send PB+J sandwich with fruit cup; Between Meals  Room  Service  Fluids: none   DVT Prophylaxis: on rivaroxaban   Code Status: Full Code    Disposition Plan   Expected discharge: 1-2 days, recommended to home when pain control is achieved.     Information in the above section will display in the discharge planner report.      The patient's care was discussed with the Attending Physician, Dr. Weathers.    Rose Villalba MD  PGY2 [facility]  Pager: [phone]  Please see sticky note for cross cover information    Interval History   -had an episode of pain overnight   -denies shortness of breath, but reports difficulty breathing through her nose  -she denies fever and chills  -no nausea, has not had a bowel movement.     -constitution: no fever  -PULM: pleuritic pain  -ABd: no nausea or abdominal pain   -Psych: anxious       Physical Exam   Vital Signs: Temp: 98.6  F (37  C) Temp src: Oral BP: 148/87 Pulse: 65 Heart Rate: 84 Resp: 16 SpO2: 97 % O2 Device: None (Room air) Oxygen Delivery: 2 LPM (placed for comfort)  Weight: 128 lbs 8.45 oz  General Appearance: NAD  Respiratory: clear lungs,   Cardiovascular: r/r/r, no murmurs, rubs or gallops   GI: soft, non-tender, bowel sounds appreciated   Skin: no lesions   Other: No lower extremity edema       Data   Data     Recent Labs  Lab 08/18/17  0127 08/18/17  0125 08/16/17  0743 08/15/17  2205 08/15/17  1932 08/15/17  1339 08/15/17  1040   WBC  --  6.6 6.2  --  8.2  --  6.4   HGB  --  14.4 11.7  --  11.8  --  12.6   MCV  --  88 90  --  90  --  89   PLT  --  243 176  --  185  --  187   INR  --   --   --  1.14  --   --  Quantity not sufficient   NA  --   --  138  --   --   --  138   POTASSIUM  --   --  4.6  --   --   --  4.2   CHLORIDE  --   --  109  --   --   --  106   CO2  --   --  24  --   --   --  27   BUN  --   --  3*  --   --   --  6*   CR  --   --  0.65  --   --   --  0.62   ANIONGAP  --   --  5  --   --   --  6   STALIN  --   --  8.2*  --   --   --  8.4*   GLC  --   --  83  --   --   --  94    ALBUMIN  --   --   --   --   --   --  3.4   PROTTOTAL  --   --   --   --   --   --  7.0   BILITOTAL  --   --   --   --   --   --  0.2   ALKPHOS  --   --   --   --   --   --  58   ALT  --   --   --   --   --   --  28   AST  --   --   --   --   --   --  26   TROPI <0.015  --   --   --   --  <0.015  --

## 2017-08-18 NOTE — PROGRESS NOTES
VSS.  A/O.  LS clear.  RR wnl.  o2 sats greater than 95% .  IS done with encouragement.   Pain control adequate. Pt c/o pain L rib radiating to back. Tolerating po.  Pt c/o constipation.  Last BM 8/15.  BS audible.  Passing gas.  Up independently in halls.

## 2017-08-18 NOTE — PLAN OF CARE
AOx4.  VSS.  Placed on 2LPM via NC for comfort.  Pt reported severe L-sided chest pain and increased SOB and dyspnea- provider notified and pt seen.  STAT EKG, ECHO, and troponin ordered- results insignificant.  Once PO ativan given.  PRN Oxycodone q4 for pain- partially effective.  See sticky note- pt reported that Toradol is more effective than oxy.  Pt up ad marielos and steady on feet- reminded to call if feeling lightheaded or unsteady.  Voiding adequately.  Pt able to make needs known.  Continue per POC.

## 2017-08-18 NOTE — PROVIDER NOTIFICATION
Pt in severe pain- having difficulty breathing.  O2 sats stable. HR elevated. Would you be able to come see her? [phone] paged.

## 2017-08-18 NOTE — PLAN OF CARE
Problem: Goal Outcome Summary  Goal: Goal Outcome Summary  Outcome: No Change  D/I/A: Patient A & O X 4, VSS sats 98% RA. C/o left chest/ rib side pain with coughing and movement . Received scheduled pain meds and oxycodone 10 mg po x 2, PRN. Applied Zostrix cream , to pain site and applied Lidoderm , patch. Also given Atarax 10 po x 1 for anxiety .  Patient with good appetite no c/o nausea . Up walking in hallway with SBA and did ok. Void adequate , no bowel movement yet, but passing gas. Continue encourage with activity as tolerated and update MD with concerns.

## 2017-08-18 NOTE — PLAN OF CARE
Problem: Goal Outcome Summary  Goal: Goal Outcome Summary  Outcome: No Change  Patient continue C/o left chest/ rib side pain with coughing and movement . Stated pain meds did not helped , given , Ibuprofen , Oxycodone 10 mg,  Atarax, PRN. Applied cold PK, Post assessment with some relief. Primary team come to room to assess patient , and put order for  Flexeril for muscle relax . Patient received Flexeril and Lexaprol . Post assessment . Patient sleeping and stated pain is much better . Continue monitor closely and update MD with change.

## 2017-08-19 VITALS
TEMPERATURE: 97.8 F | HEIGHT: 62 IN | OXYGEN SATURATION: 94 % | SYSTOLIC BLOOD PRESSURE: 113 MMHG | DIASTOLIC BLOOD PRESSURE: 63 MMHG | BODY MASS INDEX: 22.39 KG/M2 | WEIGHT: 121.7 LBS | HEART RATE: 58 BPM | RESPIRATION RATE: 12 BRPM

## 2017-08-19 PROCEDURE — 99238 HOSP IP/OBS DSCHRG MGMT 30/<: CPT | Mod: GC | Performed by: INTERNAL MEDICINE

## 2017-08-19 PROCEDURE — 25000132 ZZH RX MED GY IP 250 OP 250 PS 637: Performed by: STUDENT IN AN ORGANIZED HEALTH CARE EDUCATION/TRAINING PROGRAM

## 2017-08-19 PROCEDURE — 25000132 ZZH RX MED GY IP 250 OP 250 PS 637: Performed by: NURSE PRACTITIONER

## 2017-08-19 RX ORDER — HYDROXYZINE HYDROCHLORIDE 25 MG/1
25 TABLET, FILM COATED ORAL EVERY 4 HOURS PRN
Qty: 30 TABLET | Refills: 0 | Status: ON HOLD | OUTPATIENT
Start: 2017-08-19 | End: 2018-01-09

## 2017-08-19 RX ORDER — ACETAMINOPHEN AND CODEINE PHOSPHATE 120; 12 MG/5ML; MG/5ML
1 SOLUTION ORAL DAILY
Qty: 28 TABLET | Refills: 1 | Status: SHIPPED | OUTPATIENT
Start: 2017-08-19

## 2017-08-19 RX ORDER — ACETAMINOPHEN AND CODEINE PHOSPHATE 120; 12 MG/5ML; MG/5ML
1 SOLUTION ORAL DAILY
Qty: 28 TABLET | Refills: 1 | Status: SHIPPED | OUTPATIENT
Start: 2017-08-19 | End: 2017-08-19

## 2017-08-19 RX ORDER — ESCITALOPRAM OXALATE 20 MG/1
20 TABLET ORAL DAILY
Qty: 30 TABLET | Refills: 0 | Status: SHIPPED | OUTPATIENT
Start: 2017-08-19

## 2017-08-19 RX ORDER — OXYCODONE HYDROCHLORIDE 5 MG/1
5 TABLET ORAL
Qty: 26 TABLET | Refills: 0 | Status: ON HOLD | OUTPATIENT
Start: 2017-08-19 | End: 2018-01-06

## 2017-08-19 RX ORDER — CYCLOBENZAPRINE HCL 10 MG
10 TABLET ORAL 3 TIMES DAILY PRN
Qty: 15 TABLET | Refills: 0 | Status: SHIPPED | OUTPATIENT
Start: 2017-08-19 | End: 2017-08-19

## 2017-08-19 RX ORDER — HYDROXYZINE HYDROCHLORIDE 25 MG/1
25 TABLET, FILM COATED ORAL EVERY 4 HOURS PRN
Qty: 30 TABLET | Refills: 0 | Status: SHIPPED | OUTPATIENT
Start: 2017-08-19 | End: 2017-08-19

## 2017-08-19 RX ORDER — CYCLOBENZAPRINE HCL 10 MG
10 TABLET ORAL 3 TIMES DAILY PRN
Qty: 15 TABLET | Refills: 0 | Status: ON HOLD | OUTPATIENT
Start: 2017-08-19 | End: 2018-01-06

## 2017-08-19 RX ADMIN — DICLOFENAC SODIUM 2 G: 10 GEL TOPICAL at 07:49

## 2017-08-19 RX ADMIN — ESCITALOPRAM OXALATE 20 MG: 20 TABLET ORAL at 07:47

## 2017-08-19 RX ADMIN — OXYCODONE HYDROCHLORIDE 5 MG: 5 TABLET ORAL at 03:11

## 2017-08-19 RX ADMIN — RIVAROXABAN 15 MG: 15 TABLET, FILM COATED ORAL at 07:48

## 2017-08-19 RX ADMIN — OMEPRAZOLE 40 MG: 20 CAPSULE, DELAYED RELEASE ORAL at 07:47

## 2017-08-19 RX ADMIN — SENNOSIDES AND DOCUSATE SODIUM 1 TABLET: 8.6; 5 TABLET ORAL at 07:47

## 2017-08-19 RX ADMIN — HYDROXYZINE HYDROCHLORIDE 25 MG: 25 TABLET ORAL at 07:46

## 2017-08-19 RX ADMIN — HYDROXYZINE HYDROCHLORIDE 25 MG: 25 TABLET ORAL at 03:11

## 2017-08-19 RX ADMIN — OXYCODONE HYDROCHLORIDE 5 MG: 5 TABLET ORAL at 00:09

## 2017-08-19 RX ADMIN — LIDOCAINE 1 PATCH: 50 PATCH CUTANEOUS at 08:02

## 2017-08-19 RX ADMIN — IBUPROFEN 400 MG: 400 TABLET ORAL at 06:37

## 2017-08-19 RX ADMIN — OXYCODONE HYDROCHLORIDE 10 MG: 5 TABLET ORAL at 11:48

## 2017-08-19 RX ADMIN — GLYCERIN 1 SUPPOSITORY: 2.1 SUPPOSITORY RECTAL at 10:03

## 2017-08-19 RX ADMIN — POLYETHYLENE GLYCOL 3350 17 G: 17 POWDER, FOR SOLUTION ORAL at 07:49

## 2017-08-19 RX ADMIN — OXYCODONE HYDROCHLORIDE 5 MG: 5 TABLET ORAL at 06:37

## 2017-08-19 RX ADMIN — CYCLOBENZAPRINE HYDROCHLORIDE 10 MG: 10 TABLET, FILM COATED ORAL at 06:37

## 2017-08-19 RX ADMIN — HYDROXYZINE HYDROCHLORIDE 25 MG: 25 TABLET ORAL at 00:09

## 2017-08-19 RX ADMIN — ACETAMINOPHEN 1000 MG: 500 TABLET, FILM COATED ORAL at 07:47

## 2017-08-19 RX ADMIN — CYCLOBENZAPRINE HYDROCHLORIDE 10 MG: 10 TABLET, FILM COATED ORAL at 00:09

## 2017-08-19 NOTE — PLAN OF CARE
Problem: Goal Outcome Summary  Goal: Goal Outcome Summary  Outcome: Improving  D/I/A: patient A & O X 4, VSS no respiratory distress noted . Left chest and rib manageable with Oxycodone PRN. . Patient received scheduled medications as ordered. Patient with good  appetite . Patient c/o constipations received oral laxative, and suppository X 1 PRN . And patient report had result . Plan patient have order to d/c home. Patient aware and agreeable with plan. Patient awaiting, for Pharmacy to make d/c meds ready. Continue with POC.

## 2017-08-19 NOTE — PLAN OF CARE
Problem: Goal Outcome Summary  Goal: Goal Outcome Summary  Outcome: Improving  Chest pain treated with multiple prn medications and cold packs and patient states that her pain is manageable. No bowel movement this shift. Up independently. Continue with current plan of nursing care, and update MD with concerns as needed.

## 2017-08-19 NOTE — PLAN OF CARE
Problem: Goal Outcome Summary  Goal: Goal Outcome Summary  Outcome: Improving  A & O x 4. VSS. Continues c/o pain on left chest. Pain comfortably managed with oxycodone, tylenol an ibuprofen. Up independently. Tolerating regular diet. PIV SL. Given laxatives, no BM this shift. Continue with plan of care.

## 2017-08-19 NOTE — PLAN OF CARE
Problem: Goal Outcome Summary  Goal: Goal Outcome Summary  Outcome: Adequate for Discharge Date Met:  08/19/17  Writer reviewed, discharge instructions with patient /Mother and instruct patient to f/u with primary provider as ordered .  Patient left the unit at ~ 1450, via wheelchair escorted by Mother and staff to Lobby. Patient took all her belonging with.

## 2017-08-20 NOTE — DISCHARGE SUMMARY
[facility], [place]    Internal Medicine Discharge Summary- Rah Service    Date of Admission:  8/15/2017  Date of Discharge:  8/19/2017  2:53 PM  Discharging Attending Provider: Dr. Weathers   Discharge Team: Rah 5    Discharge Diagnoses   1) Bilateral Pulmonary Embolism      Follow-ups Needed After Discharge   1) Continue rivoroxaban 15mg twice daily for 21 days. Subsequently take 20mg daily for the remainder of the 6 months of treatment.   2) Follow up with hematology in 6 months to discuss risk and benefits of continuing anticoagulation.     Hospital Course   María Ortiz is a 21 year old women with a PMH significant for costochondritis, depression, GERD, and IBD who presented with rib pain, pleuritic chest pain found to have bilateral pulmonary emboli and now on anticoagulation.     The following problems were addressed during her hospitalization:       Bilateral PE- possibly unprovoked   Pleuritic Chest Pain   Presented with pleuritic chest pain and rib pain. D-dimer was elevated. CT PE positive for bilateral PE. Patient had echo without evidence of right heart strain. Started on lovenox and then started on rivaroxoban. Plan to be on 15mg of rivaroxaban BID, for 21 days. There after she will be on 20mg of rivaroxaban for 6 months. She is to follow with hematology in clinic in 6 months to discuss duration of anticoagulation, beyond 6 months. Patient was prescribed oxycodone for 4 days, diclofenac cream, and flexeril to help deal with her pleuritic chest pain.         Dysmenorrhea - was on Levonorgestrel-ethinyl estradiol for birth control. In the setting of PE, OB GYN  Was consulted for recommendations to switch birth control. She was started on an progesterone only pill and will follow up as an outpatient to have IUD placed.       Anxiety/Depression  Patient with hx of depression and anxiety. She was on citalopram 10mg when she presented to the hospital. This was increased to  20mg. Lorazepam which she had been prescribed as an outpatient was discontinued. She was prescribed a short course of atarax.     Consultations This Hospital Stay   MEDICATION HISTORY IP PHARMACY CONSULT  HEMATOLOGY IP CONSULT  VASCULAR ACCESS CARE ADULT IP CONSULT  GYNECOLOGIC ONCOLOGY ADULT IP CONSULT *    Code Status   Full Code       The patient was discussed with Dr. Sarahy Villalba MD  PGY2 IM     ______________________________________________________________________    Physical Exam    Vitals: reviewed WNL  General Appearance: NAD  Respiratory: Clear lungs, no w/r/r  Cardiovascular: r/r/r, no murmurs, rubs or gallops   GI: soft, nontender, bowel sounds appreciated   Skin: no lesions noted  Other: no lower extremity edema    Significant Results and Procedures   Results for orders placed or performed during the hospital encounter of 08/15/17   XR Chest 2 Views    Narrative    XR CHEST 2 VW  8/15/2017 11:42 AM      HISTORY: left sided chest pain    COMPARISON: 12/7/2010    FINDINGS: PA and lateral views of the chest upright. The  cardiomediastinal silhouette and pulmonary vasculature are stable and  within normal limits. There is no focal airspace opacity, pleural  effusion, or pneumothorax. The visualized upper abdomen, osseous  structures, and soft tissues are unremarkable.      Impression    IMPRESSION: Clear lungs.     I have personally reviewed the examination and initial interpretation  and I agree with the findings.    FERMIN HOBBS MD   CT Chest Pulmonary Embolism w Contrast     Value    Radiologist flags Bilateral pulmonary emboli (AA)    Addendum: 8/15/2017    Addendum:    Three-dimensional (3D) post-processed angiographic images were  reconstructed, archived to PACS and used in interpretation of this  study.    ANNETTE HAWKINS MD      Narrative    Exam: Chest CT Pulmonary Angiogram 8/15/2017 12:57 PM..    History: Left lateral chest pain.    Comparison: None.    Technique: Volumetric  helical acquisition of the chest was obtained  following pulmonary embolism protocol.     Findings:    There is adequate opacification of the pulmonary arteries. There are  bilateral central pulmonary artery embolisms including the left lower  lobe lobar pulmonary artery, which is near occlusive, extending into  lower lobe segmental branches, and extending partially into the left  upper lobe pulmonary artery. There is additionally a right pulmonary  artery lobar branch embolism extending into the superior segmental  branches and lower lobe segmental branches. There is somewhat  increased attenuation of the anterior upper mediastinal fat, which is  likely non-opacified innominate vessels, or possibly thymic tissue  anteriorly.    No evidence of right heart strain. No significant mediastinal  lymphadenopathy.    Bilateral wedge-shaped peripheral lower lobe opacities. Bibasilar  subsegmental atelectasis. Sub-4 mm nodular opacities along the left  major fissure (series 7, image 138).    Limited visualized upper abdomen. Splenule.    No suspicious bony lesions.       Impression    Impression:   1. Extensive bilateral pulmonary emboli. No evidence of right heart  strain.  2. Bilateral lower lobe peripheral infarcts.  3. Sub-4 mm nodular nodules along the left major fissure may represent  fissural lymph nodes and do not require any additional routine  follow-up in a low risk patient per Fleischner criteria guidelines.    [Critical Result: Bilateral pulmonary emboli]    Finding was identified on 8/15/2017 12:51 PM.     Dr. Gutiérrez was contacted by Dr. Zamudio at 8/15/2017 12:52 PM and  verbalized understanding of the critical finding.     I have personally reviewed the examination and initial interpretation  and I agree with the findings.    ANNETTE HAWKINS MD   XR Chest Port 1 View    Narrative    Exam:  XR CHEST PORT 1 VW, 8/18/2017 2:08 AM    History: chest pain    Comparison:  CT chest 8/15/2017    Findings: Single AP view  the chest. Cardiac silhouette and pulmonary  vasculature are within normal limits. The lungs are clear. There is no  pleural effusion or pneumothorax. Upper abdomen is unremarkable.      Impression    Impression: Clear lungs.    I have personally reviewed the examination and initial interpretation  and I agree with the findings.    ILIA LUNA MD       Pending Results     Unresulted Labs Ordered in the Past 30 Days of this Admission     Date and Time Order Name Status Description    8/16/2017 0001 Protein S Antigen Total and Free In process     8/15/2017 1759 Factor 5 Leiden Mutation Analysis In process              Primary Care Physician   Lindsay Ley    Discharge Disposition   Discharged to home  Condition at discharge: Stable    Discharge Orders     Activity   Your activity upon discharge: activity as tolerated     When to contact your care team   Return to the ER if you have worsening pain, worsening breathing, fever, fainting, uncontrollable bleeding     Adult [facility] Follow-up and recommended labs and tests   Follow up with primary care provider, Lindsay Ley, within 7 days for hospital follow- up.      Follow up with hematology in 6 months. [phone]    Appointments on [place] and/or [facility] (with [facility] or [facility] provider or service). Call [phone] if you haven't heard regarding these appointments within 7 days of discharge.     Reason for your hospital stay   Dear María Ortiz    Your were hospitalized at [facility] with because you had blood clots in your lungs and you were having pain. You were treated with blood thinners and pain medications. Over your hospitalization you  improved and today you are ready to be discharged. Your pain should improve overtime. If you develop fever, shortness of breath, light headedness, worsening chest pain please seek medical attention.    We are suggesting the following medication changes:  1) Rivaroxaban 15mg  twice daily for 21 days (up to September 5, 2017). There after take 20mgs daily until follow up with hematology in 6 months. Refills to be obtained from primary care.   2) Micronor (norethindrone) 0.35 mg daily. No missed days for birth control   3) Take tylenol 3 times daily, for chest pain.   4) Take oxycodone 5mg every 3 hours as needed for pain.  5) Apply diclofenac on the area of your chest as needed 4 times a day.   6) You can take flexeril if need for muscle spasms or chest pain up to 3 times daily. Be aware that this medication can make you drowsy. Do not drive or operate machinery while on this medication.   7) You can take atarax 25mg up to 4 times daily as needed for anxiety.   8)Take miralax and senna-docusate as needed daily for constipation, which is likely related to your use of oxycodone.  9) Your dose of es citalopram was increased to 20mg daily           Please set up an appointment with:  1)Your primary care within 7 days to discuss your hospitalization and new medications.   2) Follow up with hematology/oncology in 6 months to discuss need for continued anticoagulation. Clinic phone number: [phone]    It was a pleasure meeting with you today. Thank you for allowing me and my team the privilege of caring for you today. You are the reason we are here, and I truly hope we provided you with the excellent service you deserve. Please let us know if there is anything else we can do for you so that we can be sure you are leaving completely satisfied with your care experience.    Take care!  Rose Villalba MD  Department of Medicine  [facility]     Diet   Follow this diet upon discharge: Orders Placed This Encounter     Regular Diet Adult       Discharge Medications   Discharge Medication List as of 8/19/2017  2:22 PM      START taking these medications    Details   acetaminophen (TYLENOL) 500 MG tablet Take 2 tablets (1,000 mg) by mouth 3 times daily, Disp-30 tablet, R-0, E-Prescribe       polyethylene glycol (MIRALAX/GLYCOLAX) Packet Take 17 g by mouth as needed for constipation, Disp-7 packet, R-0, E-Prescribe      senna-docusate (SENOKOT-S;PERICOLACE) 8.6-50 MG per tablet Take 1 tablet by mouth 2 times daily as needed for constipation, Disp-10 tablet, R-0, E-Prescribe         CONTINUE these medications which have CHANGED    Details   oxyCODONE (ROXICODONE) 5 MG IR tablet Take 1 tablet (5 mg) by mouth every 3 hours as needed for severe pain 8/19 (up to 8 pills total in one day, with each one being 3 hours apart or longer between doses)  8/20 ( up to 7 pillsin one day, with each one being 3 hours apart or longer between do ses)  8/21 (up to 6 pills in one day, with each one being 3 hours apart or longer between doses)  8/22 (up to 5 pills in one day, with each one being 3 hours apart or longer between doses), Disp-26 tablet, R-0, Local Print      escitalopram (LEXAPRO) 20 MG tablet Take 1 tablet (20 mg) by mouth daily, Disp-30 tablet, R-0, E-Prescribe      rivaroxaban ANTICOAGULANT (XARELTO) 15 MG TABS tablet Take 1 tablet (15 mg) by mouth 2 times daily (with meals) Take 15mg two times daily for 21 days. There after take 20mg daily., Disp-37 tablet, R-0, Local Print      hydrOXYzine (ATARAX) 25 MG tablet Take 1 tablet (25 mg) by mouth every 4 hours as needed for anxiety, Disp-30 tablet, R-0, Local Print      norethindrone (MICRONOR) 0.35 MG per tablet Take 1 tablet (0.35 mg) by mouth daily, Disp-28 tablet, R-1, Local Print      cyclobenzaprine (FLEXERIL) 10 MG tablet Take 1 tablet (10 mg) by mouth 3 times daily as needed for muscle spasms, Disp-15 tablet, R-0, Local Print      diclofenac (VOLTAREN) 1 % GEL topical gel Place 2 g onto the skin 4 times daily Apply to areas of pain 4 times daily as neededDisp-100 g, R-0Local Print         CONTINUE these medications which have NOT CHANGED    Details   OMEPRAZOLE PO Take 40 mg by mouth every morning, Historical      DICYCLOMINE HCL PO Take 10-20 mg by  mouth 2 times daily as needed , Historical      Ondansetron (ZOFRAN ODT PO) Take 4 mg by mouth every 8 hours as needed for nausea, Historical      albuterol (PROAIR HFA/PROVENTIL HFA/VENTOLIN HFA) 108 (90 BASE) MCG/ACT Inhaler Inhale 2 puffs into the lungs every 4 hours as needed for shortness of breath / dyspnea or wheezing, Historical      NAPROXEN PO Take 250 mg by mouth 3 times daily as needed for moderate pain, Historical      Multiple Vitamins-Minerals (WOMENS MULTIVITAMIN PLUS PO) Take 2 tablets by mouth daily, Historical         STOP taking these medications       ibuprofen (ADVIL/MOTRIN) 400 MG tablet Comments:   Reason for Stopping:         capsaicin (ZOSTRIX) 0.025 % CREA cream Comments:   Reason for Stopping:         lidocaine (LIDODERM) 5 % Patch Comments:   Reason for Stopping:         LORazepam (ATIVAN PO) Comments:   Reason for Stopping:         levonorgestrel-ethinyl estradiol (AVIANE,ALESSE,LESSINA) 0.1-20 MG-MCG per tablet Comments:   Reason for Stopping:             Allergies   No Known Allergies

## 2017-08-21 ENCOUNTER — CARE COORDINATION (OUTPATIENT)
Dept: CARE COORDINATION | Facility: CLINIC | Age: 22
End: 2017-08-21

## 2017-08-21 NOTE — PROGRESS NOTES
"[facility]  \"Hello, my name is Jessi Alvarez , and I am calling from the [facility].  I want to check in and see how you are doing, after leaving the hospital.  You may also receive a call from your Care Coordinator (care team), but I want to make sure you don t have any urgent needs.  I have a couple questions to review with you:     Post-Discharge Outreach                                                    María Ortiz is a 21 year old female     Follow-up Appointments           Adult [facility] Follow-up and recommended labs and tests         Follow up with primary care provider, Lindsay Ley, within 7 days for hospital follow- up.       Follow up with hematology in 6 months. [phone]                Care Team:    Patient Care Team       Relationship Specialty Notifications Start End    Lindsay Ley PCP - General Family Practice  8/15/17     Phone: [phone] Fax: [phone]         [place] [id number]            Transition of Care Review                                                      Patient was called three times and no answer so post 24 hr DC follow up calls will be closed out                   Plan                                                      Thanks for your time.  Your Care Coordinator may follow-up within the next couple days.  In the meantime if you have questions, concerns or problems call your care team.        Jessi Alvarez    "

## 2017-08-29 LAB — LAB SCANNED RESULT: NORMAL

## 2017-12-09 ENCOUNTER — HOSPITAL ENCOUNTER (EMERGENCY)
Facility: CLINIC | Age: 22
Discharge: HOME | End: 2017-12-09
Attending: INTERNAL MEDICINE | Admitting: INTERNAL MEDICINE
Payer: COMMERCIAL

## 2017-12-09 VITALS
OXYGEN SATURATION: 98 % | HEIGHT: 62 IN | WEIGHT: 126 LBS | SYSTOLIC BLOOD PRESSURE: 117 MMHG | DIASTOLIC BLOOD PRESSURE: 60 MMHG | BODY MASS INDEX: 23.19 KG/M2 | RESPIRATION RATE: 18 BRPM | TEMPERATURE: 96.2 F | HEART RATE: 92 BPM

## 2017-12-09 DIAGNOSIS — N94.6 DYSMENORRHEA: ICD-10-CM

## 2017-12-09 LAB
ALBUMIN SERPL-MCNC: 4.7 G/DL (ref 3.4–5)
ALBUMIN UR-MCNC: 30 MG/DL
ALP SERPL-CCNC: 55 U/L (ref 40–150)
ALT SERPL W P-5'-P-CCNC: 71 U/L (ref 0–50)
ANION GAP SERPL CALCULATED.3IONS-SCNC: 13 MMOL/L (ref 3–14)
APPEARANCE UR: CLEAR
AST SERPL W P-5'-P-CCNC: 37 U/L (ref 0–45)
BASOPHILS # BLD AUTO: 0 10E9/L (ref 0–0.2)
BASOPHILS NFR BLD AUTO: 0.2 %
BILIRUB SERPL-MCNC: 0.8 MG/DL (ref 0.2–1.3)
BILIRUB UR QL STRIP: NEGATIVE
BUN SERPL-MCNC: 7 MG/DL (ref 7–30)
CALCIUM SERPL-MCNC: 10.2 MG/DL (ref 8.5–10.1)
CHLORIDE SERPL-SCNC: 107 MMOL/L (ref 94–109)
CO2 SERPL-SCNC: 20 MMOL/L (ref 20–32)
COLOR UR AUTO: YELLOW
CREAT SERPL-MCNC: 0.7 MG/DL (ref 0.52–1.04)
DIFFERENTIAL METHOD BLD: ABNORMAL
EOSINOPHIL # BLD AUTO: 0 10E9/L (ref 0–0.7)
EOSINOPHIL NFR BLD AUTO: 0.2 %
ERYTHROCYTE [DISTWIDTH] IN BLOOD BY AUTOMATED COUNT: 13.1 % (ref 10–15)
GFR SERPL CREATININE-BSD FRML MDRD: >90 ML/MIN/1.7M2
GLUCOSE SERPL-MCNC: 152 MG/DL (ref 70–99)
GLUCOSE UR STRIP-MCNC: NEGATIVE MG/DL
HCG UR QL: NEGATIVE
HCT VFR BLD AUTO: 47.5 % (ref 35–47)
HGB BLD-MCNC: 16.7 G/DL (ref 11.7–15.7)
HGB UR QL STRIP: ABNORMAL
IMM GRANULOCYTES # BLD: 0 10E9/L (ref 0–0.4)
IMM GRANULOCYTES NFR BLD: 0.1 %
INR PPP: 0.99 (ref 0.86–1.14)
INTERNAL QC OK POCT: YES
KETONES UR STRIP-MCNC: >150 MG/DL
LEUKOCYTE ESTERASE UR QL STRIP: ABNORMAL
LYMPHOCYTES # BLD AUTO: 1.9 10E9/L (ref 0.8–5.3)
LYMPHOCYTES NFR BLD AUTO: 18.1 %
MCH RBC QN AUTO: 32.2 PG (ref 26.5–33)
MCHC RBC AUTO-ENTMCNC: 35.2 G/DL (ref 31.5–36.5)
MCV RBC AUTO: 92 FL (ref 78–100)
MONOCYTES # BLD AUTO: 0.4 10E9/L (ref 0–1.3)
MONOCYTES NFR BLD AUTO: 3.7 %
MUCOUS THREADS #/AREA URNS LPF: PRESENT /LPF
NEUTROPHILS # BLD AUTO: 8 10E9/L (ref 1.6–8.3)
NEUTROPHILS NFR BLD AUTO: 77.7 %
NITRATE UR QL: NEGATIVE
NRBC # BLD AUTO: 0 10*3/UL
NRBC BLD AUTO-RTO: 0 /100
PH UR STRIP: 8 PH (ref 5–7)
PLATELET # BLD AUTO: 197 10E9/L (ref 150–450)
POTASSIUM SERPL-SCNC: 4.5 MMOL/L (ref 3.4–5.3)
PROT SERPL-MCNC: 9.2 G/DL (ref 6.8–8.8)
RBC # BLD AUTO: 5.18 10E12/L (ref 3.8–5.2)
RBC #/AREA URNS AUTO: 33 /HPF (ref 0–2)
SODIUM SERPL-SCNC: 140 MMOL/L (ref 133–144)
SOURCE: ABNORMAL
SP GR UR STRIP: 1.03 (ref 1–1.03)
SQUAMOUS #/AREA URNS AUTO: <1 /HPF (ref 0–1)
UROBILINOGEN UR STRIP-MCNC: NORMAL MG/DL (ref 0–2)
WBC # BLD AUTO: 10.3 10E9/L (ref 4–11)
WBC #/AREA URNS AUTO: 1 /HPF (ref 0–2)

## 2017-12-09 PROCEDURE — 81025 URINE PREGNANCY TEST: CPT | Performed by: INTERNAL MEDICINE

## 2017-12-09 PROCEDURE — 96376 TX/PRO/DX INJ SAME DRUG ADON: CPT | Performed by: INTERNAL MEDICINE

## 2017-12-09 PROCEDURE — 85025 COMPLETE CBC W/AUTO DIFF WBC: CPT | Performed by: INTERNAL MEDICINE

## 2017-12-09 PROCEDURE — 99285 EMERGENCY DEPT VISIT HI MDM: CPT | Mod: 25 | Performed by: INTERNAL MEDICINE

## 2017-12-09 PROCEDURE — 25000128 H RX IP 250 OP 636: Performed by: INTERNAL MEDICINE

## 2017-12-09 PROCEDURE — 81001 URINALYSIS AUTO W/SCOPE: CPT | Performed by: INTERNAL MEDICINE

## 2017-12-09 PROCEDURE — 80053 COMPREHEN METABOLIC PANEL: CPT | Performed by: INTERNAL MEDICINE

## 2017-12-09 PROCEDURE — 96374 THER/PROPH/DIAG INJ IV PUSH: CPT | Performed by: INTERNAL MEDICINE

## 2017-12-09 PROCEDURE — 85610 PROTHROMBIN TIME: CPT | Performed by: INTERNAL MEDICINE

## 2017-12-09 PROCEDURE — 96375 TX/PRO/DX INJ NEW DRUG ADDON: CPT | Performed by: INTERNAL MEDICINE

## 2017-12-09 PROCEDURE — 99285 EMERGENCY DEPT VISIT HI MDM: CPT | Mod: Z6 | Performed by: INTERNAL MEDICINE

## 2017-12-09 RX ORDER — TRAMADOL HYDROCHLORIDE 50 MG/1
50 TABLET ORAL
Qty: 10 TABLET | Refills: 0 | Status: ON HOLD | OUTPATIENT
Start: 2017-12-09 | End: 2018-01-06

## 2017-12-09 RX ORDER — METOCLOPRAMIDE HYDROCHLORIDE 5 MG/ML
5 INJECTION INTRAMUSCULAR; INTRAVENOUS ONCE
Status: COMPLETED | OUTPATIENT
Start: 2017-12-09 | End: 2017-12-09

## 2017-12-09 RX ORDER — METOCLOPRAMIDE 5 MG/1
5 TABLET ORAL 4 TIMES DAILY PRN
Qty: 10 TABLET | Refills: 0 | Status: ON HOLD | OUTPATIENT
Start: 2017-12-09 | End: 2018-01-06

## 2017-12-09 RX ORDER — NAPROXEN 500 MG/1
500 TABLET ORAL 2 TIMES DAILY PRN
Qty: 30 TABLET | Refills: 0 | Status: ON HOLD | OUTPATIENT
Start: 2017-12-09 | End: 2018-01-06

## 2017-12-09 RX ORDER — KETOROLAC TROMETHAMINE 30 MG/ML
30 INJECTION, SOLUTION INTRAMUSCULAR; INTRAVENOUS ONCE
Status: DISCONTINUED | OUTPATIENT
Start: 2017-12-09 | End: 2017-12-09 | Stop reason: CLARIF

## 2017-12-09 RX ORDER — KETOROLAC TROMETHAMINE 30 MG/ML
15 INJECTION, SOLUTION INTRAMUSCULAR; INTRAVENOUS ONCE
Status: COMPLETED | OUTPATIENT
Start: 2017-12-09 | End: 2017-12-09

## 2017-12-09 RX ORDER — HYDROMORPHONE HCL/0.9% NACL/PF 0.2MG/0.2
0.2 SYRINGE (ML) INTRAVENOUS ONCE
Status: COMPLETED | OUTPATIENT
Start: 2017-12-09 | End: 2017-12-09

## 2017-12-09 RX ADMIN — HYDROMORPHONE HYDROCHLORIDE 0.2 MG: 10 INJECTION, SOLUTION INTRAMUSCULAR; INTRAVENOUS; SUBCUTANEOUS at 16:10

## 2017-12-09 RX ADMIN — SODIUM CHLORIDE, POTASSIUM CHLORIDE, SODIUM LACTATE AND CALCIUM CHLORIDE 1000 ML: 600; 310; 30; 20 INJECTION, SOLUTION INTRAVENOUS at 16:06

## 2017-12-09 RX ADMIN — METOCLOPRAMIDE HYDROCHLORIDE 5 MG: 5 INJECTION INTRAMUSCULAR; INTRAVENOUS at 16:06

## 2017-12-09 RX ADMIN — KETOROLAC TROMETHAMINE 15 MG: 30 INJECTION, SOLUTION INTRAMUSCULAR at 17:19

## 2017-12-09 RX ADMIN — METOCLOPRAMIDE HYDROCHLORIDE 5 MG: 5 INJECTION INTRAMUSCULAR; INTRAVENOUS at 17:20

## 2017-12-09 ASSESSMENT — ENCOUNTER SYMPTOMS
COUGH: 0
DIARRHEA: 0
SHORTNESS OF BREATH: 0
CONSTIPATION: 0
ABDOMINAL PAIN: 1
BLOOD IN STOOL: 0
NAUSEA: 1
FEVER: 0
VOMITING: 1

## 2017-12-09 NOTE — ED AVS SNAPSHOT
[facility], [place], Emergency Department    [address] [address]    Phone:  [phone]                                       María Ortiz   MRN: [id number]    Department:  [facility], Emergency Department   Date of Visit:  12/9/2017           After Visit Summary Signature Page     I have received my discharge instructions, and my questions have been answered. I have discussed any challenges I see with this plan with the nurse or doctor.    ..........................................................................................................................................  Patient/Patient Representative Signature      ..........................................................................................................................................  Patient Representative Print Name and Relationship to Patient    ..................................................               ................................................  Date                                            Time    ..........................................................................................................................................  Reviewed by Signature/Title    ...................................................              ..............................................  Date                                                            Time

## 2017-12-09 NOTE — ED AVS SNAPSHOT
[facility], Emergency Department    [address] [address]    Phone:  [phone]                                       María Ortiz   MRN: [id number]    Department:  [facility], Emergency Department   Date of Visit:  12/9/2017           Patient Information     Date Of Birth          1995        Your diagnoses for this visit were:     Dysmenorrhea        You were seen by Ginna De Souza MD.        Discharge Instructions         Painful Menstrual Periods (Dysmenorrhea)    Dysmenorrhea is the term used to describe painful menstrual periods.  The uterus is a muscle. Normally, chemicals called prostaglandins cause the uterus to contract during your period. The contractions push out the build-up of tissue that occurs each month inside the uterus. If the contraction is very strong, it can cause pain. The pain may feel like cramping in the lower abdomen, lower back, or thighs. In severe cases, you may have other symptoms as well. These can include nausea, vomiting, loose stools, sweating, or dizziness.  There are 2 types of dysmenorrhea:  Primary dysmenorrhea refers to common menstrual cramps. It may begin 1 or 2 years after you first get your period. It may get better or go away as you get older or when you have a baby. The cramps are most often felt just before, or on the day of your period. They may last 1 to 3 days. Treatment is with medicines and comfort measures as described below (see the  Home care  section).  Secondary dysmenorrhea may start later in life. It describes menstrual pain that occurs due to underlying health problem. The pain may last longer than common menstrual cramps. It may also worsen over time. Some problems that can lead to secondary dysmenorrhea include:     Pelvic inflammatory disease (PID): Infection that involves the female reproductive organs, such as the uterus and fallopian tubes    Fibroids: Benign growths within the wall of the uterus (not  cancer)    Endometriosis: Tissue that normally only lines the uterus also grows outside of it (because the abnormal tissue also swells and bleeds each month, it can cause pain)  Once the cause of secondary dysmenorrhea is found, it can be treated. Your healthcare provider will discuss options with you as needed. Your care may also include some of the treatments described below (see the  Home care  section).  Home care  Medicines  Certain medicines can be used to help relieve or prevent menstrual pain and cramping. These can include:    Nonsteroidal anti-inflammatory drugs (NSAIDs), such as ibuprofen    Prescription pain medicine, if needed    Hormone therapy (this includes most methods of hormonal birth control such as pills, shots, or a hormone-releasing IUD)  General care  To help relieve pain and cramping, try these tips:    Rest as needed.    Apply a heating pad to the lower belly or back as directed. A warm bath or massage to these areas may also help.    Exercise regularly. Many women find that being more active each week helps reduce pain and cramping.    Ask your healthcare provider for advice about other treatments you can try to help control pain and cramping.  Follow-up care  Follow up with your healthcare provider as advised.  When to seek medical advice  Call your healthcare provider right away if any of these occur:    Fever of 100.4 F (38 C) or higher, or as directed by your provider    Pain or cramping worsens or doesn t improve with medicine    Pain or cramping lasts longer than usual or occurs between periods    Unusual vaginal discharge between periods    Bleeding becomes heavy (soaking more than 1 pad or tampon every hour for 3 hours)    Passage of pink or gray tissue from the vagina  Date Last Reviewed: 6/11/2015 2000-2017 [employer]. [address] [place] [address], [place] [address]. All rights reserved. This information is not intended as a substitute for professional medical care.  Always follow your healthcare professional's instructions.      Please make an appointment to follow up with Your OB Gyn and Your Primary Care Provider as soon as possible even if entirely better.     24 Hour Appointment Hotline       To make an appointment at any [facility] clinic, call [phone] ([phone]). If you don't have a family doctor or clinic, we will help you find one. [facility] clinics are conveniently located to serve the needs of you and your family.             Review of your medicines      START taking        Dose / Directions Last dose taken    metoclopramide 5 MG tablet   Commonly known as:  REGLAN   Dose:  5 mg   Quantity:  10 tablet        Take 1 tablet (5 mg) by mouth 4 times daily as needed   Refills:  0        traMADol 50 MG tablet   Commonly known as:  ULTRAM   Dose:  50 mg   Quantity:  10 tablet        Take 1 tablet (50 mg) by mouth nightly as needed for moderate pain   Refills:  0          CONTINUE these medicines which may have CHANGED, or have new prescriptions. If we are uncertain of the size of tablets/capsules you have at home, strength may be listed as something that might have changed.        Dose / Directions Last dose taken    naproxen 500 MG tablet   Commonly known as:  NAPROSYN   Dose:  500 mg   What changed:    - medication strength  - how much to take  - when to take this   Quantity:  30 tablet        Take 1 tablet (500 mg) by mouth 2 times daily as needed for moderate pain   Refills:  0          Our records show that you are taking the medicines listed below. If these are incorrect, please call your family doctor or clinic.        Dose / Directions Last dose taken    acetaminophen 500 MG tablet   Commonly known as:  TYLENOL   Dose:  1000 mg   Quantity:  30 tablet        Take 2 tablets (1,000 mg) by mouth 3 times daily   Refills:  0        albuterol 108 (90 BASE) MCG/ACT Inhaler   Commonly known as:  PROAIR HFA/PROVENTIL HFA/VENTOLIN HFA   Dose:  2 puff        Inhale 2  puffs into the lungs every 4 hours as needed for shortness of breath / dyspnea or wheezing   Refills:  0        cyclobenzaprine 10 MG tablet   Commonly known as:  FLEXERIL   Dose:  10 mg   Quantity:  15 tablet        Take 1 tablet (10 mg) by mouth 3 times daily as needed for muscle spasms   Refills:  0        diclofenac 1 % Gel topical gel   Commonly known as:  VOLTAREN   Dose:  2 g   Quantity:  100 g        Place 2 g onto the skin 4 times daily Apply to areas of pain 4 times daily as needed   Refills:  0        DICYCLOMINE HCL PO   Dose:  10-20 mg        Take 10-20 mg by mouth 2 times daily as needed   Refills:  0        escitalopram 20 MG tablet   Commonly known as:  LEXAPRO   Dose:  20 mg   Quantity:  30 tablet        Take 1 tablet (20 mg) by mouth daily   Refills:  0        hydrOXYzine 25 MG tablet   Commonly known as:  ATARAX   Dose:  25 mg   Quantity:  30 tablet        Take 1 tablet (25 mg) by mouth every 4 hours as needed for anxiety   Refills:  0        norethindrone 0.35 MG per tablet   Commonly known as:  MICRONOR   Dose:  1 tablet   Quantity:  28 tablet        Take 1 tablet (0.35 mg) by mouth daily   Refills:  1        OMEPRAZOLE PO   Dose:  40 mg        Take 40 mg by mouth every morning   Refills:  0        oxyCODONE IR 5 MG tablet   Commonly known as:  ROXICODONE   Dose:  5 mg   Quantity:  26 tablet        Take 1 tablet (5 mg) by mouth every 3 hours as needed for severe pain 8/19 (up to 8 pills total in one day, with each one being 3 hours apart or longer between doses) 8/20 ( up to 7 pillsin one day, with each one being 3 hours apart or longer between doses) 8/21 (up to 6 pills in one day, with each one being 3 hours apart or longer between doses) 8/22 (up to 5 pills in one day, with each one being 3 hours apart or longer between doses)   Refills:  0        polyethylene glycol Packet   Commonly known as:  MIRALAX/GLYCOLAX   Dose:  17 g   Quantity:  7 packet        Take 17 g by mouth as needed for  constipation   Refills:  0        rivaroxaban ANTICOAGULANT 15 MG Tabs tablet   Commonly known as:  XARELTO   Dose:  15 mg   Quantity:  37 tablet        Take 1 tablet (15 mg) by mouth 2 times daily (with meals) Take 15mg two times daily for 21 days. There after take 20mg daily.   Refills:  0        senna-docusate 8.6-50 MG per tablet   Commonly known as:  SENOKOT-S;PERICOLACE   Dose:  1 tablet   Quantity:  10 tablet        Take 1 tablet by mouth 2 times daily as needed for constipation   Refills:  0        WOMENS MULTIVITAMIN PLUS PO   Dose:  2 tablet        Take 2 tablets by mouth daily   Refills:  0        ZOFRAN ODT PO   Dose:  4 mg        Take 4 mg by mouth every 8 hours as needed for nausea   Refills:  0                Prescriptions were sent or printed at these locations (3 Prescriptions)                   Other Prescriptions                Printed at Department/Unit printer (3 of 3)         metoclopramide (REGLAN) 5 MG tablet               naproxen (NAPROSYN) 500 MG tablet               traMADol (ULTRAM) 50 MG tablet                Procedures and tests performed during your visit     CBC with platelets differential    Comprehensive metabolic panel    INR    UA with Microscopic    hCG qual urine POCT      Orders Needing Specimen Collection     None      Pending Results     No orders found from 12/7/2017 to 12/10/2017.            Pending Culture Results     No orders found from 12/7/2017 to 12/10/2017.            Pending Results Instructions     If you had any lab results that were not finalized at the time of your Discharge, you can call the ED Lab Result RN at [phone]. You will be contacted by this team for any positive Lab results or changes in treatment. The nurses are available 7 days a week from 10A to 6:30P.  You can leave a message 24 hours per day and they will return your call.        Thank you for choosing [facility]       Thank you for choosing [facility] for your care. Our goal is always to  "provide you with excellent care. Hearing back from our patients is one way we can continue to improve our services. Please take a few minutes to complete the written survey that you may receive in the mail after you visit with us. Thank you!        Inter[employer] Information     [employer] lets you send messages to your doctor, view your test results, renew your prescriptions, schedule appointments and more. To sign up, go to [email or web address] . Click on \"Log in\" on the left side of the screen, which will take you to the Welcome page. Then click on \"Sign up Now\" on the right side of the page.     You will be asked to enter the access code listed below, as well as some personal information. Please follow the directions to create your username and password.     Your access code is: [email or web address]  Expires: 3/9/2018  5:53 PM     Your access code will  in 90 days. If you need help or a new code, please call your [facility] clinic or [phone].        Care EveryWhere ID     This is your Care EveryWhere ID. This could be used by other organizations to access your [facility] medical records  [id number]        Equal Access to Services     JACQUE MCGUIRE : Hadii malena Kuo, washandada cole, qaybta kaalmada denia, juan diego salvador. So [facility] [phone].    ATENCIÓN: Si habla español, tiene a reardon disposición servicios gratuitos de asistencia lingüística. Eliana al [phone].    We comply with applicable federal civil rights laws and [place] laws. We do not discriminate on the basis of race, color, national origin, age, disability, sex, sexual orientation, or gender identity.            After Visit Summary       This is your record. Keep this with you and show to your community pharmacist(s) and doctor(s) at your next visit.                  "

## 2017-12-09 NOTE — LETTER
Transition Communication Hand-off for Care Transitions to Next Level of Care Provider    Name: María Ortiz  MRN #: [id number]  Primary Care Provider: Lindsay Ley     Primary Clinic: [address] [address]     Reason for Hospitalization:  evaluation of abdominal pain that is associated with some nausea and vomiting  Admit Date/Time: 12/9/2017  3:29 PM  Discharge Date: 12/9/17 7:30 pm  Payor Source: Payor: MEDICA / Plan: MEDICA ELECT / Product Type: Indemnity /         Reason for Communication Hand-off Referral: Difficulty understanding plan of care    Discharge Plan:  María needs a follow-up appointment with Dr. Ley. She needs IUD placement or alternative birth control since she is having difficulty with menstrual cycles. She is aware she needs to talk to your business office about outstanding co-payments. Would it be possible for her to work out a payment plan and if so, would you business office mind contacting María directly?       KERRI Jacobs, [facility]  Social Work Services, Emergency Dept [facility]  Pager: [phone] Mon-Sat 9 am - 9 pm, on-call/after hours pager [phone]    AVS/Discharge Summary is the source of truth; this is a helpful guide for improved communication of patient story

## 2017-12-09 NOTE — ED PROVIDER NOTES
History     Chief Complaint   Patient presents with     Abdominal Pain     Nausea & Vomiting     HPI  María Ortiz is a 22 year old female with a past medical history notable for bilateral pulmonary embolism (on Xarelto) who presents to the emergency department today for the evaluation of abdominal pain that is associated with some nausea and vomiting.  The patient began menstruating today, and she states that she typically has excruciating abdominal cramping associated with this.  She is normally on birth control, but due to issues with insurance she has not been able to acquire this medication for the past few months.  She states that the cramping that she had last month was not as bad as this month, and she is concerned that there may be some other etiology to her pain.  She has been taking ibuprofen and Midol, but this has not provided her with any relief.  She denies any chest pain, shortness of breath, diarrhea, congestion, cough, dysuric symptoms or other concerns or complaints.  She is scheduled within the near future to have placement of an IUD.    PAST MEDICAL HISTORY:   Past Medical History:   Diagnosis Date     Anxiety      Depressive disorder      IBS (irritable bowel syndrome)        PAST SURGICAL HISTORY:   Past Surgical History:   Procedure Laterality Date     ORTHOPEDIC SURGERY      R shoulder 2012       FAMILY HISTORY: No family history on file.    SOCIAL HISTORY:   Social History   Substance Use Topics     Smoking status: Never Smoker     Smokeless tobacco: Not on file     Alcohol use Not on file       Discharge Medication List as of 12/9/2017  7:14 PM      START taking these medications    Details   metoclopramide (REGLAN) 5 MG tablet Take 1 tablet (5 mg) by mouth 4 times daily as needed, Disp-10 tablet, R-0, Local Print      traMADol (ULTRAM) 50 MG tablet Take 1 tablet (50 mg) by mouth nightly as needed for moderate pain, Disp-10 tablet, R-0, Local Print         CONTINUE these medications  which have CHANGED    Details   naproxen (NAPROSYN) 500 MG tablet Take 1 tablet (500 mg) by mouth 2 times daily as needed for moderate pain, Disp-30 tablet, R-0, Local Print         CONTINUE these medications which have NOT CHANGED    Details   oxyCODONE (ROXICODONE) 5 MG IR tablet Take 1 tablet (5 mg) by mouth every 3 hours as needed for severe pain 8/19 (up to 8 pills total in one day, with each one being 3 hours apart or longer between doses)  8/20 ( up to 7 pillsin one day, with each one being 3 hours apart or longer between do ses)  8/21 (up to 6 pills in one day, with each one being 3 hours apart or longer between doses)  8/22 (up to 5 pills in one day, with each one being 3 hours apart or longer between doses), Disp-26 tablet, R-0, Local Print      escitalopram (LEXAPRO) 20 MG tablet Take 1 tablet (20 mg) by mouth daily, Disp-30 tablet, R-0, E-Prescribe      rivaroxaban ANTICOAGULANT (XARELTO) 15 MG TABS tablet Take 1 tablet (15 mg) by mouth 2 times daily (with meals) Take 15mg two times daily for 21 days. There after take 20mg daily., Disp-37 tablet, R-0, Local Print      hydrOXYzine (ATARAX) 25 MG tablet Take 1 tablet (25 mg) by mouth every 4 hours as needed for anxiety, Disp-30 tablet, R-0, Local Print      norethindrone (MICRONOR) 0.35 MG per tablet Take 1 tablet (0.35 mg) by mouth daily, Disp-28 tablet, R-1, Local Print      cyclobenzaprine (FLEXERIL) 10 MG tablet Take 1 tablet (10 mg) by mouth 3 times daily as needed for muscle spasms, Disp-15 tablet, R-0, Local Print      diclofenac (VOLTAREN) 1 % GEL topical gel Place 2 g onto the skin 4 times daily Apply to areas of pain 4 times daily as neededDisp-100 g, R-0Local Print      acetaminophen (TYLENOL) 500 MG tablet Take 2 tablets (1,000 mg) by mouth 3 times daily, Disp-30 tablet, R-0, E-Prescribe      polyethylene glycol (MIRALAX/GLYCOLAX) Packet Take 17 g by mouth as needed for constipation, Disp-7 packet, R-0, E-Prescribe      senna-docusate  "(SENOKOT-S;PERICOLACE) 8.6-50 MG per tablet Take 1 tablet by mouth 2 times daily as needed for constipation, Disp-10 tablet, R-0, E-Prescribe      OMEPRAZOLE PO Take 40 mg by mouth every morning, Historical      DICYCLOMINE HCL PO Take 10-20 mg by mouth 2 times daily as needed , Historical      Ondansetron (ZOFRAN ODT PO) Take 4 mg by mouth every 8 hours as needed for nausea, Historical      albuterol (PROAIR HFA/PROVENTIL HFA/VENTOLIN HFA) 108 (90 BASE) MCG/ACT Inhaler Inhale 2 puffs into the lungs every 4 hours as needed for shortness of breath / dyspnea or wheezing, Historical      Multiple Vitamins-Minerals (WOMENS MULTIVITAMIN PLUS PO) Take 2 tablets by mouth daily, Historical              No Known Allergies          I have reviewed the Medications, Allergies, Past Medical and Surgical History, and Social History in the Epic system.    Review of Systems   Constitutional: Negative for fever.   HENT: Negative for congestion.    Respiratory: Negative for cough and shortness of breath.    Cardiovascular: Negative for chest pain and leg swelling.   Gastrointestinal: Positive for abdominal pain, nausea and vomiting. Negative for blood in stool, constipation and diarrhea.   Genitourinary: Negative.    All other systems reviewed and are negative.      Physical Exam   BP: 126/83  Pulse: 73  Heart Rate: 73  Temp: 96.2  F (35.7  C)  Resp: 18  Height: 157.5 cm (5' 2\")  Weight: 57.2 kg (126 lb)  SpO2: 98 %      Physical Exam   Constitutional: No distress.   HENT:   Head: Atraumatic.   Mouth/Throat: Oropharynx is clear and moist. No oropharyngeal exudate.   Eyes: Pupils are equal, round, and reactive to light. No scleral icterus.   Neck: Neck supple. No JVD present.   Cardiovascular: Normal rate, normal heart sounds and intact distal pulses.  Exam reveals no gallop and no friction rub.    No murmur heard.  Pulmonary/Chest: Effort normal and breath sounds normal. No respiratory distress. She has no wheezes. She has no rales. " She exhibits no tenderness.   Abdominal: Soft. Bowel sounds are normal. There is no hepatosplenomegaly. There is tenderness in the suprapubic area. There is no rigidity, no rebound, no guarding, no CVA tenderness, no tenderness at McBurney's point and negative Norton's sign. No hernia.       Musculoskeletal: She exhibits no edema or tenderness.   Skin: Skin is warm. No rash noted. She is not diaphoretic.       ED Course     ED Course     Procedures        Results for orders placed or performed during the hospital encounter of 12/09/17 (from the past 24 hour(s))   CBC with platelets differential     Status: Abnormal    Collection Time: 12/09/17  4:07 PM   Result Value Ref Range    WBC 10.3 4.0 - 11.0 10e9/L    RBC Count 5.18 3.8 - 5.2 10e12/L    Hemoglobin 16.7 (H) 11.7 - 15.7 g/dL    Hematocrit 47.5 (H) 35.0 - 47.0 %    MCV 92 78 - 100 fl    MCH 32.2 26.5 - 33.0 pg    MCHC 35.2 31.5 - 36.5 g/dL    RDW 13.1 10.0 - 15.0 %    Platelet Count 197 150 - 450 10e9/L    Diff Method Automated Method     % Neutrophils 77.7 %    % Lymphocytes 18.1 %    % Monocytes 3.7 %    % Eosinophils 0.2 %    % Basophils 0.2 %    % Immature Granulocytes 0.1 %    Nucleated RBCs 0 0 /100    Absolute Neutrophil 8.0 1.6 - 8.3 10e9/L    Absolute Lymphocytes 1.9 0.8 - 5.3 10e9/L    Absolute Monocytes 0.4 0.0 - 1.3 10e9/L    Absolute Eosinophils 0.0 0.0 - 0.7 10e9/L    Absolute Basophils 0.0 0.0 - 0.2 10e9/L    Abs Immature Granulocytes 0.0 0 - 0.4 10e9/L    Absolute Nucleated RBC 0.0    Comprehensive metabolic panel     Status: Abnormal    Collection Time: 12/09/17  4:07 PM   Result Value Ref Range    Sodium 140 133 - 144 mmol/L    Potassium 4.5 3.4 - 5.3 mmol/L    Chloride 107 94 - 109 mmol/L    Carbon Dioxide 20 20 - 32 mmol/L    Anion Gap 13 3 - 14 mmol/L    Glucose 152 (H) 70 - 99 mg/dL    Urea Nitrogen 7 7 - 30 mg/dL    Creatinine 0.70 0.52 - 1.04 mg/dL    GFR Estimate >90 >60 mL/min/1.7m2    GFR Estimate If Black >90 >60 mL/min/1.7m2     Calcium 10.2 (H) 8.5 - 10.1 mg/dL    Bilirubin Total 0.8 0.2 - 1.3 mg/dL    Albumin 4.7 3.4 - 5.0 g/dL    Protein Total 9.2 (H) 6.8 - 8.8 g/dL    Alkaline Phosphatase 55 40 - 150 U/L    ALT 71 (H) 0 - 50 U/L    AST 37 0 - 45 U/L   INR     Status: None    Collection Time: 12/09/17  4:07 PM   Result Value Ref Range    INR 0.99 0.86 - 1.14   UA with Microscopic     Status: Abnormal    Collection Time: 12/09/17  5:30 PM   Result Value Ref Range    Color Urine Yellow     Appearance Urine Clear     Glucose Urine Negative NEG^Negative mg/dL    Bilirubin Urine Negative NEG^Negative    Ketones Urine >150 (A) NEG^Negative mg/dL    Specific Gravity Urine 1.026 1.003 - 1.035    Blood Urine Small (A) NEG^Negative    pH Urine 8.0 (H) 5.0 - 7.0 pH    Protein Albumin Urine 30 (A) NEG^Negative mg/dL    Urobilinogen mg/dL Normal 0.0 - 2.0 mg/dL    Nitrite Urine Negative NEG^Negative    Leukocyte Esterase Urine Trace (A) NEG^Negative    Source Midstream Urine     WBC Urine 1 0 - 2 /HPF    RBC Urine 33 (H) 0 - 2 /HPF    Squamous Epithelial /HPF Urine <1 0 - 1 /HPF    Mucous Urine Present (A) NEG^Negative /LPF   hCG qual urine POCT     Status: Normal    Collection Time: 12/09/17  5:46 PM   Result Value Ref Range    HCG Qual Urine Negative neg    Internal QC OK Yes            Labs Ordered and Resulted from Time of ED Arrival Up to the Time of Departure from the ED   CBC WITH PLATELETS DIFFERENTIAL - Abnormal; Notable for the following:        Result Value    Hemoglobin 16.7 (*)     Hematocrit 47.5 (*)     All other components within normal limits   COMPREHENSIVE METABOLIC PANEL - Abnormal; Notable for the following:     Glucose 152 (*)     Calcium 10.2 (*)     Protein Total 9.2 (*)     ALT 71 (*)     All other components within normal limits   ROUTINE UA WITH MICROSCOPIC - Abnormal; Notable for the following:     Ketones Urine >150 (*)     Blood Urine Small (*)     pH Urine 8.0 (*)     Protein Albumin Urine 30 (*)     Leukocyte Esterase  Urine Trace (*)     RBC Urine 33 (*)     Mucous Urine Present (*)     All other components within normal limits   HCG QUAL URINE POCT - Normal   INR            Assessments & Plan (with Medical Decision Making)  Dysmenorrhea, improved after reglan, dilaudid, toradol, labs and ua upt neg, will DC with reglan, naproxen and ultram prn, follow up with her OB Gyn and PMD.       I have reviewed the nursing notes.    I have reviewed the findings, diagnosis, plan and need for follow up with the patient.    Discharge Medication List as of 12/9/2017  7:14 PM      START taking these medications    Details   metoclopramide (REGLAN) 5 MG tablet Take 1 tablet (5 mg) by mouth 4 times daily as needed, Disp-10 tablet, R-0, Local Print      traMADol (ULTRAM) 50 MG tablet Take 1 tablet (50 mg) by mouth nightly as needed for moderate pain, Disp-10 tablet, R-0, Local Print             Final diagnoses:   Dysmenorrhea   I, Vladimir Yan, am serving as a trained medical scribe to document services personally performed by Ginna De Souza MD, based on the provider's statements to me.      IGinna MD, was physically present and have reviewed and verified the accuracy of this note documented by Vladimir Yan.       12/9/2017   [facility], [place], EMERGENCY DEPARTMENT     Ginna De Souza MD  12/09/17 [id number]

## 2017-12-09 NOTE — ED NOTES
"ED TRIAGE    Medical / Trauma C/o:  22-yr female patient - presenting to ED via Triage; c/o abdominal pain, with N/V; since this morning; onset on cycle this morning, as well.    Duration of C/o:  This morning    Contributing Factors / Concerning HX:  See HX    Significant Med's / Tx's:  See med's    Febrile / Afebrile:  Afebrile    Patient Vitals for the past 24 hrs:   BP Temp Temp src Pulse Heart Rate Resp SpO2 Height Weight   12/09/17 1521 126/83 96.2  F (35.7  C) Oral 73 73 18 98 % 1.575 m (5' 2\") 57.2 kg (126 lb)       David Cheng  December 9, 2017  3:25 PM    "

## 2017-12-10 NOTE — PROGRESS NOTES
Emergency Social Work Services Note    Date of  Intervention: 12/09/17  Last Emergency Department Visit:  08/15/17  Care Plan:  none  Collaborated with:  ED RN, patient, chart review    Data:  María Ortiz is a 22 year old  female who presents to [facility] ED d/t abdominal pain and n/v. She has a hx of PE. ED SW consulted as patient requesting to get IUD or other birth control.     Intervention:  ED SW met with María to discuss. She is alert and oriented, appears moderately distressed as still having episodes of vomiting and anxious about d/c. María would like to either obtain a birth control pill rx (this was prescribed last admission but for some reason she was unable to transfer the rx to her local pharmacy from our pharmacy) or an IUD. She does have a PCP Dr. Ley through [facility] and notes positive relationship. She then shared she has had billing issues and outstanding co-payments > $300 which is the barrier to care. She has had many medical appointments and f/u related to PE which has resulted in many co-payments.     NATHAN provided María with our pharmacy phone number so she may follow-up tomorrow am when they open, see if able to transfer rx to her local pharmacy. Also advised her to call her PCP business office on Monday to discuss payment options, perhaps at least partial, so she may get another appointment with Dr. Ley. Suggested she talk to billing office about options for payment. Per care everywhere, María must resolve billing issue before scheduling. Lastly, NATHAN provided [facility] as a possible option if she is unable to work out payment logistics with PCP.     Assessment:  María has medical hx of PE, having difficulty with PCP follow-up d/t insurance issues.     Plan:    Anticipated Disposition:  home tonight, will f/u with outpatient providers and pharmacy    Barriers to d/c plan:  Chronic billing issues with PCP    Follow Up:  NATHAN  provided María with written resources, she verbalized understanding and will d/c to home setting tonight. She will call for an Uber ride once she is ready to d/c. NATHAN sent CTS handoff to her PCP office to request they follow-up with her given ED visit.    KERRI Jacobs, [facility]  Social Work Services, Emergency Dept [facility]  Pager: [phone] Mon-Sat 9 am - 9 pm, on-call/after hours pager [phone]

## 2017-12-10 NOTE — DISCHARGE INSTRUCTIONS
Painful Menstrual Periods (Dysmenorrhea)    Dysmenorrhea is the term used to describe painful menstrual periods.  The uterus is a muscle. Normally, chemicals called prostaglandins cause the uterus to contract during your period. The contractions push out the build-up of tissue that occurs each month inside the uterus. If the contraction is very strong, it can cause pain. The pain may feel like cramping in the lower abdomen, lower back, or thighs. In severe cases, you may have other symptoms as well. These can include nausea, vomiting, loose stools, sweating, or dizziness.  There are 2 types of dysmenorrhea:  Primary dysmenorrhea refers to common menstrual cramps. It may begin 1 or 2 years after you first get your period. It may get better or go away as you get older or when you have a baby. The cramps are most often felt just before, or on the day of your period. They may last 1 to 3 days. Treatment is with medicines and comfort measures as described below (see the  Home care  section).  Secondary dysmenorrhea may start later in life. It describes menstrual pain that occurs due to underlying health problem. The pain may last longer than common menstrual cramps. It may also worsen over time. Some problems that can lead to secondary dysmenorrhea include:     Pelvic inflammatory disease (PID): Infection that involves the female reproductive organs, such as the uterus and fallopian tubes    Fibroids: Benign growths within the wall of the uterus (not cancer)    Endometriosis: Tissue that normally only lines the uterus also grows outside of it (because the abnormal tissue also swells and bleeds each month, it can cause pain)  Once the cause of secondary dysmenorrhea is found, it can be treated. Your healthcare provider will discuss options with you as needed. Your care may also include some of the treatments described below (see the  Home care  section).  Home care  Medicines  Certain medicines can be used to help  relieve or prevent menstrual pain and cramping. These can include:    Nonsteroidal anti-inflammatory drugs (NSAIDs), such as ibuprofen    Prescription pain medicine, if needed    Hormone therapy (this includes most methods of hormonal birth control such as pills, shots, or a hormone-releasing IUD)  General care  To help relieve pain and cramping, try these tips:    Rest as needed.    Apply a heating pad to the lower belly or back as directed. A warm bath or massage to these areas may also help.    Exercise regularly. Many women find that being more active each week helps reduce pain and cramping.    Ask your healthcare provider for advice about other treatments you can try to help control pain and cramping.  Follow-up care  Follow up with your healthcare provider as advised.  When to seek medical advice  Call your healthcare provider right away if any of these occur:    Fever of 100.4 F (38 C) or higher, or as directed by your provider    Pain or cramping worsens or doesn t improve with medicine    Pain or cramping lasts longer than usual or occurs between periods    Unusual vaginal discharge between periods    Bleeding becomes heavy (soaking more than 1 pad or tampon every hour for 3 hours)    Passage of pink or gray tissue from the vagina  Date Last Reviewed: 6/11/2015 2000-2017 [employer]. [address] [place] [address], [place] [address]. All rights reserved. This information is not intended as a substitute for professional medical care. Always follow your healthcare professional's instructions.      Please make an appointment to follow up with Your OB Gyn and Your Primary Care Provider as soon as possible even if entirely better.

## 2018-01-06 ENCOUNTER — APPOINTMENT (OUTPATIENT)
Dept: ULTRASOUND IMAGING | Facility: CLINIC | Age: 23
End: 2018-01-06
Attending: NURSE PRACTITIONER
Payer: COMMERCIAL

## 2018-01-06 ENCOUNTER — HOSPITAL ENCOUNTER (OUTPATIENT)
Facility: CLINIC | Age: 23
Setting detail: OBSERVATION
Discharge: HOME | End: 2018-01-09
Attending: FAMILY MEDICINE | Admitting: FAMILY MEDICINE
Payer: COMMERCIAL

## 2018-01-06 ENCOUNTER — APPOINTMENT (OUTPATIENT)
Dept: CT IMAGING | Facility: CLINIC | Age: 23
End: 2018-01-06
Attending: NURSE PRACTITIONER
Payer: COMMERCIAL

## 2018-01-06 DIAGNOSIS — R79.89 ELEVATED LACTIC ACID LEVEL: ICD-10-CM

## 2018-01-06 DIAGNOSIS — E86.0 DEHYDRATION: ICD-10-CM

## 2018-01-06 DIAGNOSIS — I26.99 BILATERAL PULMONARY EMBOLISM (H): Primary | ICD-10-CM

## 2018-01-06 DIAGNOSIS — K52.9 GASTROENTERITIS: ICD-10-CM

## 2018-01-06 DIAGNOSIS — F41.9 ANXIETY: ICD-10-CM

## 2018-01-06 DIAGNOSIS — R33.9 URINARY RETENTION: ICD-10-CM

## 2018-01-06 LAB
ALBUMIN SERPL-MCNC: 4.3 G/DL (ref 3.4–5)
ALBUMIN UR-MCNC: 10 MG/DL
ALP SERPL-CCNC: 51 U/L (ref 40–150)
ALT SERPL W P-5'-P-CCNC: 63 U/L (ref 0–50)
ANION GAP SERPL CALCULATED.3IONS-SCNC: 14 MMOL/L (ref 3–14)
APPEARANCE UR: CLEAR
AST SERPL W P-5'-P-CCNC: 43 U/L (ref 0–45)
BASOPHILS # BLD AUTO: 0 10E9/L (ref 0–0.2)
BASOPHILS NFR BLD AUTO: 0.3 %
BILIRUB DIRECT SERPL-MCNC: 0.1 MG/DL (ref 0–0.2)
BILIRUB SERPL-MCNC: 0.5 MG/DL (ref 0.2–1.3)
BILIRUB UR QL STRIP: NEGATIVE
BUN SERPL-MCNC: 6 MG/DL (ref 7–30)
CALCIUM SERPL-MCNC: 9.6 MG/DL (ref 8.5–10.1)
CHLORIDE SERPL-SCNC: 110 MMOL/L (ref 94–109)
CO2 BLDCOV-SCNC: 20 MMOL/L (ref 21–28)
CO2 SERPL-SCNC: 20 MMOL/L (ref 20–32)
COLOR UR AUTO: YELLOW
CREAT SERPL-MCNC: 0.7 MG/DL (ref 0.52–1.04)
DIFFERENTIAL METHOD BLD: ABNORMAL
EOSINOPHIL # BLD AUTO: 0 10E9/L (ref 0–0.7)
EOSINOPHIL NFR BLD AUTO: 0.2 %
ERYTHROCYTE [DISTWIDTH] IN BLOOD BY AUTOMATED COUNT: 12.8 % (ref 10–15)
FLUAV+FLUBV AG SPEC QL: NEGATIVE
FLUAV+FLUBV AG SPEC QL: NEGATIVE
GFR SERPL CREATININE-BSD FRML MDRD: >90 ML/MIN/1.7M2
GLUCOSE SERPL-MCNC: 124 MG/DL (ref 70–99)
GLUCOSE UR STRIP-MCNC: NEGATIVE MG/DL
HCG UR QL: NEGATIVE
HCT VFR BLD AUTO: 42.7 % (ref 35–47)
HGB BLD-MCNC: 14.9 G/DL (ref 11.7–15.7)
HGB UR QL STRIP: NEGATIVE
IMM GRANULOCYTES # BLD: 0 10E9/L (ref 0–0.4)
IMM GRANULOCYTES NFR BLD: 0.2 %
KETONES UR STRIP-MCNC: 80 MG/DL
LACTATE BLD-SCNC: 2.1 MMOL/L (ref 0.7–2)
LACTATE BLD-SCNC: 2.5 MMOL/L (ref 0.7–2.1)
LACTATE BLD-SCNC: 6.8 MMOL/L (ref 0.7–2)
LEUKOCYTE ESTERASE UR QL STRIP: ABNORMAL
LIPASE SERPL-CCNC: 123 U/L (ref 73–393)
LYMPHOCYTES # BLD AUTO: 2.4 10E9/L (ref 0.8–5.3)
LYMPHOCYTES NFR BLD AUTO: 21.2 %
MAGNESIUM SERPL-MCNC: 1.8 MG/DL (ref 1.6–2.3)
MCH RBC QN AUTO: 31.8 PG (ref 26.5–33)
MCHC RBC AUTO-ENTMCNC: 34.9 G/DL (ref 31.5–36.5)
MCV RBC AUTO: 91 FL (ref 78–100)
MONOCYTES # BLD AUTO: 0.7 10E9/L (ref 0–1.3)
MONOCYTES NFR BLD AUTO: 6 %
MUCOUS THREADS #/AREA URNS LPF: PRESENT /LPF
NEUTROPHILS # BLD AUTO: 8.3 10E9/L (ref 1.6–8.3)
NEUTROPHILS NFR BLD AUTO: 72.1 %
NITRATE UR QL: NEGATIVE
NRBC # BLD AUTO: 0 10*3/UL
NRBC BLD AUTO-RTO: 0 /100
PCO2 BLDV: 26 MM HG (ref 40–50)
PH BLDV: 7.48 PH (ref 7.32–7.43)
PH UR STRIP: 8 PH (ref 5–7)
PLATELET # BLD AUTO: 204 10E9/L (ref 150–450)
PO2 BLDV: 37 MM HG (ref 25–47)
POTASSIUM SERPL-SCNC: 3.5 MMOL/L (ref 3.4–5.3)
PROT SERPL-MCNC: 8.6 G/DL (ref 6.8–8.8)
RBC # BLD AUTO: 4.68 10E12/L (ref 3.8–5.2)
RBC #/AREA URNS AUTO: 0 /HPF (ref 0–2)
SAO2 % BLDV FROM PO2: 77 %
SODIUM SERPL-SCNC: 144 MMOL/L (ref 133–144)
SOURCE: ABNORMAL
SP GR UR STRIP: 1.02 (ref 1–1.03)
SPECIMEN SOURCE: NORMAL
SQUAMOUS #/AREA URNS AUTO: 2 /HPF (ref 0–1)
UROBILINOGEN UR STRIP-MCNC: NORMAL MG/DL (ref 0–2)
WBC # BLD AUTO: 11.5 10E9/L (ref 4–11)
WBC #/AREA URNS AUTO: 7 /HPF (ref 0–2)

## 2018-01-06 PROCEDURE — 96376 TX/PRO/DX INJ SAME DRUG ADON: CPT

## 2018-01-06 PROCEDURE — 87086 URINE CULTURE/COLONY COUNT: CPT | Performed by: FAMILY MEDICINE

## 2018-01-06 PROCEDURE — 83605 ASSAY OF LACTIC ACID: CPT | Performed by: NURSE PRACTITIONER

## 2018-01-06 PROCEDURE — 81001 URINALYSIS AUTO W/SCOPE: CPT | Performed by: FAMILY MEDICINE

## 2018-01-06 PROCEDURE — 80076 HEPATIC FUNCTION PANEL: CPT | Performed by: FAMILY MEDICINE

## 2018-01-06 PROCEDURE — 25000125 ZZHC RX 250: Performed by: STUDENT IN AN ORGANIZED HEALTH CARE EDUCATION/TRAINING PROGRAM

## 2018-01-06 PROCEDURE — 83690 ASSAY OF LIPASE: CPT | Performed by: FAMILY MEDICINE

## 2018-01-06 PROCEDURE — 87804 INFLUENZA ASSAY W/OPTIC: CPT | Performed by: FAMILY MEDICINE

## 2018-01-06 PROCEDURE — 25000128 H RX IP 250 OP 636: Performed by: NURSE PRACTITIONER

## 2018-01-06 PROCEDURE — 85025 COMPLETE CBC W/AUTO DIFF WBC: CPT | Performed by: FAMILY MEDICINE

## 2018-01-06 PROCEDURE — 25000128 H RX IP 250 OP 636: Performed by: STUDENT IN AN ORGANIZED HEALTH CARE EDUCATION/TRAINING PROGRAM

## 2018-01-06 PROCEDURE — 96375 TX/PRO/DX INJ NEW DRUG ADDON: CPT

## 2018-01-06 PROCEDURE — 81025 URINE PREGNANCY TEST: CPT | Performed by: FAMILY MEDICINE

## 2018-01-06 PROCEDURE — 25000125 ZZHC RX 250: Performed by: FAMILY MEDICINE

## 2018-01-06 PROCEDURE — 83735 ASSAY OF MAGNESIUM: CPT | Performed by: PHYSICIAN ASSISTANT

## 2018-01-06 PROCEDURE — 25000125 ZZHC RX 250: Performed by: PHYSICIAN ASSISTANT

## 2018-01-06 PROCEDURE — 99220 ZZC INITIAL OBSERVATION CARE,LEVL III: CPT | Mod: Z6 | Performed by: NURSE PRACTITIONER

## 2018-01-06 PROCEDURE — 80048 BASIC METABOLIC PNL TOTAL CA: CPT | Performed by: FAMILY MEDICINE

## 2018-01-06 PROCEDURE — 74177 CT ABD & PELVIS W/CONTRAST: CPT

## 2018-01-06 PROCEDURE — 96374 THER/PROPH/DIAG INJ IV PUSH: CPT

## 2018-01-06 PROCEDURE — 83735 ASSAY OF MAGNESIUM: CPT | Performed by: FAMILY MEDICINE

## 2018-01-06 PROCEDURE — 76705 ECHO EXAM OF ABDOMEN: CPT

## 2018-01-06 PROCEDURE — 99285 EMERGENCY DEPT VISIT HI MDM: CPT | Mod: 25

## 2018-01-06 PROCEDURE — 83605 ASSAY OF LACTIC ACID: CPT

## 2018-01-06 PROCEDURE — 82803 BLOOD GASES ANY COMBINATION: CPT

## 2018-01-06 PROCEDURE — 96361 HYDRATE IV INFUSION ADD-ON: CPT | Mod: 59

## 2018-01-06 PROCEDURE — 83605 ASSAY OF LACTIC ACID: CPT | Performed by: FAMILY MEDICINE

## 2018-01-06 PROCEDURE — G0378 HOSPITAL OBSERVATION PER HR: HCPCS

## 2018-01-06 PROCEDURE — 25000128 H RX IP 250 OP 636

## 2018-01-06 PROCEDURE — 25000128 H RX IP 250 OP 636: Performed by: FAMILY MEDICINE

## 2018-01-06 PROCEDURE — 36415 COLL VENOUS BLD VENIPUNCTURE: CPT | Performed by: NURSE PRACTITIONER

## 2018-01-06 RX ORDER — IOPAMIDOL 755 MG/ML
74 INJECTION, SOLUTION INTRAVASCULAR ONCE
Status: COMPLETED | OUTPATIENT
Start: 2018-01-06 | End: 2018-01-06

## 2018-01-06 RX ORDER — DICYCLOMINE HYDROCHLORIDE 10 MG/1
10-20 CAPSULE ORAL 2 TIMES DAILY PRN
Status: DISCONTINUED | OUTPATIENT
Start: 2018-01-06 | End: 2018-01-09 | Stop reason: HOSPADM

## 2018-01-06 RX ORDER — HYDROMORPHONE HCL/0.9% NACL/PF 0.2MG/0.2
0.2 SYRINGE (ML) INTRAVENOUS
Status: DISCONTINUED | OUTPATIENT
Start: 2018-01-06 | End: 2018-01-06

## 2018-01-06 RX ORDER — ONDANSETRON 4 MG/1
4 TABLET, ORALLY DISINTEGRATING ORAL EVERY 6 HOURS PRN
Status: DISCONTINUED | OUTPATIENT
Start: 2018-01-06 | End: 2018-01-09 | Stop reason: HOSPADM

## 2018-01-06 RX ORDER — HYDROXYZINE HYDROCHLORIDE 25 MG/1
25 TABLET, FILM COATED ORAL EVERY 4 HOURS PRN
Status: DISCONTINUED | OUTPATIENT
Start: 2018-01-06 | End: 2018-01-09 | Stop reason: HOSPADM

## 2018-01-06 RX ORDER — NALOXONE HYDROCHLORIDE 0.4 MG/ML
.1-.4 INJECTION, SOLUTION INTRAMUSCULAR; INTRAVENOUS; SUBCUTANEOUS
Status: DISCONTINUED | OUTPATIENT
Start: 2018-01-06 | End: 2018-01-09 | Stop reason: HOSPADM

## 2018-01-06 RX ORDER — ONDANSETRON 2 MG/ML
4 INJECTION INTRAMUSCULAR; INTRAVENOUS ONCE
Status: COMPLETED | OUTPATIENT
Start: 2018-01-06 | End: 2018-01-06

## 2018-01-06 RX ORDER — OMEPRAZOLE 40 MG/1
40 CAPSULE, DELAYED RELEASE ORAL DAILY
COMMUNITY

## 2018-01-06 RX ORDER — DICYCLOMINE HYDROCHLORIDE 10 MG/1
10-20 CAPSULE ORAL 2 TIMES DAILY PRN
COMMUNITY

## 2018-01-06 RX ORDER — NALOXONE HYDROCHLORIDE 0.4 MG/ML
.1-.4 INJECTION, SOLUTION INTRAMUSCULAR; INTRAVENOUS; SUBCUTANEOUS
Status: DISCONTINUED | OUTPATIENT
Start: 2018-01-06 | End: 2018-01-08

## 2018-01-06 RX ORDER — HYDROMORPHONE HYDROCHLORIDE 1 MG/ML
INJECTION, SOLUTION INTRAMUSCULAR; INTRAVENOUS; SUBCUTANEOUS
Status: COMPLETED
Start: 2018-01-06 | End: 2018-01-06

## 2018-01-06 RX ORDER — ACETAMINOPHEN 325 MG/1
325-650 TABLET ORAL EVERY 6 HOURS PRN
COMMUNITY

## 2018-01-06 RX ORDER — ONDANSETRON 2 MG/ML
4 INJECTION INTRAMUSCULAR; INTRAVENOUS EVERY 6 HOURS PRN
Status: DISCONTINUED | OUTPATIENT
Start: 2018-01-06 | End: 2018-01-09 | Stop reason: HOSPADM

## 2018-01-06 RX ORDER — PROCHLORPERAZINE 25 MG
25 SUPPOSITORY, RECTAL RECTAL EVERY 12 HOURS PRN
Status: DISCONTINUED | OUTPATIENT
Start: 2018-01-06 | End: 2018-01-09 | Stop reason: HOSPADM

## 2018-01-06 RX ORDER — SODIUM CHLORIDE 9 MG/ML
INJECTION, SOLUTION INTRAVENOUS CONTINUOUS
Status: DISCONTINUED | OUTPATIENT
Start: 2018-01-06 | End: 2018-01-08

## 2018-01-06 RX ORDER — ESCITALOPRAM OXALATE 10 MG/1
20 TABLET ORAL DAILY
Status: DISCONTINUED | OUTPATIENT
Start: 2018-01-07 | End: 2018-01-09 | Stop reason: HOSPADM

## 2018-01-06 RX ORDER — ACETAMINOPHEN AND CODEINE PHOSPHATE 120; 12 MG/5ML; MG/5ML
1 SOLUTION ORAL DAILY
Status: DISCONTINUED | OUTPATIENT
Start: 2018-01-06 | End: 2018-01-09 | Stop reason: HOSPADM

## 2018-01-06 RX ORDER — HYDROMORPHONE HCL/0.9% NACL/PF 0.2MG/0.2
0.2 SYRINGE (ML) INTRAVENOUS
Status: DISCONTINUED | OUTPATIENT
Start: 2018-01-06 | End: 2018-01-08

## 2018-01-06 RX ORDER — PROMETHAZINE HYDROCHLORIDE 25 MG/ML
12.5 INJECTION, SOLUTION INTRAMUSCULAR; INTRAVENOUS ONCE
Status: COMPLETED | OUTPATIENT
Start: 2018-01-06 | End: 2018-01-06

## 2018-01-06 RX ORDER — HYDROMORPHONE HCL/0.9% NACL/PF 0.2MG/0.2
0.2 SYRINGE (ML) INTRAVENOUS ONCE
Status: COMPLETED | OUTPATIENT
Start: 2018-01-06 | End: 2018-01-06

## 2018-01-06 RX ORDER — LORAZEPAM 2 MG/ML
0.5 INJECTION INTRAMUSCULAR ONCE
Status: COMPLETED | OUTPATIENT
Start: 2018-01-06 | End: 2018-01-06

## 2018-01-06 RX ORDER — ONDANSETRON 4 MG/1
4 TABLET, ORALLY DISINTEGRATING ORAL EVERY 8 HOURS PRN
Status: ON HOLD | COMMUNITY
End: 2018-01-09

## 2018-01-06 RX ORDER — POLYETHYLENE GLYCOL 3350 17 G/17G
17 POWDER, FOR SOLUTION ORAL DAILY PRN
Status: DISCONTINUED | OUTPATIENT
Start: 2018-01-06 | End: 2018-01-09 | Stop reason: HOSPADM

## 2018-01-06 RX ORDER — PROCHLORPERAZINE MALEATE 5 MG
10 TABLET ORAL EVERY 6 HOURS PRN
Status: DISCONTINUED | OUTPATIENT
Start: 2018-01-06 | End: 2018-01-09 | Stop reason: HOSPADM

## 2018-01-06 RX ORDER — LIDOCAINE 40 MG/G
CREAM TOPICAL
Status: DISCONTINUED | OUTPATIENT
Start: 2018-01-06 | End: 2018-01-09 | Stop reason: HOSPADM

## 2018-01-06 RX ORDER — ACETAMINOPHEN 500 MG
1000 TABLET ORAL EVERY 6 HOURS PRN
Status: DISCONTINUED | OUTPATIENT
Start: 2018-01-06 | End: 2018-01-09 | Stop reason: HOSPADM

## 2018-01-06 RX ORDER — DICYCLOMINE HYDROCHLORIDE 10 MG/1
10-20 CAPSULE ORAL 2 TIMES DAILY PRN
Status: DISCONTINUED | OUTPATIENT
Start: 2018-01-06 | End: 2018-01-06

## 2018-01-06 RX ORDER — ACETAMINOPHEN 325 MG/1
650 TABLET ORAL EVERY 4 HOURS PRN
Status: DISCONTINUED | OUTPATIENT
Start: 2018-01-06 | End: 2018-01-06

## 2018-01-06 RX ADMIN — SODIUM CHLORIDE 1000 ML: 900 INJECTION, SOLUTION INTRAVENOUS at 11:35

## 2018-01-06 RX ADMIN — ONDANSETRON 4 MG: 2 INJECTION INTRAMUSCULAR; INTRAVENOUS at 09:35

## 2018-01-06 RX ADMIN — Medication 0.2 MG: at 20:00

## 2018-01-06 RX ADMIN — PANTOPRAZOLE SODIUM 40 MG: 40 INJECTION, POWDER, FOR SOLUTION INTRAVENOUS at 22:37

## 2018-01-06 RX ADMIN — LORAZEPAM 0.5 MG: 2 INJECTION INTRAMUSCULAR; INTRAVENOUS at 10:24

## 2018-01-06 RX ADMIN — ONDANSETRON 4 MG: 2 INJECTION INTRAMUSCULAR; INTRAVENOUS at 23:38

## 2018-01-06 RX ADMIN — ONDANSETRON 4 MG: 2 INJECTION INTRAMUSCULAR; INTRAVENOUS at 15:50

## 2018-01-06 RX ADMIN — Medication 0.2 MG: at 10:27

## 2018-01-06 RX ADMIN — PANTOPRAZOLE SODIUM 40 MG: 40 INJECTION, POWDER, FOR SOLUTION INTRAVENOUS at 10:29

## 2018-01-06 RX ADMIN — SODIUM CHLORIDE 1000 ML: 9 INJECTION, SOLUTION INTRAVENOUS at 09:35

## 2018-01-06 RX ADMIN — Medication 0.2 MG: at 17:40

## 2018-01-06 RX ADMIN — Medication 2 G: at 22:37

## 2018-01-06 RX ADMIN — IOPAMIDOL 74 ML: 755 INJECTION, SOLUTION INTRAVENOUS at 16:34

## 2018-01-06 RX ADMIN — Medication 0.2 MG: at 23:38

## 2018-01-06 RX ADMIN — Medication 0.2 MG: at 12:52

## 2018-01-06 RX ADMIN — ONDANSETRON 4 MG: 2 INJECTION INTRAMUSCULAR; INTRAVENOUS at 09:50

## 2018-01-06 RX ADMIN — Medication 0.2 MG: at 15:56

## 2018-01-06 RX ADMIN — SODIUM CHLORIDE, PRESERVATIVE FREE 69 ML: 5 INJECTION INTRAVENOUS at 16:34

## 2018-01-06 RX ADMIN — PROMETHAZINE HYDROCHLORIDE 12.5 MG: 25 INJECTION INTRAMUSCULAR; INTRAVENOUS at 12:50

## 2018-01-06 ASSESSMENT — ENCOUNTER SYMPTOMS
SHORTNESS OF BREATH: 0
APPETITE CHANGE: 1
DIAPHORESIS: 1
TROUBLE SWALLOWING: 0
ACTIVITY CHANGE: 1
WEAKNESS: 1
NECK STIFFNESS: 0
DIFFICULTY URINATING: 0
SINUS PRESSURE: 0
ARTHRALGIAS: 0
SINUS PAIN: 0
ABDOMINAL PAIN: 1
HEADACHES: 0
DECREASED CONCENTRATION: 1
DIARRHEA: 1
VOMITING: 1
FLANK PAIN: 0
CHEST TIGHTNESS: 0
FATIGUE: 1
COLOR CHANGE: 0
BACK PAIN: 0
NAUSEA: 1
DYSPHORIC MOOD: 1
EYE REDNESS: 0
LIGHT-HEADEDNESS: 1
COUGH: 0
CONFUSION: 0
FEVER: 0

## 2018-01-06 ASSESSMENT — ACTIVITIES OF DAILY LIVING (ADL)
TRANSFERRING: 0-->INDEPENDENT
RETIRED_COMMUNICATION: 0-->UNDERSTANDS/COMMUNICATES WITHOUT DIFFICULTY
SWALLOWING: 0-->SWALLOWS FOODS/LIQUIDS WITHOUT DIFFICULTY
DRESS: 0-->INDEPENDENT
BATHING: 0-->INDEPENDENT
COGNITION: 0 - NO COGNITION ISSUES REPORTED
AMBULATION: 0-->INDEPENDENT
RETIRED_EATING: 0-->INDEPENDENT
FALL_HISTORY_WITHIN_LAST_SIX_MONTHS: NO
TOILETING: 0-->INDEPENDENT

## 2018-01-06 ASSESSMENT — PAIN DESCRIPTION - DESCRIPTORS
DESCRIPTORS: TIGHTNESS
DESCRIPTORS: DISCOMFORT

## 2018-01-06 NOTE — ED PROVIDER NOTES
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  Patient states that she started having nausea, vomiting and diarrhea this morning.  She reports that she developed abdominal pain that she describes to be all over.  She denies anyone being sick around her at home.  Patient denies vomiting any blood.  Patient denies any recent antibiotics.  No previous history of similar symptoms.  No blood in stool.  Patient describes stomach pain primarily with nausea vomiting but no focal complaints.  No fevers no coughing no chest pain.    I have reviewed the Medications, Allergies, Past Medical and Surgical History, and Social History in the [facility] system.    Past Medical History:   Diagnosis Date     Anxiety      Depressive disorder      IBS (irritable bowel syndrome)      Pulmonary emboli (H)        Past Surgical History:   Procedure Laterality Date     ORTHOPEDIC SURGERY      R shoulder 2012       No family history on file.    Social History   Substance Use Topics     Smoking status: Never Smoker     Smokeless tobacco: Never Used     Alcohol use Yes      Comment: social       Current Facility-Administered Medications   Medication     HYDROmorphone (DILAUDID) injection 0.2 mg     naloxone (NARCAN) injection 0.1-0.4 mg     dicyclomine (BENTYL) capsule 10-20 mg     [START ON 1/7/2018] escitalopram (LEXAPRO) tablet 20 mg     hydrOXYzine (ATARAX) tablet 25 mg     norethindrone (MICRONOR) 0.35 MG per tablet 0.35 mg     polyethylene glycol (MIRALAX/GLYCOLAX) Packet 17 g     lidocaine 1 % 1 mL     lidocaine (LMX4) kit     sodium chloride (PF) 0.9% PF flush 3 mL     sodium chloride (PF) 0.9% PF flush 3 mL     naloxone (NARCAN) injection 0.1-0.4 mg      "ondansetron (ZOFRAN-ODT) ODT tab 4 mg    Or     ondansetron (ZOFRAN) injection 4 mg     prochlorperazine (COMPAZINE) injection 10 mg    Or     prochlorperazine (COMPAZINE) tablet 10 mg    Or     prochlorperazine (COMPAZINE) Suppository 25 mg     acetaminophen (TYLENOL) tablet 650 mg     0.9% sodium chloride infusion     [START ON 1/7/2018] pantoprazole (PROTONIX) 40 mg IV push injection      No Known Allergies     Review of Systems   Constitutional: Positive for activity change, appetite change, diaphoresis and fatigue. Negative for fever.   HENT: Negative for congestion, sinus pain, sinus pressure and trouble swallowing.    Eyes: Negative for redness and visual disturbance.   Respiratory: Negative for cough, chest tightness and shortness of breath.    Cardiovascular: Negative for chest pain and leg swelling.   Gastrointestinal: Positive for abdominal pain, diarrhea, nausea and vomiting.   Genitourinary: Negative for difficulty urinating and flank pain.   Musculoskeletal: Negative for arthralgias, back pain and neck stiffness.   Skin: Negative for color change and rash.   Allergic/Immunologic: Negative for immunocompromised state.   Neurological: Positive for weakness and light-headedness. Negative for headaches.   Psychiatric/Behavioral: Positive for decreased concentration and dysphoric mood. Negative for confusion.   All other systems reviewed and are negative.      Physical Exam   BP: 141/86  Pulse: 62  Temp: 97.8  F (36.6  C)  Resp: 20  Height: 165.1 cm (5' 5\")  Weight: 58.5 kg (129 lb)  SpO2: 99 %      Physical Exam   Constitutional: She is oriented to person, place, and time. She appears well-developed and well-nourished. She appears distressed.   Marked distress.  Patient actively vomiting but no hematemesis.  Some bilious emesis noted.   HENT:   Head: Normocephalic and atraumatic.   Dry mucous membranes   Eyes: EOM are normal. Pupils are equal, round, and reactive to light. No scleral icterus.   No Scleral " icterus   Neck: Normal range of motion. Neck supple. No JVD present.   Cardiovascular: Regular rhythm.    Pulmonary/Chest: No stridor. No respiratory distress. She has no wheezes.   Abdominal: She exhibits no distension and no mass. There is tenderness. There is no rebound and no guarding.   It is soft generalized midabdominal tenderness negative Norton sign at this point.  No rigidity   Musculoskeletal: She exhibits no edema, tenderness or deformity.   Neurological: She is alert and oriented to person, place, and time. She has normal reflexes. No cranial nerve deficit. Coordination normal.   Skin: Skin is warm and dry. No rash noted. She is not diaphoretic. No erythema. No pallor.   Psychiatric:   Is agitated here in the ER uncomfortable because of the acute nausea vomiting   Nursing note and vitals reviewed.      ED Course   10:00 AM  The patient was seen and examined by Dr. Clark in Room 18.    ED Course     Patient evaluated the ER and IV was established.  IV fluids given liberally in the ER.  Patient received Zofran initially ×2 IV.  Still nausea vomiting.  Patient very agitated still.  Patient received 1/2 mg Ativan followed with 0.2 mg of Dilaudid IV with marked improvement of symptoms.  Laboratory testing initially revealed a elevated lactic acid 6.8.  Liver function tests.  Normal slight elevation in ALT lipase normal at 23.  Chemistries otherwise normal with a normal anion gap.  Bicarb was 20.  Glucose 124.  Potassium 3.5.  White count 11.5 heme globin 14.9.  Urinalysis reveals 7 white cells.  Negative pregnancy test.  Influenza testing negative.  After 2 L of fluid patient repeat lactic acid 2.5.  Patient feeling somewhat better had recurrent symptoms.  Phenergan IV given.  Repeat Dilaudid 0.2 mg IV ×1.  Patient somewhat better.  Will admit to ED observation for ongoing evaluation.  Patient agrees with plan and stable in the ER.      Procedures             Critical Care time:  none     Elevated Lactate of  6.8 and no sepsis note found        Labs Ordered and Resulted from Time of ED Arrival Up to the Time of Departure from the ED   CBC WITH PLATELETS DIFFERENTIAL - Abnormal; Notable for the following:        Result Value    WBC 11.5 (*)     All other components within normal limits   BASIC METABOLIC PANEL - Abnormal; Notable for the following:     Chloride 110 (*)     Glucose 124 (*)     Urea Nitrogen 6 (*)     All other components within normal limits   LACTIC ACID WHOLE BLOOD - Abnormal; Notable for the following:     Lactic Acid 6.8 (*)     All other components within normal limits   UA MACROSCOPIC WITH REFLEX TO MICRO AND CULTURE - Abnormal; Notable for the following:     Ketones Urine 80 (*)     pH Urine 8.0 (*)     Protein Albumin Urine 10 (*)     Leukocyte Esterase Urine Moderate (*)     WBC Urine 7 (*)     Squamous Epithelial /HPF Urine 2 (*)     Mucous Urine Present (*)     All other components within normal limits   HEPATIC PANEL - Abnormal; Notable for the following:     ALT 63 (*)     All other components within normal limits   ISTAT  GASES LACTATE ARGENIS POCT - Abnormal; Notable for the following:     Ph Venous 7.48 (*)     PCO2 Venous 26 (*)     Bicarbonate Venous 20 (*)     Lactic Acid 2.5 (*)     All other components within normal limits   HCG QUALITATIVE URINE   LIPASE   ISTAT CG4 GASES LACTATE ARGENIS NURSING POCT   INFLUENZA A/B ANTIGEN   ENTERIC BACTERIA AND VIRUS PANEL BY MALIHA STOOL   CLOSTRIDIUM DIFFICILE TOXIN B                           CBC with platelets differential   Result Value Ref Range    WBC 11.5 (H) 4.0 - 11.0 10e9/L    RBC Count 4.68 3.8 - 5.2 10e12/L    Hemoglobin 14.9 11.7 - 15.7 g/dL    Hematocrit 42.7 35.0 - 47.0 %    MCV 91 78 - 100 fl    MCH 31.8 26.5 - 33.0 pg    MCHC 34.9 31.5 - 36.5 g/dL    RDW 12.8 10.0 - 15.0 %    Platelet Count 204 150 - 450 10e9/L    Diff Method Automated Method     % Neutrophils 72.1 %    % Lymphocytes 21.2 %    % Monocytes 6.0 %    % Eosinophils 0.2 %    %  Basophils 0.3 %    % Immature Granulocytes 0.2 %    Nucleated RBCs 0 0 /100    Absolute Neutrophil 8.3 1.6 - 8.3 10e9/L    Absolute Lymphocytes 2.4 0.8 - 5.3 10e9/L    Absolute Monocytes 0.7 0.0 - 1.3 10e9/L    Absolute Eosinophils 0.0 0.0 - 0.7 10e9/L    Absolute Basophils 0.0 0.0 - 0.2 10e9/L    Abs Immature Granulocytes 0.0 0 - 0.4 10e9/L    Absolute Nucleated RBC 0.0    Basic metabolic panel   Result Value Ref Range    Sodium 144 133 - 144 mmol/L    Potassium 3.5 3.4 - 5.3 mmol/L    Chloride 110 (H) 94 - 109 mmol/L    Carbon Dioxide 20 20 - 32 mmol/L    Anion Gap 14 3 - 14 mmol/L    Glucose 124 (H) 70 - 99 mg/dL    Urea Nitrogen 6 (L) 7 - 30 mg/dL    Creatinine 0.70 0.52 - 1.04 mg/dL    GFR Estimate >90 >60 mL/min/1.7m2    GFR Estimate If Black >90 >60 mL/min/1.7m2    Calcium 9.6 8.5 - 10.1 mg/dL   Lactic acid whole blood   Result Value Ref Range    Lactic Acid 6.8 (HH) 0.7 - 2.0 mmol/L   UA reflex to Microscopic and Culture   Result Value Ref Range    Color Urine Yellow     Appearance Urine Clear     Glucose Urine Negative NEG^Negative mg/dL    Bilirubin Urine Negative NEG^Negative    Ketones Urine 80 (A) NEG^Negative mg/dL    Specific Gravity Urine 1.016 1.003 - 1.035    Blood Urine Negative NEG^Negative    pH Urine 8.0 (H) 5.0 - 7.0 pH    Protein Albumin Urine 10 (A) NEG^Negative mg/dL    Urobilinogen mg/dL Normal 0.0 - 2.0 mg/dL    Nitrite Urine Negative NEG^Negative    Leukocyte Esterase Urine Moderate (A) NEG^Negative    Source Midstream Urine     RBC Urine 0 0 - 2 /HPF    WBC Urine 7 (H) 0 - 2 /HPF    Squamous Epithelial /HPF Urine 2 (H) 0 - 1 /HPF    Mucous Urine Present (A) NEG^Negative /LPF   HCG qualitative urine   Result Value Ref Range    HCG Qual Urine Negative NEG^Negative   Hepatic panel   Result Value Ref Range    Bilirubin Direct 0.1 0.0 - 0.2 mg/dL    Bilirubin Total 0.5 0.2 - 1.3 mg/dL    Albumin 4.3 3.4 - 5.0 g/dL    Protein Total 8.6 6.8 - 8.8 g/dL    Alkaline Phosphatase 51 40 - 150 U/L     ALT 63 (H) 0 - 50 U/L    AST 43 0 - 45 U/L   Lipase   Result Value Ref Range    Lipase 123 73 - 393 U/L   Lactic acid whole blood   Result Value Ref Range    Lactic Acid 2.1 (H) 0.7 - 2.0 mmol/L   ISTAT gases lactate irene POCT   Result Value Ref Range    Ph Venous 7.48 (H) 7.32 - 7.43 pH    PCO2 Venous 26 (L) 40 - 50 mm Hg    PO2 Venous 37 25 - 47 mm Hg    Bicarbonate Venous 20 (L) 21 - 28 mmol/L    O2 Sat Venous 77 %    Lactic Acid 2.5 (H) 0.7 - 2.1 mmol/L   Influenza A/B antigen   Result Value Ref Range    Influenza A/B Agn Specimen Nasopharyngeal     Influenza A Negative NEG^Negative    Influenza B Negative NEG^Negative   Urine Culture Aerobic Bacterial   Result Value Ref Range    Specimen Description Unspecified Urine     Special Requests Specimen received in preservative     Culture Micro PENDING             Assessments & Plan (with Medical Decision Making)  22-year-old female who is on Xarelto for history of PEs presents with acute nausea vomiting diarrhea.  Patient abdomen out acute focal findings negative Norton sign abdomen soft.  Patient with bilious emesis no sign of bleeding.  No recent antibiotics.  No previous history or ill contacts.  Patient treated with IV fluids marked elevated lactic acidosis improved with IV fluids in the ER.  Patient without acute surgical abdomen will be admitted to ED observation for ongoing hydration and symptomatic control of symptoms.  Will trend lactic acid etc.  Patient agrees with plan.  Patient is not pregnant.  More likely symptoms are acute gastroenteritis.  Admit ED obs.       I have reviewed the nursing notes.    I have reviewed the findings, diagnosis, plan and need for follow up with the patient.    Current Discharge Medication List          Final diagnoses:   Gastroenteritis   Dehydration   Elevated lactic acid level   I, Maurisio Hyatt, am serving as a trained medical scribe to document services personally performed by Anup Clark MD, based on the  provider's statements to me.   I, Anup Clark MD, was physically present and have reviewed and verified the accuracy of this note documented by Maurisio Hyatt.     1/6/2018   [facility], [facility] EMERGENCY DEPARTMENT      This note was created at least in part by the use of dragon voice dictation system. Inadvertent typographical errors may still exist.  Anup Clark MD.         Anup Clark MD  01/06/18 1911

## 2018-01-06 NOTE — LETTER
Transition Communication Hand-off for Care Transitions to Next Level of Care Provider    Name: María Ortiz  MRN #: [id number]  Primary Care Provider: Lindsay Ley     Primary Clinic: [address] [address]     Reason for Hospitalization:  Gastroenteritis [K52.9]  Admit Date/Time: 1/6/2018  9:15 AM  Discharge Date: 01/09/18  Payor Source: Payor: MEDICA / Plan: MEDICA ESSENTIAL / Product Type: Indemnity /          Reason for Communication Hand-off Referral: Multiple providers/specialties  Other Hospitalization    Discharge Plan:       Concern for non-adherence with plan of care:   Y/N Yes  Discharge Needs Assessment: Home with outpatient follow up/care coordination (see AVS)    Follow-up specialty is recommended: Yes    Follow-up plan:  No future appointments.    Any outstanding tests or procedures:              Key Recommendations:  Outpatient follow up and care coordination    Nicole Beauchamp RN CC    AVS/Discharge Summary is the source of truth; this is a helpful guide for improved communication of patient story

## 2018-01-06 NOTE — LETTER
January 9, 2018      María Ortiz  [address] [facility] [address]        To Whom It May Concern,     María Ortiz was hospitalized and under my care from Jan 6-9, 2018. She may return to work on Thursday, 1/11/18, please allow her to self limit activity and rest as needed.     If you have questions or concerns, please call the unit and ask for the on call provider at the number listed above.      Sincerely,         Zamzam Mixon PA-C

## 2018-01-06 NOTE — IP AVS SNAPSHOT
Unit 6D Observation [address] [address]    Phone:  [phone]    Fax:  [phone]                                       After Visit Summary   1/6/2018    María Ortiz    MRN: [id number]           After Visit Summary Signature Page     I have received my discharge instructions, and my questions have been answered. I have discussed any challenges I see with this plan with the nurse or doctor.    ..........................................................................................................................................  Patient/Patient Representative Signature      ..........................................................................................................................................  Patient Representative Print Name and Relationship to Patient    ..................................................               ................................................  Date                                            Time    ..........................................................................................................................................  Reviewed by Signature/Title    ...................................................              ..............................................  Date                                                            Time

## 2018-01-06 NOTE — H&P
"ED OBSERVATION HISTORY & PHYSICAL    Admission Date: 01/06/2018   Attending Physician: Dr. Amber MD  NP/PA: Norma Cohen CNP    REASON FOR ADMISSION:   Chief Complaint   Patient presents with     Nausea, Vomiting, & Diarrhea     Abdominal Pain         HPI:    Per ED MD, \"María Ortiz is a 22 year old female with a past medical history of bilateral pulmonary embolism (on Xarelto) and IBS who presents to the Emergency Department today for evaluation of abdominal pain, vomiting, and diarrhea.  Patient states that she started having nausea, vomiting and diarrhea this morning.  She reports that she developed abdominal pain that she describes to be all over.  She denies anyone being sick around her at home.  Patient denies vomiting any blood.\"    In the ED her vitals were stable. Labs showed lactic acid of 6.8, improved to 2.5 with hydration. WBC 11.5. She was given IV antiemetics and IV Dilaudid. She is admitted to observation for gastroenteritis.    On admission to the observation unit the patient was stable. She notes ongoing umbilical abdominal pain. Does not radiate anywhere. + nausea. No vomiting currently.     ROS:    CONSTITUTIONAL:  Denies fever.  SKIN: Denies rash  EYES: Denies visual changes  EARS/NOSE/THROAT: Denies sinus pressure/drainage, PND, nasal congestion, runny nose, epistaxis, sore throat/mouth pain,   RESPIRATORY: Denies dyspnea at rest or with activity, cough, or hemoptysis.  CARDIOVASCULAR: Denies palpitations, chest pain/pressure, edema or open areas on extremities.  GASTROINTESTINAL:  Denies dysphagia, constipation, or diarrhea.  GENITOURINARY: Denies complaints  MUSCULOSKELTAL: Denies muscle/joint pain and weakness  NEUROLOGIC: Denies headaches memory changes, lightheadedness/dizziness or difficulties with balance.  PSYCHIATRIC: Denies change in mood.  HEME/LYMPH: Denies active bleeding  VASCULAR ACCESS: Denies pain, redness, or discharge.    ROS negative other than the symptoms noted " above.    History:    Past Medical History:   Diagnosis Date     Anxiety      Depressive disorder      IBS (irritable bowel syndrome)      Pulmonary emboli (H)        Past Surgical History:   Procedure Laterality Date     ORTHOPEDIC SURGERY      R shoulder 2012       No family history on file.    Social History     Social History     Marital status: Single     Spouse name: N/A     Number of children: N/A     Years of education: N/A     Occupational History     Not on file.     Social History Main Topics     Smoking status: Never Smoker     Smokeless tobacco: Never Used     Alcohol use Yes      Comment: social     Drug use: Not on file     Sexual activity: Not on file     Other Topics Concern     Not on file     Social History Narrative         No current facility-administered medications on file prior to encounter.   Current Outpatient Prescriptions on File Prior to Encounter:  rivaroxaban ANTICOAGULANT (XARELTO) 15 MG TABS tablet Take 1 tablet (15 mg) by mouth 2 times daily (with meals) Take 15mg two times daily for 21 days. There after take 20mg daily.   metoclopramide (REGLAN) 5 MG tablet Take 1 tablet (5 mg) by mouth 4 times daily as needed   naproxen (NAPROSYN) 500 MG tablet Take 1 tablet (500 mg) by mouth 2 times daily as needed for moderate pain   traMADol (ULTRAM) 50 MG tablet Take 1 tablet (50 mg) by mouth nightly as needed for moderate pain   oxyCODONE (ROXICODONE) 5 MG IR tablet Take 1 tablet (5 mg) by mouth every 3 hours as needed for severe pain 8/19 (up to 8 pills total in one day, with each one being 3 hours apart or longer between doses)8/20 ( up to 7 pillsin one day, with each one being 3 hours apart or longer between doses)8/21 (up to 6 pills in one day, with each one being 3 hours apart or longer between doses)8/22 (up to 5 pills in one day, with each one being 3 hours apart or longer between doses)   escitalopram (LEXAPRO) 20 MG tablet Take 1 tablet (20 mg) by mouth daily   hydrOXYzine (ATARAX)  "25 MG tablet Take 1 tablet (25 mg) by mouth every 4 hours as needed for anxiety   norethindrone (MICRONOR) 0.35 MG per tablet Take 1 tablet (0.35 mg) by mouth daily   cyclobenzaprine (FLEXERIL) 10 MG tablet Take 1 tablet (10 mg) by mouth 3 times daily as needed for muscle spasms   diclofenac (VOLTAREN) 1 % GEL topical gel Place 2 g onto the skin 4 times daily Apply to areas of pain 4 times daily as needed   acetaminophen (TYLENOL) 500 MG tablet Take 2 tablets (1,000 mg) by mouth 3 times daily   polyethylene glycol (MIRALAX/GLYCOLAX) Packet Take 17 g by mouth as needed for constipation   senna-docusate (SENOKOT-S;PERICOLACE) 8.6-50 MG per tablet Take 1 tablet by mouth 2 times daily as needed for constipation   OMEPRAZOLE PO Take 40 mg by mouth every morning   DICYCLOMINE HCL PO Take 10-20 mg by mouth 2 times daily as needed    Ondansetron (ZOFRAN ODT PO) Take 4 mg by mouth every 8 hours as needed for nausea   albuterol (PROAIR HFA/PROVENTIL HFA/VENTOLIN HFA) 108 (90 BASE) MCG/ACT Inhaler Inhale 2 puffs into the lungs every 4 hours as needed for shortness of breath / dyspnea or wheezing   Multiple Vitamins-Minerals (WOMENS MULTIVITAMIN PLUS PO) Take 2 tablets by mouth daily       Exam:  Vital signs:  Temp: 97.7  F (36.5  C) Temp src: Oral BP: (!) 152/92 Pulse: 51   Resp: 16 SpO2: 100 % O2 Device: None (Room air)   Height: 157.5 cm (5' 2\") Weight: 54.9 kg (121 lb)  Estimated body mass index is 22.13 kg/(m^2) as calculated from the following:    Height as of this encounter: 1.575 m (5' 2\").    Weight as of this encounter: 54.9 kg (121 lb).    All vital signs were reviewed.  GENERAL APPEARENCE: A/O x4. NAD.  SKIN: Clean, dry, and intact without visible lesions, rash, jaundice, cyanosis, erythema, ecchymoses to exposed areas.  HEENT: NCAT w/out masses, lesions, or abnormalities. Sclera anicteric, PERRLA, EOMI.  Oral mucosa pink and moist without erythema, exudate, lesions, ulcerations, or thrush. Teeth and gums normal.  "   NECK: Supple, no masses. No jugular venous distention.   CARDIOVASCULAR: S1, S2 RRR. No murmurs, rubs, or gallops.   RESPIRATORY: Respiratory effort WNL. CTA  bilaterally without crackles/rales/wheeze   GI: Active BS in all 4 quadrants. Abdomen soft, TTP umbilicus.  No masses or hepatosplenomegaly.  : Deferred  MUSCULOSKELETAL: Extremities normal, no gross deformities noted, non-tender to palpation.   PV: 2+ bilateral radial and pedal pulses. No edema noted.   NEURO: CN II-XII grossly intact. Speech normal. Appropriate throughout interview.   PSYCHIATRIC: Mentation and affect appear normal  VASCULAR ACCESS: CDI without erythema or discharge. Non-tender.    Data:    Results for orders placed or performed during the hospital encounter of 01/06/18   CBC with platelets differential   Result Value Ref Range    WBC 11.5 (H) 4.0 - 11.0 10e9/L    RBC Count 4.68 3.8 - 5.2 10e12/L    Hemoglobin 14.9 11.7 - 15.7 g/dL    Hematocrit 42.7 35.0 - 47.0 %    MCV 91 78 - 100 fl    MCH 31.8 26.5 - 33.0 pg    MCHC 34.9 31.5 - 36.5 g/dL    RDW 12.8 10.0 - 15.0 %    Platelet Count 204 150 - 450 10e9/L    Diff Method Automated Method     % Neutrophils 72.1 %    % Lymphocytes 21.2 %    % Monocytes 6.0 %    % Eosinophils 0.2 %    % Basophils 0.3 %    % Immature Granulocytes 0.2 %    Nucleated RBCs 0 0 /100    Absolute Neutrophil 8.3 1.6 - 8.3 10e9/L    Absolute Lymphocytes 2.4 0.8 - 5.3 10e9/L    Absolute Monocytes 0.7 0.0 - 1.3 10e9/L    Absolute Eosinophils 0.0 0.0 - 0.7 10e9/L    Absolute Basophils 0.0 0.0 - 0.2 10e9/L    Abs Immature Granulocytes 0.0 0 - 0.4 10e9/L    Absolute Nucleated RBC 0.0    Basic metabolic panel   Result Value Ref Range    Sodium 144 133 - 144 mmol/L    Potassium 3.5 3.4 - 5.3 mmol/L    Chloride 110 (H) 94 - 109 mmol/L    Carbon Dioxide 20 20 - 32 mmol/L    Anion Gap 14 3 - 14 mmol/L    Glucose 124 (H) 70 - 99 mg/dL    Urea Nitrogen 6 (L) 7 - 30 mg/dL    Creatinine 0.70 0.52 - 1.04 mg/dL    GFR Estimate >90  >60 mL/min/1.7m2    GFR Estimate If Black >90 >60 mL/min/1.7m2    Calcium 9.6 8.5 - 10.1 mg/dL   Lactic acid whole blood   Result Value Ref Range    Lactic Acid 6.8 (HH) 0.7 - 2.0 mmol/L   UA reflex to Microscopic and Culture   Result Value Ref Range    Color Urine Yellow     Appearance Urine Clear     Glucose Urine Negative NEG^Negative mg/dL    Bilirubin Urine Negative NEG^Negative    Ketones Urine 80 (A) NEG^Negative mg/dL    Specific Gravity Urine 1.016 1.003 - 1.035    Blood Urine Negative NEG^Negative    pH Urine 8.0 (H) 5.0 - 7.0 pH    Protein Albumin Urine 10 (A) NEG^Negative mg/dL    Urobilinogen mg/dL Normal 0.0 - 2.0 mg/dL    Nitrite Urine Negative NEG^Negative    Leukocyte Esterase Urine Moderate (A) NEG^Negative    Source Midstream Urine     RBC Urine 0 0 - 2 /HPF    WBC Urine 7 (H) 0 - 2 /HPF    Squamous Epithelial /HPF Urine 2 (H) 0 - 1 /HPF    Mucous Urine Present (A) NEG^Negative /LPF   HCG qualitative urine   Result Value Ref Range    HCG Qual Urine Negative NEG^Negative   Hepatic panel   Result Value Ref Range    Bilirubin Direct 0.1 0.0 - 0.2 mg/dL    Bilirubin Total 0.5 0.2 - 1.3 mg/dL    Albumin 4.3 3.4 - 5.0 g/dL    Protein Total 8.6 6.8 - 8.8 g/dL    Alkaline Phosphatase 51 40 - 150 U/L    ALT 63 (H) 0 - 50 U/L    AST 43 0 - 45 U/L   Lipase   Result Value Ref Range    Lipase 123 73 - 393 U/L   ISTAT gases lactate irene POCT   Result Value Ref Range    Ph Venous 7.48 (H) 7.32 - 7.43 pH    PCO2 Venous 26 (L) 40 - 50 mm Hg    PO2 Venous 37 25 - 47 mm Hg    Bicarbonate Venous 20 (L) 21 - 28 mmol/L    O2 Sat Venous 77 %    Lactic Acid 2.5 (H) 0.7 - 2.1 mmol/L   Influenza A/B antigen   Result Value Ref Range    Influenza A/B Agn Specimen Nasopharyngeal     Influenza A Negative NEG^Negative    Influenza B Negative NEG^Negative   Urine Culture Aerobic Bacterial   Result Value Ref Range    Specimen Description Unspecified Urine     Special Requests Specimen received in preservative     Culture Micro  "PENDING                     Assessment/Plan:  Per ED MD, \"María Ortiz is a 22 year old female with a past medical history of bilateral pulmonary embolism (on Xarelto) and IBS who presents to the Emergency Department today for evaluation of abdominal pain, vomiting, and diarrhea.     1. Abdominal pain, nausea, vomiting, and diarrhea: stated this am. Lactic acid initially 6.7, improved with hydration. HD stable here. Persistent severe abdominal pain and nausea. Admit to observation for management of abdominal pain. Could be gastroenteritis. However, concerning given significantly elevated lactic acid and ongoing symptoms.   -IVF  -Recheck lactic acid  -ADAT  -Pain management with IV Dilaudid until tolerating PO intake  -IV antiemetics  -CT A/P given elevated lactic acid and persistent symptoms   -Stool for C. Diff and culture     2. Pulmonary Embolism: HOLD home xeralto until CT complete in the event intervention is indicated, resume if CT OK.     3. Depression, Anxiety: Resume home atarax and Lexapro       FEN:  -NPO  -Monitor BMP and replace electrolytes per protocol    Prophy:  -xeralto   -Encourage ambulation as tolerated   -PPI for GI prophy  -PRN  Miralax  Consults: N/A     CODE STATUS:  FULL CODE       DISPOSITION: Pending work-up and improved symptoms.       LIZANDRO Johns, CNP  Nurse Practitioner   Emergency Department Observation Unit      Addendum 1730: CT shows possible cholecystitis.   -RUQ US  -Continue to hold Xeralto  -Surgery consult if US +    LIZANDRO Johns, CNP  Emergency Department Observation Unit      "

## 2018-01-06 NOTE — IP AVS SNAPSHOT
MRN:[id number]                      After Visit Summary   1/6/2018    María Ortiz    MRN: [id number]           Thank you!     Thank you for choosing [facility] for your care. Our goal is always to provide you with excellent care. Hearing back from our patients is one way we can continue to improve our services. Please take a few minutes to complete the written survey that you may receive in the mail after you visit with us. Thank you!        Patient Information     Date Of Birth          1995        Designated Caregiver       Most Recent Value    Caregiver    Will someone help with your care after discharge? no      About your hospital stay     You were admitted on:  January 6, 2018 You last received care in the:  Unit 6D Observation [facility]    You were discharged on:  January 9, 2018        Reason for your hospital stay       You were hospitalized due to abdominal pain, nausea, vomiting and diarrhea. Labs initially elevated, improved with hydration. Stool studies negative for c-difficile, giardia. Urine culture not suggestive of infection. Imagine did show extra fluid near the liver, Cardiology was consulted and felt no intervention needed at this time. You did have difficulty with urine retention, possibly related to Atarax, please limit use going forward. Small quantity of flomax may help with further urine retention. Small quantity of ativan prescribed as alternate to atarax, please follow up with primary care to review further anxiety management.                  Who to Call     For medical emergencies, please call [phone].  For non-urgent questions about your medical care, please call your primary care provider or clinic, [phone]          Attending Provider     Provider Specialty    Anup Clark MD Emergency Medicine    Ginna De Souza MD Internal Medicine       Primary Care Provider Office Phone # Fax #    Lindsay Ley [phone]       When to contact  "your care team       Call your primary doctor if you have any of the following: temperature greater than 100.4F, increased shortness of breath, increased nausea, increased abdominal pain or further difficulty with urination.                  After Care Instructions     Activity       Your activity upon discharge: activity as tolerated            Diet       Follow this diet upon discharge: Regular diet                  Follow-up Appointments     Follow Up and recommended labs and tests       Follow up with primary care provider, Lindsay Ley, within 7 days for hospital follow-up.  Repeat LFTs at this visit.                  Pending Results     No orders found from 1/4/2018 to 1/7/2018.            Statement of Approval     Ordered          01/09/18 1545  I have reviewed and agree with all the recommendations and orders detailed in this document.  EFFECTIVE NOW     Approved and electronically signed by:  Zamzam Mixon PA-C             Admission Information     Date & Time Provider Department Dept. Phone    1/6/2018 Ginna De Souza MD Unit 6D Observation [facility] [place] [phone]      Your Vitals Were     Blood Pressure Pulse Temperature Respirations Height Weight    122/84 (BP Location: Left arm) 56 98.7  F (37.1  C) (Oral) 15 1.575 m (5' 2\") 56.2 kg (124 lb)    Pulse Oximetry BMI (Body Mass Index)                96% 22.68 kg/m2          [employer] Information     [employer] lets you send messages to your doctor, view your test results, renew your prescriptions, schedule appointments and more. To sign up, go to [email or web address] . Click on \"Log in\" on the left side of the screen, which will take you to the Welcome page. Then click on \"Sign up Now\" on the right side of the page.     You will be asked to enter the access code listed below, as well as some personal information. Please follow the directions to create your username and password.     Your access code is: [email or web address]  Expires: 3/9/2018  5:53 " PM     Your access code will  in 90 days. If you need help or a new code, please call your [facility] clinic or [phone].        Care EveryWhere ID     This is your Care EveryWhere ID. This could be used by other organizations to access your [facility] medical records  [id number]        Equal Access to Services     JACQUE MCGUIRE : Hadii malena ku hadasho Soomaali, waaxda luqadaha, qaybta kaalmada adebradlyyada, juan diego allisonrosegurvinder salvador. So [facility] [phone].    ATENCIÓN: Si habla español, tiene a reardon disposición servicios gratuitos de asistencia lingüística. Llame al [phone].    We comply with applicable federal civil rights laws and [place] laws. We do not discriminate on the basis of race, color, national origin, age, disability, sex, sexual orientation, or gender identity.               Review of your medicines      START taking        Dose / Directions    LORazepam 0.5 MG tablet   Commonly known as:  ATIVAN   Used for:  Anxiety        Dose:  0.5 mg   Take 1 tablet (0.5 mg) by mouth every 8 hours as needed for anxiety   Quantity:  20 tablet   Refills:  0       tamsulosin 0.4 MG capsule   Commonly known as:  FLOMAX   Used for:  Urinary retention        Dose:  0.4 mg   Start taking on:  1/10/2018   Take 1 capsule (0.4 mg) by mouth daily   Quantity:  5 capsule   Refills:  0         CONTINUE these medicines which have NOT CHANGED        Dose / Directions    acetaminophen 325 MG tablet   Commonly known as:  TYLENOL        Dose:  325-650 mg   Take 325-650 mg by mouth every 6 hours as needed for mild pain   Refills:  0       albuterol 108 (90 BASE) MCG/ACT Inhaler   Commonly known as:  PROAIR HFA/PROVENTIL HFA/VENTOLIN HFA        Dose:  2 puff   Inhale 2 puffs into the lungs every 4 hours as needed for shortness of breath / dyspnea or wheezing   Refills:  0       dicyclomine 10 MG capsule   Commonly known as:  BENTYL        Dose:  10-20 mg   Take 10-20 mg by mouth 2 times daily as needed    Refills:  0       escitalopram 20 MG tablet   Commonly known as:  LEXAPRO   Used for:  Anxiety        Dose:  20 mg   Take 1 tablet (20 mg) by mouth daily   Quantity:  30 tablet   Refills:  0       norethindrone 0.35 MG per tablet   Commonly known as:  MICRONOR   Used for:  Encounter for initial prescription of contraceptive pills        Dose:  1 tablet   Take 1 tablet (0.35 mg) by mouth daily   Quantity:  28 tablet   Refills:  1       omeprazole 40 MG capsule   Commonly known as:  priLOSEC        Dose:  40 mg   Take 40 mg by mouth daily   Refills:  0       ondansetron 4 MG ODT tab   Commonly known as:  ZOFRAN-ODT        Dose:  4 mg   Take 1 tablet (4 mg) by mouth every 8 hours as needed for nausea   Quantity:  30 tablet   Refills:  0       rivaroxaban ANTICOAGULANT 20 MG Tabs tablet   Commonly known as:  XARELTO   Used for:  Bilateral pulmonary embolism (H)        Dose:  20 mg   Take 1 tablet (20 mg) by mouth daily (with dinner)   Quantity:  30 tablet   Refills:  0       senna-docusate 8.6-50 MG per tablet   Commonly known as:  SENOKOT-S;PERICOLACE   Used for:  Drug-induced constipation        Dose:  1 tablet   Take 1 tablet by mouth 2 times daily as needed for constipation   Quantity:  10 tablet   Refills:  0         STOP taking     hydrOXYzine 25 MG tablet   Commonly known as:  ATARAX                Where to get your medicines      These medications were sent to [facility] [place], [place] - [address] [place] [address]     Phone:  [phone]     rivaroxaban ANTICOAGULANT 20 MG Tabs tablet    tamsulosin 0.4 MG capsule         Some of these will need a paper prescription and others can be bought over the counter. Ask your nurse if you have questions.     Bring a paper prescription for each of these medications     LORazepam 0.5 MG tablet    ondansetron 4 MG ODT tab                Protect others around you: Learn how to safely use, store and throw away your  medicines at www.disposemymeds.org.             Medication List: This is a list of all your medications and when to take them. Check marks below indicate your daily home schedule. Keep this list as a reference.      Medications           Morning Afternoon Evening Bedtime As Needed    acetaminophen 325 MG tablet   Commonly known as:  TYLENOL   Take 325-650 mg by mouth every 6 hours as needed for mild pain   Last time this was given:  1,000 mg on 1/9/2018  4:43 AM                                albuterol 108 (90 BASE) MCG/ACT Inhaler   Commonly known as:  PROAIR HFA/PROVENTIL HFA/VENTOLIN HFA   Inhale 2 puffs into the lungs every 4 hours as needed for shortness of breath / dyspnea or wheezing                                dicyclomine 10 MG capsule   Commonly known as:  BENTYL   Take 10-20 mg by mouth 2 times daily as needed                                escitalopram 20 MG tablet   Commonly known as:  LEXAPRO   Take 1 tablet (20 mg) by mouth daily   Last time this was given:  20 mg on 1/9/2018  8:35 AM                                LORazepam 0.5 MG tablet   Commonly known as:  ATIVAN   Take 1 tablet (0.5 mg) by mouth every 8 hours as needed for anxiety                                norethindrone 0.35 MG per tablet   Commonly known as:  MICRONOR   Take 1 tablet (0.35 mg) by mouth daily   Last time this was given:  0.35 mg on 1/9/2018  8:37 AM                                omeprazole 40 MG capsule   Commonly known as:  priLOSEC   Take 40 mg by mouth daily                                ondansetron 4 MG ODT tab   Commonly known as:  ZOFRAN-ODT   Take 1 tablet (4 mg) by mouth every 8 hours as needed for nausea   Last time this was given:  4 mg on 1/9/2018  4:45 AM                                rivaroxaban ANTICOAGULANT 20 MG Tabs tablet   Commonly known as:  XARELTO   Take 1 tablet (20 mg) by mouth daily (with dinner)   Last time this was given:  20 mg on 1/8/2018  4:54 PM                                 senna-docusate 8.6-50 MG per tablet   Commonly known as:  SENOKOT-S;PERICOLACE   Take 1 tablet by mouth 2 times daily as needed for constipation                                tamsulosin 0.4 MG capsule   Commonly known as:  FLOMAX   Take 1 capsule (0.4 mg) by mouth daily   Start taking on:  1/10/2018   Last time this was given:  0.4 mg on 1/9/2018  2:36 PM

## 2018-01-07 ENCOUNTER — APPOINTMENT (OUTPATIENT)
Dept: ULTRASOUND IMAGING | Facility: CLINIC | Age: 23
End: 2018-01-07
Attending: NURSE PRACTITIONER
Payer: COMMERCIAL

## 2018-01-07 ENCOUNTER — APPOINTMENT (OUTPATIENT)
Dept: CARDIOLOGY | Facility: CLINIC | Age: 23
End: 2018-01-07
Attending: NURSE PRACTITIONER
Payer: COMMERCIAL

## 2018-01-07 ENCOUNTER — APPOINTMENT (OUTPATIENT)
Dept: CT IMAGING | Facility: CLINIC | Age: 23
End: 2018-01-07
Attending: NURSE PRACTITIONER
Payer: COMMERCIAL

## 2018-01-07 LAB
ALBUMIN SERPL-MCNC: 3 G/DL (ref 3.4–5)
ALP SERPL-CCNC: 36 U/L (ref 40–150)
ALT SERPL W P-5'-P-CCNC: 48 U/L (ref 0–50)
ANION GAP SERPL CALCULATED.3IONS-SCNC: 6 MMOL/L (ref 3–14)
AST SERPL W P-5'-P-CCNC: 24 U/L (ref 0–45)
BACTERIA SPEC CULT: NORMAL
BASOPHILS # BLD AUTO: 0 10E9/L (ref 0–0.2)
BASOPHILS NFR BLD AUTO: 0.2 %
BILIRUB SERPL-MCNC: 0.8 MG/DL (ref 0.2–1.3)
BUN SERPL-MCNC: 5 MG/DL (ref 7–30)
CALCIUM SERPL-MCNC: 8 MG/DL (ref 8.5–10.1)
CHLORIDE SERPL-SCNC: 110 MMOL/L (ref 94–109)
CO2 SERPL-SCNC: 24 MMOL/L (ref 20–32)
CREAT SERPL-MCNC: 0.68 MG/DL (ref 0.52–1.04)
DIFFERENTIAL METHOD BLD: ABNORMAL
EOSINOPHIL # BLD AUTO: 0 10E9/L (ref 0–0.7)
EOSINOPHIL NFR BLD AUTO: 0.3 %
ERYTHROCYTE [DISTWIDTH] IN BLOOD BY AUTOMATED COUNT: 13 % (ref 10–15)
GFR SERPL CREATININE-BSD FRML MDRD: >90 ML/MIN/1.7M2
GLUCOSE SERPL-MCNC: 82 MG/DL (ref 70–99)
HCT VFR BLD AUTO: 34.5 % (ref 35–47)
HGB BLD-MCNC: 11.6 G/DL (ref 11.7–15.7)
IMM GRANULOCYTES # BLD: 0 10E9/L (ref 0–0.4)
IMM GRANULOCYTES NFR BLD: 0.2 %
LACTATE BLD-SCNC: 0.8 MMOL/L (ref 0.7–2)
LYMPHOCYTES # BLD AUTO: 2.8 10E9/L (ref 0.8–5.3)
LYMPHOCYTES NFR BLD AUTO: 25.7 %
Lab: NORMAL
MAGNESIUM SERPL-MCNC: 2.2 MG/DL (ref 1.6–2.3)
MCH RBC QN AUTO: 31 PG (ref 26.5–33)
MCHC RBC AUTO-ENTMCNC: 33.6 G/DL (ref 31.5–36.5)
MCV RBC AUTO: 92 FL (ref 78–100)
MONOCYTES # BLD AUTO: 0.8 10E9/L (ref 0–1.3)
MONOCYTES NFR BLD AUTO: 7.5 %
NEUTROPHILS # BLD AUTO: 7.3 10E9/L (ref 1.6–8.3)
NEUTROPHILS NFR BLD AUTO: 66.1 %
NRBC # BLD AUTO: 0 10*3/UL
NRBC BLD AUTO-RTO: 0 /100
PHOSPHATE SERPL-MCNC: 2.4 MG/DL (ref 2.5–4.5)
PLATELET # BLD AUTO: 141 10E9/L (ref 150–450)
POTASSIUM SERPL-SCNC: 3.7 MMOL/L (ref 3.4–5.3)
PROT SERPL-MCNC: 5.9 G/DL (ref 6.8–8.8)
RBC # BLD AUTO: 3.74 10E12/L (ref 3.8–5.2)
SODIUM SERPL-SCNC: 140 MMOL/L (ref 133–144)
SPECIMEN SOURCE: NORMAL
WBC # BLD AUTO: 11 10E9/L (ref 4–11)

## 2018-01-07 PROCEDURE — 93975 VASCULAR STUDY: CPT | Mod: TC

## 2018-01-07 PROCEDURE — G0378 HOSPITAL OBSERVATION PER HR: HCPCS

## 2018-01-07 PROCEDURE — 36415 COLL VENOUS BLD VENIPUNCTURE: CPT | Performed by: PHYSICIAN ASSISTANT

## 2018-01-07 PROCEDURE — 99225 ZZC SUBSEQUENT OBSERVATION CARE,LEVEL II: CPT | Mod: Z6 | Performed by: FAMILY MEDICINE

## 2018-01-07 PROCEDURE — 25000125 ZZHC RX 250: Performed by: STUDENT IN AN ORGANIZED HEALTH CARE EDUCATION/TRAINING PROGRAM

## 2018-01-07 PROCEDURE — 71260 CT THORAX DX C+: CPT

## 2018-01-07 PROCEDURE — 83605 ASSAY OF LACTIC ACID: CPT | Performed by: PHYSICIAN ASSISTANT

## 2018-01-07 PROCEDURE — 25000125 ZZHC RX 250: Performed by: PHYSICIAN ASSISTANT

## 2018-01-07 PROCEDURE — 83735 ASSAY OF MAGNESIUM: CPT | Performed by: PHYSICIAN ASSISTANT

## 2018-01-07 PROCEDURE — 25000128 H RX IP 250 OP 636: Performed by: NURSE PRACTITIONER

## 2018-01-07 PROCEDURE — 25000125 ZZHC RX 250: Performed by: NURSE PRACTITIONER

## 2018-01-07 PROCEDURE — 80053 COMPREHEN METABOLIC PANEL: CPT | Performed by: PHYSICIAN ASSISTANT

## 2018-01-07 PROCEDURE — 25000128 H RX IP 250 OP 636: Performed by: PHYSICIAN ASSISTANT

## 2018-01-07 PROCEDURE — 25000132 ZZH RX MED GY IP 250 OP 250 PS 637: Performed by: PHYSICIAN ASSISTANT

## 2018-01-07 PROCEDURE — 93306 TTE W/DOPPLER COMPLETE: CPT | Mod: 26 | Performed by: INTERNAL MEDICINE

## 2018-01-07 PROCEDURE — 96376 TX/PRO/DX INJ SAME DRUG ADON: CPT

## 2018-01-07 PROCEDURE — 93306 TTE W/DOPPLER COMPLETE: CPT

## 2018-01-07 PROCEDURE — 84100 ASSAY OF PHOSPHORUS: CPT | Performed by: PHYSICIAN ASSISTANT

## 2018-01-07 PROCEDURE — 25000132 ZZH RX MED GY IP 250 OP 250 PS 637: Performed by: NURSE PRACTITIONER

## 2018-01-07 PROCEDURE — 85025 COMPLETE CBC W/AUTO DIFF WBC: CPT | Performed by: PHYSICIAN ASSISTANT

## 2018-01-07 PROCEDURE — 25000128 H RX IP 250 OP 636: Performed by: STUDENT IN AN ORGANIZED HEALTH CARE EDUCATION/TRAINING PROGRAM

## 2018-01-07 RX ORDER — IOPAMIDOL 755 MG/ML
52 INJECTION, SOLUTION INTRAVASCULAR ONCE
Status: COMPLETED | OUTPATIENT
Start: 2018-01-07 | End: 2018-01-07

## 2018-01-07 RX ADMIN — PROCHLORPERAZINE MALEATE 10 MG: 5 TABLET, FILM COATED ORAL at 23:26

## 2018-01-07 RX ADMIN — SODIUM CHLORIDE, PRESERVATIVE FREE 83 ML: 5 INJECTION INTRAVENOUS at 15:14

## 2018-01-07 RX ADMIN — SODIUM CHLORIDE: 9 INJECTION, SOLUTION INTRAVENOUS at 17:47

## 2018-01-07 RX ADMIN — PANTOPRAZOLE SODIUM 40 MG: 40 INJECTION, POWDER, FOR SOLUTION INTRAVENOUS at 21:33

## 2018-01-07 RX ADMIN — ESCITALOPRAM OXALATE 20 MG: 10 TABLET ORAL at 10:23

## 2018-01-07 RX ADMIN — Medication 0.2 MG: at 17:47

## 2018-01-07 RX ADMIN — RIVAROXABAN 20 MG: 20 TABLET, FILM COATED ORAL at 17:17

## 2018-01-07 RX ADMIN — Medication 0.2 MG: at 06:23

## 2018-01-07 RX ADMIN — SODIUM CHLORIDE: 9 INJECTION, SOLUTION INTRAVENOUS at 08:37

## 2018-01-07 RX ADMIN — Medication 0.2 MG: at 10:28

## 2018-01-07 RX ADMIN — HYDROXYZINE HYDROCHLORIDE 25 MG: 25 TABLET ORAL at 12:15

## 2018-01-07 RX ADMIN — PANTOPRAZOLE SODIUM 40 MG: 40 INJECTION, POWDER, FOR SOLUTION INTRAVENOUS at 08:40

## 2018-01-07 RX ADMIN — SODIUM CHLORIDE: 9 INJECTION, SOLUTION INTRAVENOUS at 01:09

## 2018-01-07 RX ADMIN — Medication 0.2 MG: at 21:40

## 2018-01-07 RX ADMIN — ACETAMINOPHEN 1000 MG: 500 TABLET, FILM COATED ORAL at 23:31

## 2018-01-07 RX ADMIN — ONDANSETRON 4 MG: 4 TABLET, ORALLY DISINTEGRATING ORAL at 10:24

## 2018-01-07 RX ADMIN — IOPAMIDOL 52 ML: 755 INJECTION, SOLUTION INTRAVENOUS at 15:13

## 2018-01-07 RX ADMIN — ONDANSETRON 4 MG: 4 TABLET, ORALLY DISINTEGRATING ORAL at 19:24

## 2018-01-07 RX ADMIN — Medication 0.2 MG: at 03:26

## 2018-01-07 ASSESSMENT — PAIN DESCRIPTION - DESCRIPTORS
DESCRIPTORS: DULL;ACHING;SHARP
DESCRIPTORS: ACHING
DESCRIPTORS: ACHING

## 2018-01-07 NOTE — PROGRESS NOTES
SPIRITUAL HEALTH SERVICES  SPIRITUAL ASSESSMENT Progress Note   Hospital Location 6D  ON-CALL    Referral Source: hospital  request when admitted     Introduced María to the use of a breath paryer to help with her anxiety.     PLAN: no follow up needed at this time.                                                                                                                                               Bharath Sierra MA.  Associate Staff   Pager [phone]

## 2018-01-07 NOTE — CONSULTS
GASTROENTEROLOGY CONSULTATION      Date of Admission:  1/6/2018          ASSESSMENT AND RECOMMENDATIONS:   Assessment:  22 year old female with a history of bilateral pulmonary embolism (?related to her estrogen containing OCPs) [on Xarelto but has been unable to take it for the last two weeks d/t insurance issues] and hx of IBS (EGD and colonoscopy in august last year was normal as per the patient) who has now been admitted under the medicine service for acute worsening of abdominal pain, nausea, vomiting and diarrhea. Since her admission labs showed significant lactic acidosis, a CT abdomen was done which showed mild hepatomegaly with periprotal edema and dilated IVC and hepatic veins (?congestion from RHF) and also showed an area of heterogeneous hepatic perfusion in the left lobe of the liver. She has normal LFTs. Hepatology team has been consulted to comment on the abnormal CT scan findings.  At this time, I believe that prominent IVC and hepatic veins along with mild hepatomegaly and some periportal edema is likely related to some degree of right sided heart failure from her known B/L extensive PE.     Recommendations  - Echocardiogram and pulm/ cardiology consultation to assist in management of any heart failure symptoms.  - RUQ U/S with doppler to rule out portal/hepatic vein thrombosis (low threshold, not visualized on CT but would need to be excluded in this presentation)  - Her abdominal pain, nausea, vomiting and diarrhea are likely 2/2 viral/bacterial gastroenteritis (some enhancing jejunal loops of bowel were seen on the CT). Conservative management at this time with complete recovery expected in 5-7 days. If symptoms persist, she can follow up with her outpatient gastroenterologist.    Gastroenterology outpatient follow up recommendations: To be determined  Thank you for involving us in this patient's care. Please do not hesitate to contact the GI service with any questions or concerns.   Pt care  plan discussed with Dr. Ramires, GI staff physician.    Cole Chavez  -------------------------------------------------------------------------------------------------------------------          Chief Complaint:   We were asked by Dr. Clark of medicine team to evaluate this patient with abnormal liver on CT scan.  History is obtained from the patient and the medical record.          History of Present Illness:   María Ortiz is a 22 year old female with a history of bilateral pulmonary embolism (?related to her estrogen containing OCPs) [on Xarelto but has been unable to take it for the last two weeks d/t insurance issues] and hx of IBS (EGD and colonoscopy in august last year was normal as per the patient) who has now been admitted under the medicine service for acute worsening of abdominal pain, nausea, vomiting and diarrhea. Since her admission labs showed significant lactic acidosis, a CT abdomen was done which showed mild hepatomegaly with periprotal edema and dilated IVC and hepatic veins (?congestion from RHF) and also showed an area of heterogeneous hepatic perfusion in the left lobe of the liver. She has normal LFTs. Hepatology team has been consulted to comment on the abnormal CT scan findings.    According to the patient, she has a long-standing hx of abdominal symptoms that predominantly include pain and diarrhea which has been worked up by a gastroenterologists with recent colonoscopy and EGD that was normal. Her symptoms have been attributed to IBS. Saturday morning, she developed abdominal pain which was diffuse (worse in the upper quadrants). This morning, she started feeling nauseous and had several episodes of vomiting along with diarrhea (loose, non-bloody, water). Denies any sick contacts but did have pizza late on Friday. In the ED, she was HDS and afebrile but was noticed to have lactic acidosis with a lacate of 6.8 (improved to 2s with IV Fluid rehydration) and mild leukocytosis with a WBC  count of 11.5. She was treated with IV fluid resuscitation, anti-emetic and opioids for her symptoms and admitted for observation. CT abdomen done (d/t significant lactic acidosis) revealed mild hepatomegaly with periprotal edema and dilated IVC and hepatic veins (?congestion from RHF) and also showed an area of heterogeneous hepatic perfusion in the left lobe of the liver. U/S showed nonspecific gallbladder wall thickening measuring up to 6 mm without evidence of cholelithiasis or additional findings to suggest cholecystitis.  She reports improvement in her symptoms since yesterday. Also endorsed not taking her rivoroxaban over the last 2 weeks d/t insurance issues.            Past Medical History:   Reviewed and edited as appropriate  Past Medical History:   Diagnosis Date     Anxiety      Depressive disorder      IBS (irritable bowel syndrome)      Pulmonary emboli (H)             Past Surgical History:   Reviewed and edited as appropriate   Past Surgical History:   Procedure Laterality Date     ORTHOPEDIC SURGERY      R shoulder 2012            Previous Endoscopy:   No results found for this or any previous visit.         Social History:   Reviewed and edited as appropriate  Social History     Social History     Marital status: Single     Spouse name: N/A     Number of children: N/A     Years of education: N/A     Occupational History     Not on file.     Social History Main Topics     Smoking status: Never Smoker     Smokeless tobacco: Never Used     Alcohol use Yes      Comment: social     Drug use: Not on file     Sexual activity: Not on file     Other Topics Concern     Not on file     Social History Narrative            Family History:   Reviewed and edited as appropriate  No known history of colorectal cancer, liver disease, or inflammatory bowel disease.         Allergies:   Reviewed and edited as appropriate   No Known Allergies         Medications:     Current Facility-Administered Medications  "  Medication     rivaroxaban ANTICOAGULANT (XARELTO) tablet 20 mg     naloxone (NARCAN) injection 0.1-0.4 mg     escitalopram (LEXAPRO) tablet 20 mg     hydrOXYzine (ATARAX) tablet 25 mg     norethindrone (MICRONOR) 0.35 MG per tablet 0.35 mg     polyethylene glycol (MIRALAX/GLYCOLAX) Packet 17 g     lidocaine 1 % 1 mL     lidocaine (LMX4) kit     sodium chloride (PF) 0.9% PF flush 3 mL     sodium chloride (PF) 0.9% PF flush 3 mL     naloxone (NARCAN) injection 0.1-0.4 mg     ondansetron (ZOFRAN-ODT) ODT tab 4 mg    Or     ondansetron (ZOFRAN) injection 4 mg     prochlorperazine (COMPAZINE) injection 10 mg    Or     prochlorperazine (COMPAZINE) tablet 10 mg    Or     prochlorperazine (COMPAZINE) Suppository 25 mg     0.9% sodium chloride infusion     pantoprazole (PROTONIX) 40 mg IV push injection     dicyclomine (BENTYL) capsule 10-20 mg     HYDROmorphone (DILAUDID) injection 0.2 mg     acetaminophen (TYLENOL) tablet 1,000 mg             Review of Systems:   A complete review of systems was performed and is negative except as noted in the HPI           Physical Exam:   /67 (BP Location: Left arm)  Pulse 81  Temp 98.3  F (36.8  C) (Oral)  Resp 16  Ht 1.575 m (5' 2\")  Wt 56.2 kg (124 lb)  LMP 12/09/2017 (Exact Date)  SpO2 98%  Breastfeeding? No  BMI 22.68 kg/m2  Wt:   Wt Readings from Last 2 Encounters:   01/07/18 56.2 kg (124 lb)   12/09/17 57.2 kg (126 lb)      Constitutional: cooperative, pleasant, not dyspneic/diaphoretic, no acute distress  Eyes: Sclera anicteric/injected  Ears/nose/mouth/throat: Normal oropharynx without ulcers or exudate, mucus membranes moist, hearing intact  Neck: supple, thyroid normal size, unable to appreciate any JVD.  CV: No LE edema  Respiratory: Unlabored breathing  Lymph: No axillary, submandibular, supraclavicular or inguinal lymphadenopathy  Abd:  Nondistended, +bs, no hepatosplenomegaly, Mildly tender diffusely, no peritoneal signs  Skin: warm, perfused, no " jaundice  Neuro: AAO x 3, No asterixis  Psych: Normal affect  MSK: No gross deformities         Data:   Labs and imaging below were independently reviewed and interpreted    BMP  Recent Labs  Lab 01/07/18  0640 01/06/18  0932    144   POTASSIUM 3.7 3.5   CHLORIDE 110* 110*   STALIN 8.0* 9.6   CO2 24 20   BUN 5* 6*   CR 0.68 0.70   GLC 82 124*     CBC  Recent Labs  Lab 01/07/18  0640 01/06/18  0932   WBC 11.0 11.5*   RBC 3.74* 4.68   HGB 11.6* 14.9   HCT 34.5* 42.7   MCV 92 91   MCH 31.0 31.8   MCHC 33.6 34.9   RDW 13.0 12.8   * 204     INRNo lab results found in last 7 days.  LFTs  Recent Labs  Lab 01/07/18  0640 01/06/18  0932   ALKPHOS 36* 51   AST 24 43   ALT 48 63*   BILITOTAL 0.8 0.5   PROTTOTAL 5.9* 8.6   ALBUMIN 3.0* 4.3      PANC  Recent Labs  Lab 01/06/18  0932   LIPASE 123       Imaging:  CT abdomen/Pelvis 1/7/18:  The liver appears mildly enlarged, measuring 18 cm in craniocaudal dimension with heterogeneous perfusion. There is prominent periportal edema. There is an 11 mm arterially enhancing, ill-defined lesion in the medial left lobe of the liver, segment 2 (series 5, image 42). A subcentimeter focus of enhancement is seen in the medial right hepatic lobe segment 5, series 5 image 139. Dilatation of the IVC and hepatic veins. Pericholecystic fluid. The gallbladder is decompressed. No gallstones identified. No intrahepatic biliary ductal dilatation or extrahepatic biliary ductal dilatation. The spleen, pancreas, adrenal glands, and kidneys are within normal limits. Findings suggestive of a unicornuate uterus. Mild physiologic pelvic free fluid. No evidence of bowel obstruction. Mild thickening of jejunal loops in the left mid abdomen. The SMA and SMV remain patent. The colon is decompressed and mild wall thickening is presumably related. The appendix is air-filled and nondilated without adjacent inflammatory change. Mild mesenteric edema and vascular prominence. No pneumatosis, portal venous  gas, or free air. The bladder is mostly decompressed.   Lung bases: Minimal bibasilar atelectasis. No focal consolidation or pleural effusion.   No acute bony abnormalities.  Impression:   1.  Mild hepatomegaly with prominent periportal edema and pericholecystic fluid  as well as dilated IVC and hepatic veins which may be secondary to passive congestion from right heart failure. Hypervolemia and hepatitis are also considerations.  2. Heterogeneous hepatic perfusion with areas of hyperenhancement including an 11mm arterial enhancing focus in the left lobe of liver, likely related to congestive hepatopathy and vascular shunting.   3. Mild bowel wall thickening of jejunal loops in the left mid abdomen. The SMA and SMV remain patent and there is no pneumatosis or free air. Findings may be related to the above or inflammatory/infectious in nature.  4. Suggestion of a unicornuate uterus. Mild physiologic pelvic free fluid.     RUQ U/S 1/6/18:  Liver: The liver is mildly heterogeneous with prominence of the hepatic veins. The main portal vein is patent with antegrade flow. The liver measures 16 cm. Increased echogenicity of the portal triads, likely related to periportal edema seen previously. There is a small amount of right perihepatic subdiaphragmatic fluid measuring 5.6 x 4.3 x 0.7 cm.  Gallbladder: The gallbladder wall is thickened imaging up to 6 mm. There is no pericholecystic fluid. No intraluminal calculi or appreciable sludge. The sonographic Norton's sign was reportedly negative. The common bile duct measures 5 mm. No intrahepatic biliary ductal dilatation.  Pancreas: Visualized portions of the pancreas are unremarkable.   Kidneys: The right kidney measures 10.8 cm. No mass or hydronephrosis   Impression:   1.  Nonspecific gallbladder wall thickening measuring up to 6 mm without evidence of cholelithiasis or additional findings to suggest cholecystitis. Findings may be related to passive hepatic congestion given  prior CT findings.    2.  Trace perihepatic ascites    ATTENDING NOTE, GASTROENTEROLOGY/HEPATOLOGY    I saw and discussed this patient with the fellow and participated in the decision making. I agree with the fellow's note. Mahi Ramires MD

## 2018-01-07 NOTE — PLAN OF CARE
Problem: Patient Care Overview  Goal: Plan of Care/Patient Progress Review  Outpatient/Observation goals to be met before discharge home:     1. Nausea/vomiting (diarrhea if present) improved. YES, nausea present and improved with Zofran ODT, no emesis noted, no stool noted since arrived to ED or Unit  2. Tolerating oral intake to maintain hydration. NO, patient offered Full Liquid diet and has only tolerated 25% of morning meal, offered and agreed to try lemon lime soda, will advance as tolerated. Patient receiving IV hydration.  3. Vital signs normal or at patient baseline and orthostatic vitals are normal and patient not lightheaded with standing. - NO, pt stated slight lightheadedness  4. Diagnostic tests and consults completed and resulted if ordered. NO, further orders for additional testing  5. Adequate pain control on oral analgesia. NO, Receiving prn IV Dilaudid.  6. Tolerating oral antibiotics if ordered. N/A  7. Safe disposition plan has been identified. NO. Pending further testing and SW consult

## 2018-01-07 NOTE — PLAN OF CARE
Problem: Patient Care Overview  Goal: Plan of Care/Patient Progress Review  Outpatient/Observation goals to be met before discharge home:      1. Nausea/vomiting (diarrhea if present) improved. YES, nausea present and improved with Zofran ODT, no emesis noted, no stool noted since arrived to ED or Unit  2. Tolerating oral intake to maintain hydration. NO, patient offered Full Liquid diet and has only tolerated 25% of morning meal, offered and agreed to try lemon lime soda, pt hesitant to advance diet, will advance as tolerated. Patient receiving IV hydration.  3. Vital signs normal or at patient baseline and orthostatic vitals are normal and patient not lightheaded with standing. - NO, pt stated slight lightheadedness  4. Diagnostic tests and consults completed and resulted if ordered. NO, further orders for additional testing, GI Hepatology Consult, Chest CT, US Abdomen  5. Adequate pain control on oral analgesia. YES, pt received IV Dilaudid this am, has not requested further pain medication so far this shift  6. Tolerating oral antibiotics if ordered. N/A  7. Safe disposition plan has been identified. NO. Pending further testing and SW consult

## 2018-01-07 NOTE — PROGRESS NOTES
"[facility]  Daily Progress Note          Assessment & Plan:   María Ortiz is a 22 year old female with a past medical history of bilateral pulmonary embolism (on Xarelto) and IBS who presents to the Emergency Department today for evaluation of abdominal pain, vomiting, and diarrhea.      1. Abdominal pain, nausea, vomiting, and diarrhea: stated yesterday am. Lactic acid initially 6.7, improved with hydration. HD stable here. Persistent severe abdominal pain and nausea. Admit to observation for management of abdominal pain. CT with hepatomegaly and prominent periportal edema. RUQ US shows hepatic congestion. ECHO complete given concern for right heart failure as cause of hepatic congestion. ECHO shows mild-moderate TR with mild RA dilatation and dilated IVC.  -IVF  -ADAT  -Pain management with IV Dilaudid until tolerating PO intake  -IV antiemetics  -Stool for C. Diff and culture   -GI consulted      2. Pulmonary Embolism: Resume xeralto. Has not been taking for 3 weeks, given ECHO results as above finding will do CT to evaluate for PE.      3. Depression, Anxiety: Resume home atarax and Lexapro        FEN: ADAT  Lines: PIV  Prophylaxis: Xeralt          Consults:   GI         Discharge Planning:   Pending GI recommendations and CT results, anticipate another overnight        Interval History:   Notes ongoing abdominal pain and nausea. No vomiting or diarrhea.      ROS:   Constitutional: No fevers/chills.  Cardiovascular: No chest pain or palpitations.   Respiratory: No cough or SOB.             Physical Exam:   /67 (BP Location: Left arm)  Pulse 81  Temp 98.3  F (36.8  C) (Oral)  Resp 16  Ht 1.575 m (5' 2\")  Wt 56.2 kg (124 lb)  LMP 12/09/2017 (Exact Date)  SpO2 98%  Breastfeeding? No  BMI 22.68 kg/m2     GENERAL: Alert and oriented x 3. NAD.   HEENT: Anicteric sclera. Mucous membranes moist.   CV: RRR. S1, S2. No murmurs appreciated.   RESPIRATORY: Effort " normal. Lungs CTAB with no wheezing, rales, rhonchi.   GI: Abdomen soft, diffuse TTP and non distended with normoactive bowel sounds present in all quadrants. No tenderness, rebound, guarding.   NEUROLOGICAL: No focal deficits. Moves all extremities.    EXTREMITIES: No peripheral edema. Intact bilateral pedal pulses.   SKIN: No jaundice. No rashes.     Medication list reviewed.   Today's labs and imaging were reviewed.     LIZANDRO Johns, CNP  Emergency Department Observation Unit    Addendum 1600: CT negative for PE. Discussed with GI and recommend RUQ US with doppler to evaluate for portal vein thrombosis, hepatic vein thrombosis.     LIZANDRO Johns, CNP  Emergency Department Observation Unit

## 2018-01-07 NOTE — PHARMACY-ADMISSION MEDICATION HISTORY
Admission medication history interview status for the 1/6/2018 admission is complete. See Epic admission navigator for allergy information, pharmacy, prior to admission medications and immunization status.     Medication history interview sources:  María (patient), [facility] discharge    Changes made to PTA medication list (reason)  Added: None  Deleted: Cyclobenzaprine 10 mg tablet - no longer needed      Diclofenac 1% topical gel - doesn't work      Metoclopramide 5 mg tablet - no longer needed      Naproxen 500 mg tablet - doesn't want to take more pain meds      Oxycodone 5 mg IR tablet - has not taken since September      PEG packet - no longer needed      Tramadol 50 mg tablet - doesn't want more pain meds  Changed: Rivaroxaban 20 mg (not 15 mg) tablet - was on 15 mg BID for three weeks, then switched to 20 mg daily    Additional medication history information (including reliability of information, actions taken by pharmacist):    María was pleasant to speak with and was a moderate historian of her medication use. Doses were verified with [place] discharge. María seems to be averse to taking more medications than necessary and admits issues currently with adherence due to issues with [facility] covering her medications.    Of note: María has not taken her anticoagulant for 3 weeks.    Allergies and flu shot status confirmed with patient. María would like a flu shot.    [facility]      Prior to Admission medications    Medication Sig Last Dose Taking? Auth Provider   acetaminophen (TYLENOL) 325 MG tablet Take 325-650 mg by mouth every 6 hours as needed for mild pain 1/5/2018 at PM Yes Unknown, Entered By History   omeprazole (PRILOSEC) 40 MG capsule Take 40 mg by mouth daily Past Month at Unknown time Yes Unknown, Entered By History   ondansetron (ZOFRAN-ODT) 4 MG ODT tab Take 4 mg by mouth every 8 hours as needed for nausea Past Month at Unknown time Yes Unknown, Entered By  History   rivaroxaban ANTICOAGULANT (XARELTO) 20 MG TABS tablet Take 20 mg by mouth daily (with dinner) Past Month at Unknown time Yes Unknown, Entered By History   escitalopram (LEXAPRO) 20 MG tablet Take 1 tablet (20 mg) by mouth daily Past Month at Unknown time Yes Rose Villalba MD   hydrOXYzine (ATARAX) 25 MG tablet Take 1 tablet (25 mg) by mouth every 4 hours as needed for anxiety Past Week at Unknown time Yes Rose Villalba MD   norethindrone (MICRONOR) 0.35 MG per tablet Take 1 tablet (0.35 mg) by mouth daily Past Month at Unknown time Yes Rose Villalba MD   dicyclomine (BENTYL) 10 MG capsule Take 10-20 mg by mouth 2 times daily as needed More than a month at Unknown time  Unknown, Entered By History   senna-docusate (SENOKOT-S;PERICOLACE) 8.6-50 MG per tablet Take 1 tablet by mouth 2 times daily as needed for constipation More than a month at Unknown time  Rose Villalba MD   albuterol (PROAIR HFA/PROVENTIL HFA/VENTOLIN HFA) 108 (90 BASE) MCG/ACT Inhaler Inhale 2 puffs into the lungs every 4 hours as needed for shortness of breath / dyspnea or wheezing More than a month at Unknown time  Unknown, Entered By History         Medication history completed by: Ele Pierce, 2nd Year Student Pharmacist

## 2018-01-07 NOTE — PLAN OF CARE
Problem: Patient Care Overview  Goal: Discharge Needs Assessment  Nausea/vomiting (diarrhea if present) improved. Yes. No diarrhea noted.  - Tolerating oral intake to maintain hydration. No. Patient NPO and receiving iv hydration.  - Vital signs normal or at patient baseline and orthostatic vitals are normal and patient not lightheaded with standing. Yes  - Diagnostic tests and consults completed and resulted if ordered. No. Stool studies and ultrasound pending.  - Adequate pain control on oral analgesia. No. Receiving iv dilaudid.  - Tolerating oral antibiotics if ordered. N/A  - Safe disposition plan has been identified. No. Pending.

## 2018-01-07 NOTE — PLAN OF CARE
Problem: Patient Care Overview  Goal: Plan of Care/Patient Progress Review  Outpatient/Observation goals to be met before discharge home:    1. Nausea/vomiting (diarrhea if present) improved. YES, no stool noted since arrived to ED or Unit  2. Tolerating oral intake to maintain hydration. NO, patient offered Full Liquid diet, will advance as tolerated. Patient receiving IV hydration.  3. Vital signs normal or at patient baseline and orthostatic vitals are normal and patient not lightheaded with standing. - NO  4. Diagnostic tests and consults completed and resulted if ordered. NO, Stool studies and ultrasound pending.  5. Adequate pain control on oral analgesia. NO, Receiving prn IV Dilaudid.  6. Tolerating oral antibiotics if ordered. N/A  7. Safe disposition plan has been identified. NO. Pending further testing and SW consult

## 2018-01-07 NOTE — PROGRESS NOTES
"Social Work Services Progress Note    Hospital Day: 1  Date of Initial Social Work Evaluation: not yet completed  Collaborated with: bedside nurse, pt., Discharge Pharmacy    Data: order for financial concerns - pt voiced inability to pay for Xarelto medication  Intervention: met with pt., spoke with Gaurav in Discharge Pharmacy.   Assessment: Month supply for this medication is $75. She is asking for help until the 15th, when she gets paid.   Plan:    Discharge Plans in Progress: Maybe leaving Monday. Mom will transport.     Barriers to d/c plan: Help with meds    Follow up Plan: Will ask weekday SW to f/u with d/c pharmacy for \"co-pay card\" for Xarelto. Pt. Needs to activate this.        "

## 2018-01-07 NOTE — PROGRESS NOTES
"S: Patient admitted to observation for acute gastritis dehydration.  Patient's lactic acid was 6.5 but improved with 2 L of fluid down to 2.5.  Patient nonfocal abdominal pain evaluated.  Symptoms now improved nausea vomiting no more diarrhea.  Patient is on Xarelto but no reports of hematemesis or hematochezia.  Patient does note some concerns for panic attacks and how to manage these she is on medications but would like further help.  Currently is doing better.  Patient is hungry.  O:/63 (BP Location: Left arm)  Pulse 54  Temp 99.1  F (37.3  C) (Axillary)  Resp 18  Ht 1.575 m (5' 2\")  Wt 54.9 kg (121 lb)  LMP 12/09/2017 (Exact Date)  SpO2 98%  Breastfeeding? No  BMI 22.13 kg/m2  General patient is now up ambulating the room appears more comfortable.  Is eating ice chips.  Her exam does not reveal any significant focal findings on exam.  Some generalized abdominal tenderness no rebound guarding or rigidity.  Neuro is nonfocal.  Results for orders placed or performed during the hospital encounter of 01/06/18   CT Abdomen Pelvis w Contrast    Narrative    Examination:  CT ABDOMEN PELVIS W CONTRAST 1/6/2018 4:42 PM     History: Elevated lactic acid    Comparison: CT of the chest from 8/15/2017    Technique: CT of the abdomen and pelvis were obtained with IV  contrast. Sagittal and coronal reconstructions created and reviewed.    Findings:   The liver appears mildly enlarged, measuring 18 cm in craniocaudal  dimension with heterogeneous perfusion. There is prominent periportal  edema. There is an 11 mm arterially enhancing, ill-defined lesion in  the medial left lobe of the liver, segment 2 (series 5, image 42). A  subcentimeter focus of enhancement is seen in the medial right hepatic  lobe segment 5, series 5 image 139. Dilatation of the IVC and hepatic  veins. Pericholecystic fluid. The gallbladder is decompressed. No  gallstones identified. No intrahepatic biliary ductal dilatation or  extrahepatic " biliary ductal dilatation. The spleen, pancreas, adrenal  glands, and kidneys are within normal limits. Findings suggestive of a  unicornuate uterus. Mild physiologic pelvic free fluid. No evidence of  bowel obstruction. Mild thickening of jejunal loops in the left mid  abdomen. The SMA and SMV remain patent. The colon is decompressed and  mild wall thickening is presumably related. The appendix is air-filled  and nondilated without adjacent inflammatory change. Mild mesenteric  edema and vascular prominence. No pneumatosis, portal venous gas, or  free air. The bladder is mostly decompressed.    Lung bases: Minimal bibasilar atelectasis. No focal consolidation or  pleural effusion.     No acute bony abnormalities.      Impression    Impression:   1.  Mild hepatomegaly with prominent periportal edema and  pericholecystic fluid  as well as dilated IVC and hepatic veins which  may be secondary to passive congestion from right heart failure.  Hypervolemia and hepatitis are also considerations.  2. Heterogeneous hepatic perfusion with areas of hyperenhancement  including an 11mm arterial enhancing focus in the left lobe of liver,  likely related to congestive hepatopathy and vascular shunting.   3. Mild bowel wall thickening of jejunal loops in the left mid  abdomen. The SMA and SMV remain patent and there is no pneumatosis or  free air. Findings may be related to the above or  inflammatory/infectious in nature.  4. Suggestion of a unicornuate uterus. Mild physiologic pelvic free  fluid.     I have personally reviewed the examination and initial interpretation  and I agree with the findings.    MILES ROJAS MD   US Abdomen Limited Portable    Narrative    EXAMINATION: US ABDOMEN LIMITED PORTABLE, 1/6/2018 6:13 PM     COMPARISON: CT performed the same day    HISTORY: Evaluate for cholecystitis    TECHNIQUE: The abdomen was scanned in standard fashion with  specialized ultrasound transducer(s) using both grey scale and  limited  color Doppler techniques.    Findings:  Liver: The liver is mildly heterogeneous with prominence of the  hepatic veins. The main portal vein is patent with antegrade flow. The  liver measures 16 cm. Increased echogenicity of the portal triads,  likely related to periportal edema seen previously. There is a small  amount of right perihepatic subdiaphragmatic fluid measuring 5.6 x 4.3  x 0.7 cm.    Gallbladder: The gallbladder wall is thickened imaging up to 6 mm.  There is no pericholecystic fluid. No intraluminal calculi or  appreciable sludge. The sonographic Norton's sign was reportedly  negative. The common bile duct measures 5 mm. No intrahepatic biliary  ductal dilatation.    Pancreas: Visualized portions of the pancreas are unremarkable.     Kidneys: The right kidney measures 10.8 cm. No mass or hydronephrosis.          Impression    Impression:   1.  Nonspecific gallbladder wall thickening measuring up to 6 mm  without evidence of cholelithiasis or additional findings to suggest  cholecystitis. Findings may be related to passive hepatic congestion  given prior CT findings.    2.  Trace perihepatic ascites.     I have personally reviewed the examination and initial interpretation  and I agree with the findings.    MILES ROJAS MD   CBC with platelets differential   Result Value Ref Range    WBC 11.5 (H) 4.0 - 11.0 10e9/L    RBC Count 4.68 3.8 - 5.2 10e12/L    Hemoglobin 14.9 11.7 - 15.7 g/dL    Hematocrit 42.7 35.0 - 47.0 %    MCV 91 78 - 100 fl    MCH 31.8 26.5 - 33.0 pg    MCHC 34.9 31.5 - 36.5 g/dL    RDW 12.8 10.0 - 15.0 %    Platelet Count 204 150 - 450 10e9/L    Diff Method Automated Method     % Neutrophils 72.1 %    % Lymphocytes 21.2 %    % Monocytes 6.0 %    % Eosinophils 0.2 %    % Basophils 0.3 %    % Immature Granulocytes 0.2 %    Nucleated RBCs 0 0 /100    Absolute Neutrophil 8.3 1.6 - 8.3 10e9/L    Absolute Lymphocytes 2.4 0.8 - 5.3 10e9/L    Absolute Monocytes 0.7 0.0 - 1.3 10e9/L     Absolute Eosinophils 0.0 0.0 - 0.7 10e9/L    Absolute Basophils 0.0 0.0 - 0.2 10e9/L    Abs Immature Granulocytes 0.0 0 - 0.4 10e9/L    Absolute Nucleated RBC 0.0    Basic metabolic panel   Result Value Ref Range    Sodium 144 133 - 144 mmol/L    Potassium 3.5 3.4 - 5.3 mmol/L    Chloride 110 (H) 94 - 109 mmol/L    Carbon Dioxide 20 20 - 32 mmol/L    Anion Gap 14 3 - 14 mmol/L    Glucose 124 (H) 70 - 99 mg/dL    Urea Nitrogen 6 (L) 7 - 30 mg/dL    Creatinine 0.70 0.52 - 1.04 mg/dL    GFR Estimate >90 >60 mL/min/1.7m2    GFR Estimate If Black >90 >60 mL/min/1.7m2    Calcium 9.6 8.5 - 10.1 mg/dL   Lactic acid whole blood   Result Value Ref Range    Lactic Acid 6.8 (HH) 0.7 - 2.0 mmol/L   UA reflex to Microscopic and Culture   Result Value Ref Range    Color Urine Yellow     Appearance Urine Clear     Glucose Urine Negative NEG^Negative mg/dL    Bilirubin Urine Negative NEG^Negative    Ketones Urine 80 (A) NEG^Negative mg/dL    Specific Gravity Urine 1.016 1.003 - 1.035    Blood Urine Negative NEG^Negative    pH Urine 8.0 (H) 5.0 - 7.0 pH    Protein Albumin Urine 10 (A) NEG^Negative mg/dL    Urobilinogen mg/dL Normal 0.0 - 2.0 mg/dL    Nitrite Urine Negative NEG^Negative    Leukocyte Esterase Urine Moderate (A) NEG^Negative    Source Midstream Urine     RBC Urine 0 0 - 2 /HPF    WBC Urine 7 (H) 0 - 2 /HPF    Squamous Epithelial /HPF Urine 2 (H) 0 - 1 /HPF    Mucous Urine Present (A) NEG^Negative /LPF   HCG qualitative urine   Result Value Ref Range    HCG Qual Urine Negative NEG^Negative   Hepatic panel   Result Value Ref Range    Bilirubin Direct 0.1 0.0 - 0.2 mg/dL    Bilirubin Total 0.5 0.2 - 1.3 mg/dL    Albumin 4.3 3.4 - 5.0 g/dL    Protein Total 8.6 6.8 - 8.8 g/dL    Alkaline Phosphatase 51 40 - 150 U/L    ALT 63 (H) 0 - 50 U/L    AST 43 0 - 45 U/L   Lipase   Result Value Ref Range    Lipase 123 73 - 393 U/L   Lactic acid whole blood   Result Value Ref Range    Lactic Acid 2.1 (H) 0.7 - 2.0 mmol/L   Magnesium    Result Value Ref Range    Magnesium 1.8 1.6 - 2.3 mg/dL   ISTAT gases lactate irene POCT   Result Value Ref Range    Ph Venous 7.48 (H) 7.32 - 7.43 pH    PCO2 Venous 26 (L) 40 - 50 mm Hg    PO2 Venous 37 25 - 47 mm Hg    Bicarbonate Venous 20 (L) 21 - 28 mmol/L    O2 Sat Venous 77 %    Lactic Acid 2.5 (H) 0.7 - 2.1 mmol/L   Influenza A/B antigen   Result Value Ref Range    Influenza A/B Agn Specimen Nasopharyngeal     Influenza A Negative NEG^Negative    Influenza B Negative NEG^Negative   Urine Culture Aerobic Bacterial   Result Value Ref Range    Specimen Description Unspecified Urine     Special Requests Specimen received in preservative     Culture Micro PENDING        A: Acute gastroenteritis with mild dehydration elevated lactic acid now improved with IV fluids.  Patient's influenza testing negative.  Stools were ordered but none being able to be reproduced at this point.  Patient had CT scan of the abdomen with some nonspecific findings.  Right upper quadrant ultrasound was done there is no sign of any acute cholecystitis but more some gallbladder congestion.  No stone or obstruction identified.  Patient with panic attacks also.  P: Continue IV hydration and advance diet as tolerated we will reevaluate in the morning to make sure no focal abdominal tenderness at this point appears to be or gastroenteritis.      We will need to look for outpatient management of patient's concern for anxiety panic attacks etc.

## 2018-01-07 NOTE — PROGRESS NOTES
"S: Patient still with some abdominal pain describes more epigastric.  No diarrhea.  O:/62  Pulse 67  Temp 98.2  F (36.8  C) (Oral)  Resp 18  Ht 1.575 m (5' 2\")  Wt 56.2 kg (124 lb)  LMP 12/09/2017 (Exact Date)  SpO2 97%  Breastfeeding? No  BMI 22.68 kg/m2    In general patient appears nontoxic appears better still tired.  Abdomen with some none localizing tenderness abdomen soft no rebound guarding or rigidity.  Abdominal pain/    A: Abdominal pain; consider gastroneuritis to dehydration versus underlying liver etiology.  The fellow continue symptomatic treatment      She  history of blood clots has been off Xarelto for several weeks.  P: Diet advancement.       Consider GI consultation also regarding liver findings and abdominal findings on CT.       Patient will describing some anxiety.  Would recommend following up with her providers in her insurance network or [facility] or associated clinics of psychology.  Information given to patient.      "

## 2018-01-08 LAB
ANION GAP SERPL CALCULATED.3IONS-SCNC: 8 MMOL/L (ref 3–14)
BUN SERPL-MCNC: 4 MG/DL (ref 7–30)
C COLI+JEJUNI+LARI FUSA STL QL NAA+PROBE: NOT DETECTED
C DIFF TOX B STL QL: NEGATIVE
CALCIUM SERPL-MCNC: 8 MG/DL (ref 8.5–10.1)
CHLORIDE SERPL-SCNC: 107 MMOL/L (ref 94–109)
CO2 SERPL-SCNC: 24 MMOL/L (ref 20–32)
CREAT SERPL-MCNC: 0.66 MG/DL (ref 0.52–1.04)
EC STX1 GENE STL QL NAA+PROBE: NOT DETECTED
EC STX2 GENE STL QL NAA+PROBE: NOT DETECTED
ENTERIC PATHOGEN COMMENT: NORMAL
GFR SERPL CREATININE-BSD FRML MDRD: >90 ML/MIN/1.7M2
GLUCOSE SERPL-MCNC: 90 MG/DL (ref 70–99)
NOROV GI+II ORF1-ORF2 JNC STL QL NAA+PR: NOT DETECTED
POTASSIUM SERPL-SCNC: 3.8 MMOL/L (ref 3.4–5.3)
RVA NSP5 STL QL NAA+PROBE: NOT DETECTED
SALMONELLA SP RPOD STL QL NAA+PROBE: NOT DETECTED
SHIGELLA SP+EIEC IPAH STL QL NAA+PROBE: NOT DETECTED
SODIUM SERPL-SCNC: 138 MMOL/L (ref 133–144)
SPECIMEN SOURCE: NORMAL
V CHOL+PARA RFBL+TRKH+TNAA STL QL NAA+PR: NOT DETECTED
Y ENTERO RECN STL QL NAA+PROBE: NOT DETECTED

## 2018-01-08 PROCEDURE — 96375 TX/PRO/DX INJ NEW DRUG ADDON: CPT

## 2018-01-08 PROCEDURE — 36415 COLL VENOUS BLD VENIPUNCTURE: CPT | Performed by: NURSE PRACTITIONER

## 2018-01-08 PROCEDURE — G0378 HOSPITAL OBSERVATION PER HR: HCPCS

## 2018-01-08 PROCEDURE — 25800025 ZZH RX 258: Performed by: NURSE PRACTITIONER

## 2018-01-08 PROCEDURE — 25000125 ZZHC RX 250: Performed by: NURSE PRACTITIONER

## 2018-01-08 PROCEDURE — 25000128 H RX IP 250 OP 636: Performed by: PHYSICIAN ASSISTANT

## 2018-01-08 PROCEDURE — 87493 C DIFF AMPLIFIED PROBE: CPT | Performed by: FAMILY MEDICINE

## 2018-01-08 PROCEDURE — 25000125 ZZHC RX 250: Performed by: PHYSICIAN ASSISTANT

## 2018-01-08 PROCEDURE — 96361 HYDRATE IV INFUSION ADD-ON: CPT

## 2018-01-08 PROCEDURE — 87209 SMEAR COMPLEX STAIN: CPT | Performed by: NURSE PRACTITIONER

## 2018-01-08 PROCEDURE — 25000132 ZZH RX MED GY IP 250 OP 250 PS 637: Performed by: NURSE PRACTITIONER

## 2018-01-08 PROCEDURE — 96376 TX/PRO/DX INJ SAME DRUG ADON: CPT

## 2018-01-08 PROCEDURE — 80048 BASIC METABOLIC PNL TOTAL CA: CPT | Performed by: NURSE PRACTITIONER

## 2018-01-08 PROCEDURE — 87506 IADNA-DNA/RNA PROBE TQ 6-11: CPT | Performed by: FAMILY MEDICINE

## 2018-01-08 PROCEDURE — 99225 ZZC SUBSEQUENT OBSERVATION CARE,LEVEL II: CPT | Mod: Z6 | Performed by: INTERNAL MEDICINE

## 2018-01-08 PROCEDURE — 25000128 H RX IP 250 OP 636: Performed by: NURSE PRACTITIONER

## 2018-01-08 PROCEDURE — 87177 OVA AND PARASITES SMEARS: CPT | Performed by: NURSE PRACTITIONER

## 2018-01-08 PROCEDURE — 25000132 ZZH RX MED GY IP 250 OP 250 PS 637: Performed by: PHYSICIAN ASSISTANT

## 2018-01-08 RX ORDER — DEXTROSE MONOHYDRATE, SODIUM CHLORIDE, AND POTASSIUM CHLORIDE 50; 1.49; 4.5 G/1000ML; G/1000ML; G/1000ML
INJECTION, SOLUTION INTRAVENOUS CONTINUOUS
Status: DISPENSED | OUTPATIENT
Start: 2018-01-08 | End: 2018-01-08

## 2018-01-08 RX ADMIN — POLYETHYLENE GLYCOL 3350 17 G: 17 POWDER, FOR SOLUTION ORAL at 08:46

## 2018-01-08 RX ADMIN — ACETAMINOPHEN 1000 MG: 500 TABLET, FILM COATED ORAL at 20:37

## 2018-01-08 RX ADMIN — POTASSIUM CHLORIDE, DEXTROSE MONOHYDRATE AND SODIUM CHLORIDE: 150; 5; 450 INJECTION, SOLUTION INTRAVENOUS at 04:24

## 2018-01-08 RX ADMIN — ESCITALOPRAM OXALATE 20 MG: 10 TABLET ORAL at 08:06

## 2018-01-08 RX ADMIN — PROCHLORPERAZINE EDISYLATE 10 MG: 5 INJECTION INTRAMUSCULAR; INTRAVENOUS at 08:16

## 2018-01-08 RX ADMIN — ONDANSETRON 4 MG: 2 INJECTION INTRAMUSCULAR; INTRAVENOUS at 04:27

## 2018-01-08 RX ADMIN — RIVAROXABAN 20 MG: 20 TABLET, FILM COATED ORAL at 16:54

## 2018-01-08 RX ADMIN — PROCHLORPERAZINE EDISYLATE 10 MG: 5 INJECTION INTRAMUSCULAR; INTRAVENOUS at 20:37

## 2018-01-08 RX ADMIN — HYDROXYZINE HYDROCHLORIDE 25 MG: 25 TABLET ORAL at 20:37

## 2018-01-08 RX ADMIN — PANTOPRAZOLE SODIUM 40 MG: 40 INJECTION, POWDER, FOR SOLUTION INTRAVENOUS at 20:22

## 2018-01-08 RX ADMIN — PANTOPRAZOLE SODIUM 40 MG: 40 INJECTION, POWDER, FOR SOLUTION INTRAVENOUS at 08:08

## 2018-01-08 RX ADMIN — Medication 0.2 MG: at 04:20

## 2018-01-08 RX ADMIN — Medication 0.2 MG: at 00:02

## 2018-01-08 RX ADMIN — NORETHINDRONE 0.35 MG: 0.35 TABLET ORAL at 14:34

## 2018-01-08 RX ADMIN — SODIUM CHLORIDE: 9 INJECTION, SOLUTION INTRAVENOUS at 01:10

## 2018-01-08 RX ADMIN — HYDROXYZINE HYDROCHLORIDE 25 MG: 25 TABLET ORAL at 08:06

## 2018-01-08 RX ADMIN — ONDANSETRON 4 MG: 2 INJECTION INTRAMUSCULAR; INTRAVENOUS at 16:54

## 2018-01-08 ASSESSMENT — ENCOUNTER SYMPTOMS
ACTIVITY IMPAIRMENT: NORMAL
NAUSEA: 1
PAIN SEVERITY NOW: MILD
DIARRHEA: 1
SUBJECTIVE PATIENT PAIN CONTROL: PARTIALLY CONTROLLED
SUBJECTIVE PAIN PROGRESSION: IMPROVING
DIETARY ISSUES: POOR INTAKE

## 2018-01-08 ASSESSMENT — PAIN DESCRIPTION - DESCRIPTORS: DESCRIPTORS: ACHING

## 2018-01-08 NOTE — PROGRESS NOTES
Brief progress note/chart review:    María Ortiz is a 22 year old female with a past medical history of bilateral pulmonary embolism (on Xarelto) and IBS who presents to the Emergency Department today for evaluation of abdominal pain, vomiting, and diarrhea.       # Abdominal pain, nausea, vomiting, and diarrhea  #  Viral bacterial gastroenteritis  Stated yesterday am. Lactic acid initially 6.7, improved with hydration. HD stable here. Persistent severe abdominal pain and nausea. Admit to observation for management of abdominal pain.   -ADAT  -Pain management with IV Dilaudid until tolerating PO intake (used 1.6 IV dilaudid on 1/6, used 0.8 IV dilaudid on 1/7)  -IV antiemetics (zofran ODT given 1000, 1900, compazine tab 2300).  - C diff, ova parasite, virus/enteric bacteria pending.  - Influenza negative, UCx negative.    # CT with hepatomegaly and prominent periportal edema.   # RUQ US shows hepatic congestion.   # R sided HF (mild)  - ECHO complete given concern for right heart failure as cause of hepatic congestion. ECHO shows mild-moderate TR with mild RA dilatation and dilated IVC.  -GI consult (recommend RUQ US with doppler to evaluate for portal vein thrombosis, hepatic vein thrombosis).   1/7/18 at 1800 Impression:   1. Patent hepatic and perihepatic portal vessels.  2. Mild persistent nonspecific gallbladder wall thickening.  3. A complete abdominal ultrasound examination was not performed.      # Pulmonary Embolism  # Chronic Anticoagulation  - Resume Xarelto. Has not been taking for 3 weeks, given ECHO results (mild-moderate TR with mild RA dilatation and dilated IVC). This admission Repeat CT negative for PE.    # Depression, Anxiety: Resume home atarax and Lexapro     FEN: ADAT,  ml/hr  Lines: PIV  Prophylaxis: Xarelto    Labs pending: C diff, Enteric Bacteria/Virus, Ova & Parasite all pending, not yet collected.  - BMP with AM labs, just with nausea poor po intake.     Camila CARLSON    1/8/18 01/07/18 2323 -- -- -- 124/87 16 -- -- -- -- --       01/07/18 2322 98.1  F (36.7  C) 62 -- 124/87 16 99 % -- None (Room air) -- --      01/07/18 2112 98.1  F (36.7  C) 59 -- 114/77 16 98 % -- None (Room air) -- --      01/07/18 1216 98.3  F (36.8  C) 81 -- 112/67 16 98 % -- None (Room air) --

## 2018-01-08 NOTE — PLAN OF CARE
"Problem: Patient Care Overview  Goal: Discharge Needs Assessment  Problem: Patient Care Overview  Goal: Plan of Care/Patient Progress Review  Outpatient/Observation goals to be met before discharge home:   1. Nausea/vomiting (diarrhea if present) improved. YES,, no emesis noted, no stool noted since arrived to ED or Unit    2. Tolerating oral intake to maintain hydration. NO, patient offered Full Liquid. Drinking lemon lime soda, pt hesitant to advance diet, will advance as tolerated. Patient receiving IV hydration.    3. Vital signs normal or at patient baseline and orthostatic vitals are normal and patient not lightheaded with standing. - NO, pt stated slight lightheadedness    4. Diagnostic tests and consults completed and resulted if ordered. NO, further orders for additional testing, GI Hepatology Consult, Chest CT, US Abdomen    5. Adequate pain control on oral analgesia. YES, pt received IV Dilaudid  for pain control.    6. Tolerating oral antibiotics if ordered. N/A    7. Safe disposition plan has been identified. Yes, maybe leaving on  Monday. Mom will transport, per  note. Blood pressure 114/77, pulse 59, temperature 98.1  F (36.7  C), temperature source Oral, resp. rate 16, height 1.575 m (5' 2\"), weight 56.2 kg (124 lb), last menstrual period 12/09/2017, SpO2 98 %, not currently breastfeeding.       Pending further testing and SW consult                        "

## 2018-01-08 NOTE — PROGRESS NOTES
"María Ortiz is a 22 year old female with a past medical history of bilateral pulmonary embolism (on Xarelto) and IBS who presents to the Emergency Department today for evaluation of abdominal pain, vomiting, and diarrhea.       # Abdominal pain, nausea, vomiting, and diarrhea  #  Viral bacterial gastroenteritis  Stated yesterday am. Lactic acid initially 6.7, improved with hydration. HD stable here. Persistent nausea and diarrhea still present.   Abdominal pain seems to improving today. Patient unable to tolerate po yet.    -Pain management initially with IV Dilaudid, now transitioned to Acetaminophen 1000 mm q6 prn  -IV antiemetics   - C diff, enteric bacteria back and negative.  - Ova & parasite pending  - Influenza negative, UCx negative.     # CT with hepatomegaly and prominent periportal edema.   # RUQ US Most likely etiology of periportal edema and dilated IVC and hepatic veins is right heart failure from previous history of pulmonary embolism per GI consult  # R sided HF (mild)  - ECHO complete given concern for right heart failure as cause of hepatic congestion. ECHO shows mild-moderate TR with mild RA dilatation and dilated IVC.  # Cardiology consult recc:  -No intervention at this time   -Patient can auto-diurese, no strong indication for diuresis given that she is asymptomatic   -Would avoid aggressive narcotics given her excessive yawning ( IV dilaudid discontinued)  -Advised her that should she develop concerning signs/symptoms of heart failure she can come to our office for management as an outpatient  -Otherwise, she can follow up with her PCP     # Pulmonary Embolism  # Chronic Anticoagulation  - Resume Xarelto. Has not been taking for 3 weeks, given ECHO results (mild-moderate TR with mild RA dilatation and dilated IVC). Pt voiced inability to pay for Xarelto medication. SW contacted discharge Pharmacy  with will provide co-pay card\" for Xarelto. She will need a prescription for Xeralto at " discharge. This admission Repeat CT negative for PE.       # Depression, Anxiety: Resume home atarax and Lexapro

## 2018-01-08 NOTE — PROGRESS NOTES
Brief GI progress note    Ultrasound liver showed no signs of portal vein, hepatic vein thrombosis.  Most likely etiology of periportal edema and dilated IVC and hepatic veins is  right heart failure from previous history of pulmonary embolism.  Hepatology service will sign off, please call if any questions.    Leon Benavides  GI fellow

## 2018-01-08 NOTE — PLAN OF CARE
Problem: Patient Care Overview  Goal: Plan of Care/Patient Progress Review  Outpatient/Observation goals to be met before discharge home:    1.) Nausea/vomiting (diarrhea if present) improved - Yes.   2.) Tolerating oral intake to maintain hydration - No.  3.) Vital signs normal or at patient baseline and orthostatic vitals are normal and patient not lightheaded with standing - No. BP slightly elevated earlier today.  4.) Diagnostic tests and consults completed and resulted if ordered - No.  5.) Adequate pain control on oral analgesia - No. Patient continues to complain of abdominal pain.  6.) Tolerating oral antibiotics if ordered - No antibiotics ordered.  7.) Safe disposition plan has been identified - Home when medically stable.

## 2018-01-08 NOTE — PLAN OF CARE
"Problem: Patient Care Overview  Goal: Discharge Needs Assessment  Problem: Patient Care Overview  Goal: Discharge Needs Assessment      Problem: Patient Care Overview  Goal: Plan of Care/Patient Progress Review  Outpatient/Observation goals to be met before discharge home:   1. Nausea/vomiting (diarrhea if present) improved. No, Continues to complain of nausea, no emesis noted. Alternating IV Zofran and oral Compazine through out. No stool noted since  admission.     2. Tolerating oral intake to maintain hydration. No, patient offered Full Liquid. Drinking lemon lime soda, and some posicles.Appears to have poor appetite. She is hesitant to advance diet, will advance as tolerated. Patient receiving IV hydration.    3. Vital signs normal or at patient baseline and orthostatic vitals are normal and patient not lightheaded with standing. - No, pt stated slight lightheadedness previously.    4. Diagnostic tests and consults completed and resulted if ordered. NO, further orders for additional testing, GI Hepatology Consult, Chest CT, US Abdomen, and ECHO completed.     5. Adequate pain control on oral analgesia. Yes, pt received IV Dilaudid  for pain control and oral Tylenol with some relief    6. Tolerating oral antibiotics if ordered. N/A    Alert and oriented x 4. Independent to bathroom. No BM since admission, therefore stool sample not collected. Sleeping intermittently. Blood pressure 124/88, pulse 62, temperature 97.9  F (36.6  C), temperature source Oral, resp. rate 16, height 1.575 m (5' 2\"), weight 56.2 kg (124 lb), last menstrual period 12/09/2017, SpO2 98 %, not currently breastfeeding.                       "

## 2018-01-08 NOTE — PROGRESS NOTES
Social Work Services Progress Note    Hospital Day: 2 (observation)  Collaborated with:  Pt and d/c pharmacy    Data:  SW asked to f/u, by weekend SWer, regarding Xarelto co-pay card. Writer discussed w/ d/c pharmacy and informed they could tube up the card and pt would need to activate it. The co-pay card would reduce pt's monthly cost to $10. Pt also qualifies for a voucher for a month free. At discharge, pt's Xarelto script needs to be sent down to d/c pharmacy.     Intervention:  Medication Assistance    Assessment:  Pt was receptive to receiving the financial assistance for her medication.    Plan:    Anticipated Disposition:  Home, no needs identified    Barriers to d/c plan:  None anticipated    Follow Up:  No further SW needs identified.

## 2018-01-08 NOTE — PLAN OF CARE
Problem: Patient Care Overview  Goal: Plan of Care/Patient Progress Review  Outcome: Improving  Outpatient/Observation goals to be met before discharge home:  Nausea/vomiting (diarrhea if present) improved.   - Tolerating oral intake to maintain hydration: no, patient only taking in minimal amounts of clears  - Vital signs normal or at patient baseline and orthostatic vitals are normal and patient not lightheaded with standing: VSS, denies any dizziness or lightheadedness when standing.  - Diagnostic tests and consults completed and resulted if ordered: No, patient has not been able to provide a stool sample  - Adequate pain control on oral analgesia: no, patient has been requesting IV pain meds overnight.  Will encourage patient to wean off  - Tolerating oral antibiotics if ordered: n/a  - Safe disposition plan has been identified: to d/c home

## 2018-01-08 NOTE — CONSULTS
Cardiology Inpatient Consultation  January 8, 2018    Reason for Consult:  A cardiology consult was requested by Vielka Fields of the ED OBS service to provide clinical guidance regarding ?HFpEF.    HPI:   María Ortiz is a 22 year old female with history of extensive bilateral PE in August of 2017 on xarelto, IBS with recent normal EGD/colonscopy who is admitted to ED OBS service with abdominal pain, nausea, vomiting, diarrhea. CT abdomen/pelvis revealed mild hepatomegaly with perioportal edema and a dilated IVC and hepatic veins. LFTs normal.     Subsequently, a TTE was obtained that revealed LV EF 60-65%, normal RV size/function, mild to moderate tricuspid insufficiency is present with two small centrally directed jets present and hepatic vein systolic flow reversal, IVC dilated without respiratory variation, estimated mean RA pressure 15 mmHg.  Notably, patient did receive IV fluids in resucitation and these are all findings consistent with fluid.     Cardiology is consulted for assistance in management for ?HFpEF. The dilation seen on her imaging is likely secondary to history of bilateral extensive PEs last year, and IV fluid resuscitation this admission. CT PE this admission is negative. When interviewing María, she states that leading up to her hospitalization she was without any complaint of orthopnea, PND, edema. She denies chest pain, light headedness, syncope. She denies palpitation, exertional dyspnea, and notes that she has been able to exercise without limitation (other than some deconditioning) since her PEs.     Her exam is unremarkable except for excessive yawning which is likely secondary to narcotics.      Assessment:   22 year old female with history of extensive bilateral PEs on xarelto who is admitted with abdominal pain, nausea, vomiting.     Question is whether she should be treated for HFpEF. On exam and with interview, there is no significant evidence that she has  heart failure. Specifically, she denies any complaint of chest pain, RICHARDSON, orthopnea, PND, light headedness, syncope. Her primary complaint is that she feels loopy and has excessive yawning which is likely in response to narcotic used this admission. TTE reveals findings consistent with copious fluid resuscitation. Her right ventricle size and function are normal, no right atrial dilation.     Plan:   -No intervention at this time   -Patient can auto-diurese, no strong indication for diuresis given that she is asymptomatic   -Would avoid aggressive narcotics given her excessive yawning   -Advised her that should she develop concerning signs/symptoms of heart failure she can come to our office for management as an outpatient  -Otherwise, she can follow up with her PCP     Review of Systems:    Complete review of systems was performed and negative except per HPI.    PMH:  Past Medical History:   Diagnosis Date     Anxiety      Depressive disorder      IBS (irritable bowel syndrome)      Pulmonary emboli (H)      Active Problems:  Patient Active Problem List    Diagnosis Date Noted     Gastroenteritis 01/06/2018     Priority: Medium     PE (pulmonary thromboembolism) (H) 08/17/2017     Priority: Medium     Bilateral pulmonary embolism (H) 08/15/2017     Priority: Medium     Social History:  Social History   Substance Use Topics     Smoking status: Never Smoker     Smokeless tobacco: Never Used     Alcohol use Yes      Comment: social     Family History:  No family history on file.    Medications:    rivaroxaban ANTICOAGULANT  20 mg Oral Daily with supper     escitalopram  20 mg Oral Daily     norethindrone  1 tablet Oral Daily     sodium chloride (PF)  3 mL Intracatheter Q8H     pantoprazole  40 mg Intravenous BID            Physical Exam:  Temp:  [97.9  F (36.6  C)-98.1  F (36.7  C)] 98  F (36.7  C)  Pulse:  [56-62] 56  Resp:  [16] 16  BP: (114-138)/() 138/107  SpO2:  [96 %-99 %] 96 %    Intake/Output Summary  (Last 24 hours) at 01/08/18 1420  Last data filed at 01/08/18 1400   Gross per 24 hour   Intake              580 ml   Output             2550 ml   Net            -1970 ml     GEN: pleasant, no acute distress  HEENT: no icterus  CV: RRR, normal s1/s2, no murmurs/rubs/s3/s4, no heave. Do not appreciate JVD  CHEST: clear to ausculation bilaterally, no rales or wheezing  ABD: soft, non-tender, normal active bowel sounds  EXTR: pulses intact. No clubbing, cyanosis or edema.   NEURO: alert oriented, speech fluent/appropriate, motor grossly nonfocal    Diagnostics:  All labs and imaging were reviewed, of note:      Lab Results   Component Value Date    TROPI <0.015 08/18/2017    TROPI <0.015 08/15/2017       TTE  Left ventricular function, chamber size, wall motion, and wall thickness are  normal.The EF is 60-65%.  Normal left ventricular filling for age.  The right ventricle is normal size with normal function.  Mild to moderate tricuspid insufficiency is present with two small centrally  directed jets present and hepatic vein systolic flow reversal.  Pulmonary artery systolic pressure is normal.  Dilation of the inferior vena cava is present with abnormal respiratory  variation in diameter.  Estimated mean right atrial pressure is 15 mmHg.  No pericardial effusion is present.     Compared to prior study (8/15/2017), there is now mild-moderate TR with mild  RA dilatation and dilated IVC. Otherwise, findings are similar.    TTE 8/15/17  Interpretation Summary  Global and regional left ventricular function is normal with an EF of 55-60%.  The right ventricle is normal size. Global right ventricular function is  normal.  No significant valvular abnormalities were noted.  Right ventricular systolic pressure is 18mmHg above the right atrial pressure  (Pulmonary artery pressure is normal)  The inferior vena cava was normal in size with preserved respiratory  variability.  No pericardial effusion is present.  Previous study not  available for comparison.    Ming Madera PA-C  [facility] Cardiology Consult Team  Pager [phone] or [phone]

## 2018-01-08 NOTE — PROGRESS NOTES
"María Ortiz is a 22 year old female patient.  1. Gastroenteritis    2. Dehydration    3. Elevated lactic acid level      Past Medical History:   Diagnosis Date     Anxiety      Depressive disorder      IBS (irritable bowel syndrome)      Pulmonary emboli (H)      No current outpatient prescriptions on file.     No Known Allergies  Active Problems:    Gastroenteritis    Blood pressure (!) 138/107, pulse 56, temperature 98  F (36.7  C), temperature source Oral, resp. rate 16, height 1.575 m (5' 2\"), weight 56.2 kg (124 lb), last menstrual period 12/09/2017, SpO2 96 %, not currently breastfeeding.    Subjective:  Symptoms:  Resolved.    Diet:  Poor intake.  She reports  nausea.    Activity level: Normal.    Pain:  She complains of pain that is mild.  She reports pain is improving.  Pain is partially controlled.      Objective:  General Appearance:  Comfortable.    Vital signs: (most recent): Blood pressure (!) 138/107, pulse 56, temperature 98  F (36.7  C), temperature source Oral, resp. rate 16, height 1.575 m (5' 2\"), weight 56.2 kg (124 lb), last menstrual period 12/09/2017, SpO2 96 %, not currently breastfeeding.  Vital signs are normal.    Output: Producing urine.    HEENT: Normal HEENT exam.    Lungs:  Normal respiratory rate and normal effort.  Breath sounds clear to auscultation.    Heart: Normal rate.  Regular rhythm.  S1 normal and S2 normal.    Extremities: Normal range of motion.    Neurological: Patient is alert and oriented to person, place and time.    Skin:  Warm.    Abdomen: Abdomen is soft.  Bowel sounds are normal.   There is no abdominal tenderness.     Pupils:  Pupils are equal, round, and reactive to light.    Pulses: Distal pulses are intact.      Assessment:    Condition: In stable condition.  Improving.   (22 yof with bilateral PE due to BCP 8/2017 on xarelto presents with N/V/D. Initial lactic acid 6-need to follow with rehydration. CT abd right sided failure, Echo the same-most likely due " to PE, Cardiology consult pending.).     Plan:   (Try to wean off narcotic, await stool studies, advance diet as tolerate.).       Ginna De Souza MD  1/8/2018    The pt was seen and examined by myself. The case was reviewed and the plan was discussed with the ELIE.

## 2018-01-08 NOTE — PLAN OF CARE
Problem: Patient Care Overview  Goal: Plan of Care/Patient Progress Review  Outcome: Improving  Outpatient/Observation goals to be met before discharge home:  Nausea/vomiting (diarrhea if present) improved: yes, patient had a small BM this morning  - Tolerating oral intake to maintain hydration: no, patient only taking in minimal amounts of clears  - Vital signs normal or at patient baseline and orthostatic vitals are normal and patient not lightheaded with standing: VSS, denies any dizziness or lightheadedness when standing.  However, patient does feel generalized weakness  - Diagnostic tests and consults completed and resulted if ordered: Stool sample has been sent  - Adequate pain control on oral analgesia: Patient encouraged to not have narcotics.  Patient declined Tylenol and has been using an aqua pad for pain management as well as aromatherapy  - Tolerating oral antibiotics if ordered: n/a  - Safe disposition plan has been identified: to d/c home

## 2018-01-09 VITALS
TEMPERATURE: 98.7 F | HEIGHT: 62 IN | SYSTOLIC BLOOD PRESSURE: 122 MMHG | OXYGEN SATURATION: 96 % | RESPIRATION RATE: 15 BRPM | WEIGHT: 124 LBS | DIASTOLIC BLOOD PRESSURE: 84 MMHG | HEART RATE: 56 BPM | BODY MASS INDEX: 22.82 KG/M2

## 2018-01-09 LAB
ALBUMIN SERPL-MCNC: 4 G/DL (ref 3.4–5)
ALP SERPL-CCNC: 48 U/L (ref 40–150)
ALT SERPL W P-5'-P-CCNC: 194 U/L (ref 0–50)
ANION GAP SERPL CALCULATED.3IONS-SCNC: 9 MMOL/L (ref 3–14)
AST SERPL W P-5'-P-CCNC: 64 U/L (ref 0–45)
BILIRUB SERPL-MCNC: 0.9 MG/DL (ref 0.2–1.3)
BUN SERPL-MCNC: 3 MG/DL (ref 7–30)
CALCIUM SERPL-MCNC: 9.6 MG/DL (ref 8.5–10.1)
CHLORIDE SERPL-SCNC: 104 MMOL/L (ref 94–109)
CO2 SERPL-SCNC: 25 MMOL/L (ref 20–32)
CREAT SERPL-MCNC: 0.71 MG/DL (ref 0.52–1.04)
ERYTHROCYTE [DISTWIDTH] IN BLOOD BY AUTOMATED COUNT: 12.4 % (ref 10–15)
G LAMBLIA AG STL QL IA: NORMAL
GFR SERPL CREATININE-BSD FRML MDRD: >90 ML/MIN/1.7M2
GLUCOSE SERPL-MCNC: 94 MG/DL (ref 70–99)
HCT VFR BLD AUTO: 40.9 % (ref 35–47)
HGB BLD-MCNC: 14.2 G/DL (ref 11.7–15.7)
MAGNESIUM SERPL-MCNC: 2 MG/DL (ref 1.6–2.3)
MCH RBC QN AUTO: 31.6 PG (ref 26.5–33)
MCHC RBC AUTO-ENTMCNC: 34.7 G/DL (ref 31.5–36.5)
MCV RBC AUTO: 91 FL (ref 78–100)
O+P STL MICRO: NORMAL
O+P STL MICRO: NORMAL
PLATELET # BLD AUTO: 169 10E9/L (ref 150–450)
POTASSIUM SERPL-SCNC: 3.7 MMOL/L (ref 3.4–5.3)
PROT SERPL-MCNC: 8 G/DL (ref 6.8–8.8)
RBC # BLD AUTO: 4.5 10E12/L (ref 3.8–5.2)
SODIUM SERPL-SCNC: 138 MMOL/L (ref 133–144)
SPECIMEN SOURCE: NORMAL
SPECIMEN SOURCE: NORMAL
WBC # BLD AUTO: 7.2 10E9/L (ref 4–11)

## 2018-01-09 PROCEDURE — 87329 GIARDIA AG IA: CPT | Performed by: PHYSICIAN ASSISTANT

## 2018-01-09 PROCEDURE — 80053 COMPREHEN METABOLIC PANEL: CPT | Performed by: PHYSICIAN ASSISTANT

## 2018-01-09 PROCEDURE — 83735 ASSAY OF MAGNESIUM: CPT | Performed by: PHYSICIAN ASSISTANT

## 2018-01-09 PROCEDURE — 85027 COMPLETE CBC AUTOMATED: CPT | Performed by: PHYSICIAN ASSISTANT

## 2018-01-09 PROCEDURE — 25000125 ZZHC RX 250: Performed by: PHYSICIAN ASSISTANT

## 2018-01-09 PROCEDURE — 25000125 ZZHC RX 250: Performed by: NURSE PRACTITIONER

## 2018-01-09 PROCEDURE — 25000132 ZZH RX MED GY IP 250 OP 250 PS 637: Performed by: PHYSICIAN ASSISTANT

## 2018-01-09 PROCEDURE — 96376 TX/PRO/DX INJ SAME DRUG ADON: CPT

## 2018-01-09 PROCEDURE — G0378 HOSPITAL OBSERVATION PER HR: HCPCS

## 2018-01-09 PROCEDURE — 25000132 ZZH RX MED GY IP 250 OP 250 PS 637: Performed by: NURSE PRACTITIONER

## 2018-01-09 PROCEDURE — 25000128 H RX IP 250 OP 636: Performed by: PHYSICIAN ASSISTANT

## 2018-01-09 PROCEDURE — 96375 TX/PRO/DX INJ NEW DRUG ADDON: CPT

## 2018-01-09 PROCEDURE — 99217 ZZC OBSERVATION CARE DISCHARGE: CPT | Mod: Z6 | Performed by: PHYSICIAN ASSISTANT

## 2018-01-09 PROCEDURE — 36415 COLL VENOUS BLD VENIPUNCTURE: CPT | Performed by: PHYSICIAN ASSISTANT

## 2018-01-09 RX ORDER — LORAZEPAM 0.5 MG/1
0.5 TABLET ORAL EVERY 8 HOURS PRN
Qty: 20 TABLET | Refills: 0 | Status: SHIPPED | OUTPATIENT
Start: 2018-01-09

## 2018-01-09 RX ORDER — TAMSULOSIN HYDROCHLORIDE 0.4 MG/1
0.4 CAPSULE ORAL DAILY
Status: DISCONTINUED | OUTPATIENT
Start: 2018-01-09 | End: 2018-01-09 | Stop reason: HOSPADM

## 2018-01-09 RX ORDER — ONDANSETRON 4 MG/1
4 TABLET, ORALLY DISINTEGRATING ORAL EVERY 8 HOURS PRN
Qty: 30 TABLET | Refills: 0 | Status: SHIPPED | OUTPATIENT
Start: 2018-01-09

## 2018-01-09 RX ORDER — FUROSEMIDE 10 MG/ML
10 INJECTION INTRAMUSCULAR; INTRAVENOUS ONCE
Status: COMPLETED | OUTPATIENT
Start: 2018-01-09 | End: 2018-01-09

## 2018-01-09 RX ORDER — TAMSULOSIN HYDROCHLORIDE 0.4 MG/1
0.4 CAPSULE ORAL DAILY
Qty: 5 CAPSULE | Refills: 0 | Status: SHIPPED | OUTPATIENT
Start: 2018-01-10

## 2018-01-09 RX ADMIN — PANTOPRAZOLE SODIUM 40 MG: 40 INJECTION, POWDER, FOR SOLUTION INTRAVENOUS at 08:36

## 2018-01-09 RX ADMIN — HYDROXYZINE HYDROCHLORIDE 25 MG: 25 TABLET ORAL at 04:43

## 2018-01-09 RX ADMIN — NORETHINDRONE 0.35 MG: 0.35 TABLET ORAL at 08:37

## 2018-01-09 RX ADMIN — ONDANSETRON 4 MG: 4 TABLET, ORALLY DISINTEGRATING ORAL at 04:45

## 2018-01-09 RX ADMIN — TAMSULOSIN HYDROCHLORIDE 0.4 MG: 0.4 CAPSULE ORAL at 14:36

## 2018-01-09 RX ADMIN — HYDROXYZINE HYDROCHLORIDE 25 MG: 25 TABLET ORAL at 09:44

## 2018-01-09 RX ADMIN — FUROSEMIDE 10 MG: 10 INJECTION, SOLUTION INTRAVENOUS at 14:36

## 2018-01-09 RX ADMIN — ESCITALOPRAM OXALATE 20 MG: 10 TABLET ORAL at 08:35

## 2018-01-09 RX ADMIN — ACETAMINOPHEN 1000 MG: 500 TABLET, FILM COATED ORAL at 04:43

## 2018-01-09 ASSESSMENT — ENCOUNTER SYMPTOMS
NAUSEA: 1
ACTIVITY IMPAIRMENT: NORMAL
SUBJECTIVE PATIENT PAIN CONTROL: WELL CONTROLLED
PAIN SEVERITY NOW: MILD
DIETARY ISSUES: POOR INTAKE
DIARRHEA: 1
SUBJECTIVE PAIN PROGRESSION: IMPROVING

## 2018-01-09 NOTE — PLAN OF CARE
Problem: Patient Care Overview  Goal: Plan of Care/Patient Progress Review  Outpatient/Observation goals to be met before discharge home:    - Nausea/vomiting (diarrhea if present) improved: Yes, patient denies nausea at this time.    - Tolerating oral intake to maintain hydration: No, patient only taking in minimal amounts of clears  - Vital signs normal or at patient baseline and orthostatic vitals are normal and patient not lightheaded with standing: Yes  - Diagnostic tests and consults completed and resulted if ordered: No  - Adequate pain control on oral analgesia: Yes  - Tolerating oral antibiotics if ordered: N/A  - Safe disposition plan has been identified: Pending.

## 2018-01-09 NOTE — PLAN OF CARE
Problem: Patient Care Overview  Goal: Plan of Care/Patient Progress Review  Outpatient/Observation goals to be met before discharge home:    - Nausea/vomiting (diarrhea if present) improved: Yes, patient denies nausea at this time.    - Tolerating oral intake to maintain hydration: Yes.   - Vital signs normal or at patient baseline and orthostatic vitals are normal and patient not lightheaded with standing: Yes  - Diagnostic tests and consults completed and resulted if ordered: No  - Adequate pain control on oral analgesia: Yes  - Tolerating oral antibiotics if ordered: N/A  - Safe disposition plan has been identified: Pending.   Patient unavle to void. Patient straight cathed for 1000 ml. Will continue to monitor for urinary retention.

## 2018-01-09 NOTE — PLAN OF CARE
Problem: Patient Care Overview  Goal: Plan of Care/Patient Progress Review  Outpatient/Observation goals to be met before discharge home:     - Nausea/vomiting (diarrhea if present) improved: Yes, patient denies nausea at this time.    - Tolerating oral intake to maintain hydration:Yes.  - Vital signs normal or at patient baseline and orthostatic vitals are normal and patient not lightheaded with standing: Yes  - Diagnostic tests and consults completed and resulted if ordered: No  - Adequate pain control on oral analgesia: Yes  - Tolerating oral antibiotics if ordered: N/A  - Safe disposition plan has been identified: Pending.  Patient unadle to void. Patient straight cathed for 1000 ml. Will continue to monitor.

## 2018-01-09 NOTE — PLAN OF CARE
Problem: Patient Care Overview  Goal: Plan of Care/Patient Progress Review  - Nausea/vomiting (diarrhea if present) improved: Yes, patient received Zofran for nausea   - Tolerating oral intake to maintain hydration: No, patient only taking in minimal amounts of clears  - Vital signs normal or at patient baseline and orthostatic vitals are normal and patient not lightheaded with standing: Yes  - Diagnostic tests and consults completed and resulted if ordered: No  - Adequate pain control on oral analgesia: Yes  - Tolerating oral antibiotics if ordered: N/A  - Safe disposition plan has been identified: To D/C home when medially stable

## 2018-01-09 NOTE — PLAN OF CARE
Problem: Patient Care Overview  Goal: Plan of Care/Patient Progress Review  Outpatient/Observation goals to be met before discharge home:    1.) Nausea/vomiting (diarrhea if present) improved - No. Still complains of nausea but not as bad, no emesis. IV Zofran and Compazine given x 1 each.  2.) Tolerating oral intake to maintain hydration - No. Attempted a jello and chicken noodle soup but only tolerated sips. Tolerating water.  3.) Vital signs normal or at patient baseline and orthostatic vitals are normal and patient not lightheaded with standing - No. BP slightly elevated earlier today.  4.) Diagnostic tests and consults completed and resulted if ordered - No.  5.) Adequate pain control on oral analgesia - No. Patient continues to complain of abdominal pain. Tylenol given once this evening.  6.) Tolerating oral antibiotics if ordered - No antibiotics ordered.  7.) Safe disposition plan has been identified - Home when medically stable.

## 2018-01-09 NOTE — PROGRESS NOTES
Reviewed and discussed d/c instructions with patient and removed PIV.  All questions were answered.

## 2018-01-09 NOTE — PLAN OF CARE
Problem: Patient Care Overview  Goal: Plan of Care/Patient Progress Review  - Nausea/vomiting (diarrhea if present) improved: No, patient received Compazine for nausea   - Tolerating oral intake to maintain hydration: No, patient only taking in minimal amounts of clears  - Vital signs normal or at patient baseline and orthostatic vitals are normal and patient not lightheaded with standing: Yes  - Diagnostic tests and consults completed and resulted if ordered: No  - Adequate pain control on oral analgesia: No  - Tolerating oral antibiotics if ordered: N/A  - Safe disposition plan has been identified: To D/C home when medially stable

## 2018-01-09 NOTE — PROGRESS NOTES
Patient reported feeling that her bladder was full but she is unable to urinated. Bladder scan resulted in 408 ml. Patient wanted to try again to urinate. Patient attempt to void x 3 over 40 minutes and was still unable to urinate, Patient reports becoming more uncomfortable. NP notified of situation. Order to straight cath given.

## 2018-01-09 NOTE — DISCHARGE SUMMARY
"    [facility]  ED Observation Unit  Discharge Summary    María Ortiz MRN# [id number]   Age: 22 year old YOB: 1995     Date of Admission:  1/6/2018  Date of Discharge:  1/9/2018   Admitting Physician:  Anup Clark MD  Discharging Provider:  Zamzam Mixon PA-C/Ginna De Souza MD  Primary Care Physician: Lindsay Ley          Reason for Admission:   Nausea, vomiting, diarrhea, abdominal pain          Discharge Diagnoses:   1. Abdominal pain, nausea, vomiting, diarrhea, likely 2/2 viral gastroenteritis  2. Hepatomegaly, periportal edema, noted on CT and abd ultrasound, felt likely due to IVF hydration on admission, Cardiology felt unlikely represents RV strain or acute right sided heart failure.  3. Bilateral pulmonary embolism, diagnosed August 2017  4. Acute urinary retention, resolved   5. Depression, anxiety         Brief History of Illness:   Please see the detailed H & P dated 1/6/2018. Briefly, patient is a 22 year old female with a history of bilateral pulmonary embolism (on Xarelto) and IBS who presents to the Emergency Department for evaluation of abdominal pain, vomiting, and diarrhea.  Patient states that she started having nausea, vomiting and diarrhea on the morning of admission.  She reports that she developed abdominal pain that she describes to be all over.  She denies anyone being sick around her at home.  Patient denies vomiting any blood.     In the ED her vitals were stable. Labs showed lactic acid of 6.8, improved to 2.5 with hydration. WBC 11.5. She was given IV antiemetics and IV Dilaudid. She is admitted to observation for gastroenteritis.     On admission to the observation unit the patient was stable. She notes ongoing umbilical abdominal pain. Does not radiate anywhere. + nausea. No vomiting currently.          Physical Exam:   /81 (BP Location: Right arm)  Pulse 56  Temp 98.5  F (36.9  C) (Oral)  Resp 16  Ht 1.575 m (5' 2\")  Wt " 56.2 kg (124 lb)  SpO2 98%  Breastfeeding? No  BMI 22.68 kg/m2  GENERAL: Alert and oriented x 3. NAD.   HEENT: Anicteric sclera. Mucous membranes moist.   CV: RRR. S1, S2. No murmurs appreciated.   RESPIRATORY: Effort normal on room air. Lungs CTAB with no wheezing, rales, rhonchi.   GI: Abdomen soft and non distended with normoactive bowel sounds present in all quadrants. No tenderness, rebound, guarding.   EXTREMITIES: No peripheral edema. Intact bilateral pedal pulses.   SKIN: No jaundice. No rashes.           Labs:   Last CBC:   Recent Labs   Lab Test  01/09/18   0701   WBC  7.2   RBC  4.50   HGB  14.2   HCT  40.9   MCV  91   MCH  31.6   MCHC  34.7   RDW  12.4   PLT  169       Last CMP:  Recent Labs   Lab Test  01/09/18   0701   NA  138   POTASSIUM  3.7   CHLORIDE  104   STALIN  9.6   CO2  25   BUN  3*   CR  0.71   GLC  94   AST  64*   ALT  194*   BILITOTAL  0.9   ALBUMIN  4.0   PROTTOTAL  8.0   ALKPHOS  48            Imaging:   CT abd/pelvis (1/6/18):   1.  Mild hepatomegaly with prominent periportal edema and pericholecystic fluid  as well as dilated IVC and hepatic veins which may be secondary to passive congestion from right heart failure. Hypervolemia and hepatitis are also considerations.  2. Heterogeneous hepatic perfusion with areas of hyperenhancement including an 11mm arterial enhancing focus in the left lobe of liver, likely related to congestive hepatopathy and vascular shunting.   3. Mild bowel wall thickening of jejunal loops in the left mid abdomen. The SMA and SMV remain patent and there is no pneumatosis or free air. Findings may be related to the above or inflammatory/infectious in nature.  4. Suggestion of a unicornuate uterus. Mild physiologic pelvic free fluid.     Limited abd ultrasound (1/6/18):   1.  Nonspecific gallbladder wall thickening measuring up to 6 mm without evidence of cholelithiasis or additional findings to suggest cholecystitis. Findings may be related to passive hepatic  congestion given prior CT findings.    2.  Trace perihepatic ascites.     CT chest pulmonary embolsim (1/7/18):   1. No pulmonary embolism.  2. Previously seen bibasilar opacities, potentially infarct in the setting of prior extensive pulmonary emboli, have decreased in conspicuity.  3. Unchanged sub-5 mm nodule along the left major fissure. No follow-up is necessary given small size and patient's age.    US abd/pelvis (1/7/18):   1. Patent hepatic and perihepatic portal vessels.  2. Mild persistent nonspecific gallbladder wall thickening.  3. A complete abdominal ultrasound examination was not performed.    Echocardiogram (1/7/18):   Left ventricular function, chamber size, wall motion, and wall thickness are normal.The EF is 60-65%.  Normal left ventricular filling for age.  The right ventricle is normal size with normal function.  Mild to moderate tricuspid insufficiency is present with two small centrally directed jets present and hepatic vein systolic flow reversal.  Pulmonary artery systolic pressure is normal.  Dilation of the inferior vena cava is present with abnormal respiratory  variation in diameter.  Estimated mean right atrial pressure is 15 mmHg.  No pericardial effusion is present.     Compared to prior study (8/15/2017), there is now mild-moderate TR with mild RA dilatation and dilated IVC. Otherwise, findings are similar.           Procedures:   None        Consultations:   Hepatology   Cardiology         Hospital Course:   1. Abd pain, nausea, vomiting, diarrhea. Started on morning of hospital admission. Lactic acid initially 6.7, improved with IV hydration. Hemodynamically stable. Normal WBC and Hgb, negative C-diff testing, negative giardia, O&P testing negative. Influenza A/B testing negative. Urine culture negative. CT abd/pelvis noted hepatomegaly and periportal edema. RUQ US with hepatic congestion. Echo with mild-mod TR, normal PA pressure, mild RA diltation and dilated IVF. GI was  consulted given hepatomegaly and symptoms, who felt symptoms likely due to viral/bacterial gastroenteritis, would hope for recovery in 5-7 days. They advised Echocardiogram and Cardiology consult to assess for possible right sided heart failure. Cardiology was consulted and reviewed Echo images, felt likely due to aggressive IVF on admission, felt she would likely self-diurese, no indications for addition of diuretics at this time. Outpatient follow up with primary care advised. She was able to slowly advance her diet and was tolerating normal diet prior to discharge.     2. Hepatic congestion. Elevated AST/ALT. We reviewed Cardiology recommendations and lab findings in detail. She was given Lasix x1 given urinary retention and low urine output, with good effect. We discussed importance of outpatient follow up, with repeat LFTs. If AST/ALT remain elevated, consider further work-up or may need to change from Xarelto to warfarin.     3. Bilateral pulmonary emboli, diagnosed 8/2017. Started on xarelto per hematology, did miss this medication for about 3 weeks due to cost concerns. SW met with patient and provided her with co-pay assistance card. We discussed plan for follow up with hematology in February to review long-term plan.     4. Acute urinary retention. Resolved. On day of discharge, she noted difficulty with urination. Likely due to pain medication and atarax administered earlier in the hospitalization. She had similar difficulties when admitted in August. She was straight cathed x 1. She was given dose of Flomax and Lasix and was able to void on her own prior to discharge. Small quantity of flomax given in event of recurrent difficulties. She was advised to return to the ED or seek care with primary care if further episodes of retention. We did recommend that she stop taking atarax, small quantity of ativan prescribed on discharge.    5. Depression, anxiety. Continue lorazepam. Follow up with primary care to  address ongoing anxiety concerns.           Discharge Medications:     Current Discharge Medication List      START taking these medications    Details   tamsulosin (FLOMAX) 0.4 MG capsule Take 1 capsule (0.4 mg) by mouth daily  Qty: 5 capsule, Refills: 0    Associated Diagnoses: Urinary retention      LORazepam (ATIVAN) 0.5 MG tablet Take 1 tablet (0.5 mg) by mouth every 8 hours as needed for anxiety  Qty: 20 tablet, Refills: 0    Associated Diagnoses: Anxiety         CONTINUE these medications which have CHANGED    Details   rivaroxaban ANTICOAGULANT (XARELTO) 20 MG TABS tablet Take 1 tablet (20 mg) by mouth daily (with dinner)  Qty: 30 tablet, Refills: 0    Associated Diagnoses: Bilateral pulmonary embolism (H)      ondansetron (ZOFRAN-ODT) 4 MG ODT tab Take 1 tablet (4 mg) by mouth every 8 hours as needed for nausea  Qty: 30 tablet, Refills: 0    Associated Diagnoses: Dehydration; Gastroenteritis         CONTINUE these medications which have NOT CHANGED    Details   acetaminophen (TYLENOL) 325 MG tablet Take 325-650 mg by mouth every 6 hours as needed for mild pain      omeprazole (PRILOSEC) 40 MG capsule Take 40 mg by mouth daily      escitalopram (LEXAPRO) 20 MG tablet Take 1 tablet (20 mg) by mouth daily  Qty: 30 tablet, Refills: 0    Associated Diagnoses: Anxiety      norethindrone (MICRONOR) 0.35 MG per tablet Take 1 tablet (0.35 mg) by mouth daily  Qty: 28 tablet, Refills: 1    Associated Diagnoses: Encounter for initial prescription of contraceptive pills      dicyclomine (BENTYL) 10 MG capsule Take 10-20 mg by mouth 2 times daily as needed      senna-docusate (SENOKOT-S;PERICOLACE) 8.6-50 MG per tablet Take 1 tablet by mouth 2 times daily as needed for constipation  Qty: 10 tablet, Refills: 0    Associated Diagnoses: Drug-induced constipation      albuterol (PROAIR HFA/PROVENTIL HFA/VENTOLIN HFA) 108 (90 BASE) MCG/ACT Inhaler Inhale 2 puffs into the lungs every 4 hours as needed for shortness of breath /  dyspnea or wheezing         STOP taking these medications       hydrOXYzine (ATARAX) 25 MG tablet Comments:   Reason for Stopping:         polyethylene glycol (MIRALAX/GLYCOLAX) Packet Comments:   Reason for Stopping:                    Discharge Disposition:   Discharged to home    Code status on discharge: Full Code              Discharge Instructions:     Discharge Procedure Orders  Reason for your hospital stay   Order Comments: You were hospitalized due to abdominal pain, nausea, vomiting and diarrhea. Labs initially elevated, improved with hydration. Stool studies negative for c-difficile, giardia. Urine culture not suggestive of infection. Imagine did show extra fluid near the liver, Cardiology was consulted and felt no intervention needed at this time. You did have difficulty with urine retention, possibly related to Atarax, please limit use going forward. Small quantity of flomax may help with further urine retention. Small quantity of ativan prescribed as alternate to atarax, please follow up with primary care to review further anxiety management.     Follow Up and recommended labs and tests   Order Comments: Follow up with primary care provider, Lindsay Ley, within 7 days for hospital follow-up.  Repeat LFTs at this visit.     Activity   Order Comments: Your activity upon discharge: activity as tolerated   Order Specific Question Answer Comments   Is discharge order? Yes      When to contact your care team   Order Comments: Call your primary doctor if you have any of the following: temperature greater than 100.4F, increased shortness of breath, increased nausea, increased abdominal pain or further difficulty with urination.     Full Code     Diet   Order Comments: Follow this diet upon discharge: Regular diet   Order Specific Question Answer Comments   Is discharge order? Yes           Patient evaluated by Dr. De Souza on day of discharge; in agreement with plan of care.     Zamzam Bolton  Oral  [facility]  Pager: [phone]

## 2018-01-09 NOTE — PROGRESS NOTES
"María Ortiz is a 22 year old female patient.  1. Gastroenteritis    2. Dehydration    3. Elevated lactic acid level      Past Medical History:   Diagnosis Date     Anxiety      Depressive disorder      IBS (irritable bowel syndrome)      Pulmonary emboli (H)      No current outpatient prescriptions on file.     No Known Allergies  Active Problems:    Gastroenteritis    Blood pressure (!) 133/97, pulse 56, temperature 98.5  F (36.9  C), temperature source Oral, resp. rate 16, height 1.575 m (5' 2\"), weight 56.2 kg (124 lb), last menstrual period 12/09/2017, SpO2 96 %, not currently breastfeeding.    Subjective:  Symptoms:  Improved.  She reports diarrhea.    Diet:  Poor intake.    Activity level: Normal.    Pain:  She complains of pain that is mild.  She reports pain is improving.  Pain is partially controlled.      Objective:  General Appearance:  Comfortable.    Vital signs: (most recent): Blood pressure (!) 133/97, pulse 56, temperature 98.5  F (36.9  C), temperature source Oral, resp. rate 16, height 1.575 m (5' 2\"), weight 56.2 kg (124 lb), last menstrual period 12/09/2017, SpO2 96 %, not currently breastfeeding.  Vital signs are normal.    Output: Producing urine.    HEENT: Normal HEENT exam.    Lungs:  Normal respiratory rate and normal effort.  Breath sounds clear to auscultation.    Heart: Normal rate.  Regular rhythm.  S1 normal and S2 normal.    Extremities: Normal range of motion.    Neurological: Patient is alert and oriented to person, place and time.    Skin:  Warm.    Abdomen: Abdomen is soft.  Bowel sounds are normal.   There is no abdominal tenderness.     Pupils:  Pupils are equal, round, and reactive to light.    Pulses: Distal pulses are intact.      Assessment:    Condition: In stable condition.  Improving.   (23 yo f with large PE with BCP on xarelto presents with N/V/D improving and advancing diet. Provided continued progress, anticipate DC in am. Appreciate Card input.)Jennifer Chacko" MD Ap  1/8/2018    The pt was seen and examined by myself. The case was reviewed and the plan was discussed with the ELIE.

## 2018-01-09 NOTE — PROGRESS NOTES
"María Ortiz is a 22 year old female patient.  1. Gastroenteritis    2. Dehydration    3. Elevated lactic acid level      Past Medical History:   Diagnosis Date     Anxiety      Depressive disorder      IBS (irritable bowel syndrome)      Pulmonary emboli (H)      No current outpatient prescriptions on file.     No Known Allergies  Active Problems:    Gastroenteritis    Blood pressure 125/81, pulse 56, temperature 98.5  F (36.9  C), temperature source Oral, resp. rate 16, height 1.575 m (5' 2\"), weight 56.2 kg (124 lb), SpO2 98 %, not currently breastfeeding.    Subjective:  Symptoms:  Improved.  She reports diarrhea.    Diet:  Poor intake.  She reports  nausea.    Activity level: Normal.    Pain:  She complains of pain that is mild.  She reports pain is improving.  Pain is well controlled.      Objective:  General Appearance:  Comfortable.    Vital signs: (most recent): Blood pressure 125/81, pulse 56, temperature 98.5  F (36.9  C), temperature source Oral, resp. rate 16, height 1.575 m (5' 2\"), weight 56.2 kg (124 lb), SpO2 98 %, not currently breastfeeding.  Vital signs are normal.    Output: Producing urine.    HEENT: Normal HEENT exam.    Lungs:  Normal respiratory rate and normal effort.  Breath sounds clear to auscultation.    Heart: Normal rate.  Regular rhythm.  S1 normal and S2 normal.    Extremities: Normal range of motion.    Neurological: Patient is alert and oriented to person, place and time.    Skin:  Warm.    Abdomen: Abdomen is soft.  Bowel sounds are normal.   There is no abdominal tenderness.     Pupils:  Pupils are equal, round, and reactive to light.    Pulses: Distal pulses are intact.      Assessment:    Condition: In stable condition.  Improving.   (22 yof with large PE with BCP on xarelto 8/2017 presents with N/V/D abd cramps, improving with current regimen, toplerating po. If continue to tolerate po this am, plan to DC to home this pm.).       Ginna De Souza MD  1/9/2018    The pt was " seen and examined by myself. The case was reviewed and the plan was discussed with the ELIE.

## 2018-01-09 NOTE — PROGRESS NOTES
Patient reported feeling like her bladder was full again. Patient attempted to void multiple times. Patient was not able to void. 466 ml noted on bladder scan. ELIE notified. 10 mg lasix and 0.4 Flomax ordered. Patient able to void after administration. 115 ml PVR on bladder scan.

## 2018-01-16 ENCOUNTER — HOSPITAL ENCOUNTER (OUTPATIENT)
Facility: CLINIC | Age: 23
Setting detail: OBSERVATION
Discharge: HOME | End: 2018-01-17
Attending: EMERGENCY MEDICINE | Admitting: EMERGENCY MEDICINE
Payer: COMMERCIAL

## 2018-01-16 DIAGNOSIS — K52.9 GASTROENTERITIS: ICD-10-CM

## 2018-01-16 DIAGNOSIS — R11.15 NON-INTRACTABLE CYCLICAL VOMITING WITH NAUSEA: Primary | ICD-10-CM

## 2018-01-16 LAB
ALBUMIN SERPL-MCNC: 4.2 G/DL (ref 3.4–5)
ALP SERPL-CCNC: 55 U/L (ref 40–150)
ALT SERPL W P-5'-P-CCNC: 70 U/L (ref 0–50)
ANION GAP SERPL CALCULATED.3IONS-SCNC: 13 MMOL/L (ref 3–14)
AST SERPL W P-5'-P-CCNC: 34 U/L (ref 0–45)
BASOPHILS # BLD AUTO: 0 10E9/L (ref 0–0.2)
BASOPHILS NFR BLD AUTO: 0.1 %
BILIRUB SERPL-MCNC: 0.4 MG/DL (ref 0.2–1.3)
BUN SERPL-MCNC: 6 MG/DL (ref 7–30)
CALCIUM SERPL-MCNC: 9.7 MG/DL (ref 8.5–10.1)
CHLORIDE SERPL-SCNC: 106 MMOL/L (ref 94–109)
CO2 SERPL-SCNC: 20 MMOL/L (ref 20–32)
CREAT SERPL-MCNC: 0.72 MG/DL (ref 0.52–1.04)
DIFFERENTIAL METHOD BLD: NORMAL
EOSINOPHIL # BLD AUTO: 0.1 10E9/L (ref 0–0.7)
EOSINOPHIL NFR BLD AUTO: 0.9 %
ERYTHROCYTE [DISTWIDTH] IN BLOOD BY AUTOMATED COUNT: 13.3 % (ref 10–15)
GFR SERPL CREATININE-BSD FRML MDRD: >90 ML/MIN/1.7M2
GLUCOSE SERPL-MCNC: 144 MG/DL (ref 70–99)
HCT VFR BLD AUTO: 42.1 % (ref 35–47)
HGB BLD-MCNC: 14.7 G/DL (ref 11.7–15.7)
IMM GRANULOCYTES # BLD: 0 10E9/L (ref 0–0.4)
IMM GRANULOCYTES NFR BLD: 0.1 %
LIPASE SERPL-CCNC: 142 U/L (ref 73–393)
LYMPHOCYTES # BLD AUTO: 1.7 10E9/L (ref 0.8–5.3)
LYMPHOCYTES NFR BLD AUTO: 24.4 %
MCH RBC QN AUTO: 32.1 PG (ref 26.5–33)
MCHC RBC AUTO-ENTMCNC: 34.9 G/DL (ref 31.5–36.5)
MCV RBC AUTO: 92 FL (ref 78–100)
MONOCYTES # BLD AUTO: 0.5 10E9/L (ref 0–1.3)
MONOCYTES NFR BLD AUTO: 6.9 %
NEUTROPHILS # BLD AUTO: 4.6 10E9/L (ref 1.6–8.3)
NEUTROPHILS NFR BLD AUTO: 67.6 %
NRBC # BLD AUTO: 0 10*3/UL
NRBC BLD AUTO-RTO: 0 /100
PLATELET # BLD AUTO: 208 10E9/L (ref 150–450)
POTASSIUM SERPL-SCNC: 3.6 MMOL/L (ref 3.4–5.3)
PROT SERPL-MCNC: 8.5 G/DL (ref 6.8–8.8)
RBC # BLD AUTO: 4.58 10E12/L (ref 3.8–5.2)
SODIUM SERPL-SCNC: 139 MMOL/L (ref 133–144)
WBC # BLD AUTO: 6.8 10E9/L (ref 4–11)

## 2018-01-16 PROCEDURE — 96361 HYDRATE IV INFUSION ADD-ON: CPT | Mod: 59

## 2018-01-16 PROCEDURE — 96374 THER/PROPH/DIAG INJ IV PUSH: CPT

## 2018-01-16 PROCEDURE — 25000125 ZZHC RX 250: Performed by: EMERGENCY MEDICINE

## 2018-01-16 PROCEDURE — 83690 ASSAY OF LIPASE: CPT | Performed by: EMERGENCY MEDICINE

## 2018-01-16 PROCEDURE — 25000128 H RX IP 250 OP 636: Performed by: EMERGENCY MEDICINE

## 2018-01-16 PROCEDURE — 99285 EMERGENCY DEPT VISIT HI MDM: CPT | Mod: Z6 | Performed by: EMERGENCY MEDICINE

## 2018-01-16 PROCEDURE — 85025 COMPLETE CBC W/AUTO DIFF WBC: CPT | Performed by: EMERGENCY MEDICINE

## 2018-01-16 PROCEDURE — 81025 URINE PREGNANCY TEST: CPT | Performed by: EMERGENCY MEDICINE

## 2018-01-16 PROCEDURE — 80053 COMPREHEN METABOLIC PANEL: CPT | Performed by: EMERGENCY MEDICINE

## 2018-01-16 PROCEDURE — 96372 THER/PROPH/DIAG INJ SC/IM: CPT

## 2018-01-16 PROCEDURE — 99285 EMERGENCY DEPT VISIT HI MDM: CPT | Mod: 25

## 2018-01-16 RX ORDER — OLANZAPINE 10 MG/2ML
5 INJECTION, POWDER, FOR SOLUTION INTRAMUSCULAR ONCE
Status: COMPLETED | OUTPATIENT
Start: 2018-01-16 | End: 2018-01-16

## 2018-01-16 RX ORDER — ONDANSETRON 2 MG/ML
4 INJECTION INTRAMUSCULAR; INTRAVENOUS ONCE
Status: COMPLETED | OUTPATIENT
Start: 2018-01-16 | End: 2018-01-16

## 2018-01-16 RX ADMIN — ONDANSETRON 4 MG: 2 INJECTION INTRAMUSCULAR; INTRAVENOUS at 23:05

## 2018-01-16 RX ADMIN — OLANZAPINE 5 MG: 10 INJECTION, POWDER, LYOPHILIZED, FOR SOLUTION INTRAMUSCULAR at 22:41

## 2018-01-16 RX ADMIN — SODIUM CHLORIDE, POTASSIUM CHLORIDE, SODIUM LACTATE AND CALCIUM CHLORIDE 1000 ML: 600; 310; 30; 20 INJECTION, SOLUTION INTRAVENOUS at 23:54

## 2018-01-16 RX ADMIN — SODIUM CHLORIDE, POTASSIUM CHLORIDE, SODIUM LACTATE AND CALCIUM CHLORIDE 1000 ML: 600; 310; 30; 20 INJECTION, SOLUTION INTRAVENOUS at 23:02

## 2018-01-16 ASSESSMENT — ENCOUNTER SYMPTOMS
SHORTNESS OF BREATH: 0
CHILLS: 0
VOMITING: 1
FEVER: 0
NERVOUS/ANXIOUS: 1
COUGH: 0
DIARRHEA: 1
NAUSEA: 1
ABDOMINAL PAIN: 1

## 2018-01-16 NOTE — IP AVS SNAPSHOT
[address] Observation [address] [address]    Phone:  [phone]    Fax:  [phone]                                       After Visit Summary   1/16/2018    María Ortiz    MRN: [id number]           After Visit Summary Signature Page     I have received my discharge instructions, and my questions have been answered. I have discussed any challenges I see with this plan with the nurse or doctor.    ..........................................................................................................................................  Patient/Patient Representative Signature      ..........................................................................................................................................  Patient Representative Print Name and Relationship to Patient    ..................................................               ................................................  Date                                            Time    ..........................................................................................................................................  Reviewed by Signature/Title    ...................................................              ..............................................  Date                                                            Time

## 2018-01-16 NOTE — LETTER
January 17, 2018      To Whom It May Concern:      María Ortiz was admitted to [facility] on 01/17/18.  Please excuse her absence from work today due to illness.  She may return to work/school when improved.    Sincerely,        Anayeli Gramajo PA-C

## 2018-01-16 NOTE — IP AVS SNAPSHOT
MRN:[id number]                      After Visit Summary   1/16/2018    María Ortiz    MRN: [id number]           Thank you!     Thank you for choosing [facility] for your care. Our goal is always to provide you with excellent care. Hearing back from our patients is one way we can continue to improve our services. Please take a few minutes to complete the written survey that you may receive in the mail after you visit with us. Thank you!        Patient Information     Date Of Birth          1995        About your hospital stay     You were admitted on:  January 17, 2018 You last received care in the:  Unit 6D Observation [facility]    You were discharged on:  January 17, 2018        Reason for your hospital stay       Nausea, vomiting, diarrhea                  Who to Call     For medical emergencies, please call 911.  For non-urgent questions about your medical care, please call your primary care provider or clinic, [phone]          Attending Provider     Provider Specialty    Nancy Skaggs MD Emergency Medicine    [place], Juancho Jacome MD Emergency Medicine    [facility], Fide Mendoza MD Emergency Medicine       Primary Care Provider Office Phone # Fax #    Lindsay Ley [phone]       When to contact your care team       Call your PCP to schedule follow up appointment.  See your PCP if again developing nausea, vomiting, diarrhea, worsening anxiety, or other new symptoms.  Return to ER immediately if severe abdominal pain, bloody vomit or bloody stools, shortness of breath/chest pain, inability to tolerate oral intake, thoughts of harming yourself of others, or any other new concerning symptoms.                  After Care Instructions     Activity       Your activity upon discharge: same as prior activity            Diet       Follow this diet upon discharge: advance as tolerated, pushing clear liquid intake            Discharge Instructions       You were  admitted to ED observation for nausea, vomiting, and diarrhea. Your laboratory studies were generally within normal limits. Your liver function tests had improved since your last visit- your ALT was still slightly higher than normal at 70 (normal is 0-50), however previously it was much higher, and your other labs were all normal. Your urine showed no signs of a UTI.   You received treatment for your nausea and vomiting including IV fluids and antiemetics. You were able to tolerate oral intake.  Upon discharge, please resume all your home medications.  Push clear liquid intake.  Follow up with your primary care provider for a hospital follow up.   Return to the ER immediately if severe abdominal pain, bloody vomit or bloody stools, shortness of breath/chest pain, inability to tolerate oral intake, thoughts of harming yourself of others, or any other new concerning symptoms.                  Follow-up Appointments     Follow Up and recommended labs and tests       Follow up with primary care provider, Lindsay Ley, within 7 days for hospital follow- up.  Further testing as needed, no specific testing recommended at this point.                  Further instructions from your care team       Thank you for coming to the [facility] Emergency Department.     Continue your medications as prescribed.     Continue to avoid marijuana use. This can cause a syndrome of abdominal pain, nausea and vomiting, similar to your symptoms today.     Please follow up with your primary care clinic in the next few days.       Pending Results     No orders found for last 3 day(s).            Statement of Approval     Ordered          01/17/18 1031  I have reviewed and agree with all the recommendations and orders detailed in this document.  EFFECTIVE NOW     Approved and electronically signed by:  Anayeli Gramajo PA-C             Admission Information     Date & Time Provider Department Dept. Phone     "2018 Fide Waterman MD Unit 6D Observation [facility] [place] [phone]      Your Vitals Were     Blood Pressure Pulse Temperature Respirations Height Weight    113/71 (BP Location: Left arm) 66 98.7  F (37.1  C) (Oral) 18 1.575 m (5' 2\") 56.7 kg (125 lb)    Last Period Pulse Oximetry BMI (Body Mass Index)             2018 98% 22.86 kg/m2         [employer] Information     [employer] lets you send messages to your doctor, view your test results, renew your prescriptions, schedule appointments and more. To sign up, go to [email or web address] . Click on \"Log in\" on the left side of the screen, which will take you to the Welcome page. Then click on \"Sign up Now\" on the right side of the page.     You will be asked to enter the access code listed below, as well as some personal information. Please follow the directions to create your username and password.     Your access code is: [email or web address]  Expires: 3/9/2018  5:53 PM     Your access code will  in 90 days. If you need help or a new code, please call your [facility] clinic or [phone].        Care EveryWhere ID     This is your Care EveryWhere ID. This could be used by other organizations to access your [facility] medical records  [id number]        Equal Access to Services     AJIT [facility] SystemPAULY AH: Hadii malena gallardo hadasho Soinocente, waaxda luqadaha, qaybta kaalmada adeegyada, juan diego cordova . So [facility] [phone].    ATENCIÓN: Si habla español, tiene a reardon disposición servicios gratuitos de asistencia lingüística. Llame al [phone].    We comply with applicable federal civil rights laws and [place] laws. We do not discriminate on the basis of race, color, national origin, age, disability, sex, sexual orientation, or gender identity.               Review of your medicines      CONTINUE these medicines which have NOT CHANGED        Dose / Directions    acetaminophen 325 MG tablet   Commonly known as:  TYLENOL        Dose:  " 325-650 mg   Take 325-650 mg by mouth every 6 hours as needed for mild pain   Refills:  0       albuterol 108 (90 BASE) MCG/ACT Inhaler   Commonly known as:  PROAIR HFA/PROVENTIL HFA/VENTOLIN HFA        Dose:  2 puff   Inhale 2 puffs into the lungs every 4 hours as needed for shortness of breath / dyspnea or wheezing   Refills:  0       dicyclomine 10 MG capsule   Commonly known as:  BENTYL        Dose:  10-20 mg   Take 10-20 mg by mouth 2 times daily as needed   Refills:  0       escitalopram 20 MG tablet   Commonly known as:  LEXAPRO   Used for:  Anxiety        Dose:  20 mg   Take 1 tablet (20 mg) by mouth daily   Quantity:  30 tablet   Refills:  0       LORazepam 0.5 MG tablet   Commonly known as:  ATIVAN   Used for:  Anxiety        Dose:  0.5 mg   Take 1 tablet (0.5 mg) by mouth every 8 hours as needed for anxiety   Quantity:  20 tablet   Refills:  0       norethindrone 0.35 MG per tablet   Commonly known as:  MICRONOR   Used for:  Encounter for initial prescription of contraceptive pills        Dose:  1 tablet   Take 1 tablet (0.35 mg) by mouth daily   Quantity:  28 tablet   Refills:  1       omeprazole 40 MG capsule   Commonly known as:  priLOSEC        Dose:  40 mg   Take 40 mg by mouth daily   Refills:  0       ondansetron 4 MG ODT tab   Commonly known as:  ZOFRAN-ODT   Used for:  Gastroenteritis        Dose:  4 mg   Take 1 tablet (4 mg) by mouth every 8 hours as needed for nausea   Quantity:  30 tablet   Refills:  0       rivaroxaban ANTICOAGULANT 20 MG Tabs tablet   Commonly known as:  XARELTO   Used for:  Bilateral pulmonary embolism (H)        Dose:  20 mg   Take 1 tablet (20 mg) by mouth daily (with dinner)   Quantity:  30 tablet   Refills:  0       tamsulosin 0.4 MG capsule   Commonly known as:  FLOMAX   Used for:  Urinary retention        Dose:  0.4 mg   Take 1 capsule (0.4 mg) by mouth daily   Quantity:  5 capsule   Refills:  0                Protect others around you: Learn how to safely use, store  and throw away your medicines at www.disposemymeds.org.             Medication List: This is a list of all your medications and when to take them. Check marks below indicate your daily home schedule. Keep this list as a reference.      Medications           Morning Afternoon Evening Bedtime As Needed    acetaminophen 325 MG tablet   Commonly known as:  TYLENOL   Take 325-650 mg by mouth every 6 hours as needed for mild pain                                albuterol 108 (90 BASE) MCG/ACT Inhaler   Commonly known as:  PROAIR HFA/PROVENTIL HFA/VENTOLIN HFA   Inhale 2 puffs into the lungs every 4 hours as needed for shortness of breath / dyspnea or wheezing                                dicyclomine 10 MG capsule   Commonly known as:  BENTYL   Take 10-20 mg by mouth 2 times daily as needed                                escitalopram 20 MG tablet   Commonly known as:  LEXAPRO   Take 1 tablet (20 mg) by mouth daily   Last time this was given:  20 mg on 1/17/2018  8:46 AM                                LORazepam 0.5 MG tablet   Commonly known as:  ATIVAN   Take 1 tablet (0.5 mg) by mouth every 8 hours as needed for anxiety                                norethindrone 0.35 MG per tablet   Commonly known as:  MICRONOR   Take 1 tablet (0.35 mg) by mouth daily                                omeprazole 40 MG capsule   Commonly known as:  priLOSEC   Take 40 mg by mouth daily                                ondansetron 4 MG ODT tab   Commonly known as:  ZOFRAN-ODT   Take 1 tablet (4 mg) by mouth every 8 hours as needed for nausea                                rivaroxaban ANTICOAGULANT 20 MG Tabs tablet   Commonly known as:  XARELTO   Take 1 tablet (20 mg) by mouth daily (with dinner)                                tamsulosin 0.4 MG capsule   Commonly known as:  FLOMAX   Take 1 capsule (0.4 mg) by mouth daily

## 2018-01-17 VITALS
SYSTOLIC BLOOD PRESSURE: 113 MMHG | RESPIRATION RATE: 18 BRPM | OXYGEN SATURATION: 98 % | TEMPERATURE: 98.7 F | WEIGHT: 125 LBS | HEIGHT: 62 IN | DIASTOLIC BLOOD PRESSURE: 71 MMHG | BODY MASS INDEX: 23 KG/M2 | HEART RATE: 66 BPM

## 2018-01-17 PROBLEM — R11.2 NAUSEA & VOMITING: Status: ACTIVE | Noted: 2018-01-17

## 2018-01-17 PROBLEM — R11.10 VOMITING AND DIARRHEA: Status: ACTIVE | Noted: 2018-01-17

## 2018-01-17 PROBLEM — R19.7 VOMITING AND DIARRHEA: Status: ACTIVE | Noted: 2018-01-17

## 2018-01-17 PROBLEM — R11.10 VOMITING: Status: ACTIVE | Noted: 2018-01-17

## 2018-01-17 LAB
ALBUMIN UR-MCNC: NEGATIVE MG/DL
AMPHETAMINES UR QL SCN: NEGATIVE
APPEARANCE UR: CLEAR
BARBITURATES UR QL: NEGATIVE
BENZODIAZ UR QL: NEGATIVE
BILIRUB UR QL STRIP: NEGATIVE
CANNABINOIDS UR QL SCN: POSITIVE
COCAINE UR QL: NEGATIVE
COLOR UR AUTO: ABNORMAL
ETHANOL UR QL SCN: NEGATIVE
GLUCOSE UR STRIP-MCNC: NEGATIVE MG/DL
HCG UR QL: NEGATIVE
HGB UR QL STRIP: ABNORMAL
INTERNAL QC OK POCT: YES
KETONES UR STRIP-MCNC: 10 MG/DL
LEUKOCYTE ESTERASE UR QL STRIP: NEGATIVE
MUCOUS THREADS #/AREA URNS LPF: PRESENT /LPF
NITRATE UR QL: NEGATIVE
OPIATES UR QL SCN: NEGATIVE
PH UR STRIP: 8 PH (ref 5–7)
RBC #/AREA URNS AUTO: 3 /HPF (ref 0–2)
SOURCE: ABNORMAL
SP GR UR STRIP: 1 (ref 1–1.03)
SQUAMOUS #/AREA URNS AUTO: <1 /HPF (ref 0–1)
UROBILINOGEN UR STRIP-MCNC: NORMAL MG/DL (ref 0–2)
WBC #/AREA URNS AUTO: <1 /HPF (ref 0–2)

## 2018-01-17 PROCEDURE — 80320 DRUG SCREEN QUANTALCOHOLS: CPT | Performed by: EMERGENCY MEDICINE

## 2018-01-17 PROCEDURE — 25000125 ZZHC RX 250: Performed by: NURSE PRACTITIONER

## 2018-01-17 PROCEDURE — 25000128 H RX IP 250 OP 636: Performed by: EMERGENCY MEDICINE

## 2018-01-17 PROCEDURE — 99235 HOSP IP/OBS SAME DATE MOD 70: CPT | Mod: Z6 | Performed by: NURSE PRACTITIONER

## 2018-01-17 PROCEDURE — 96375 TX/PRO/DX INJ NEW DRUG ADDON: CPT

## 2018-01-17 PROCEDURE — 25000132 ZZH RX MED GY IP 250 OP 250 PS 637: Performed by: NURSE PRACTITIONER

## 2018-01-17 PROCEDURE — 80307 DRUG TEST PRSMV CHEM ANLYZR: CPT | Performed by: EMERGENCY MEDICINE

## 2018-01-17 PROCEDURE — 81001 URINALYSIS AUTO W/SCOPE: CPT | Performed by: EMERGENCY MEDICINE

## 2018-01-17 PROCEDURE — G0378 HOSPITAL OBSERVATION PER HR: HCPCS

## 2018-01-17 RX ORDER — LACTOBACILLUS RHAMNOSUS GG 10B CELL
1 CAPSULE ORAL 2 TIMES DAILY
Status: DISCONTINUED | OUTPATIENT
Start: 2018-01-17 | End: 2018-01-17 | Stop reason: HOSPADM

## 2018-01-17 RX ORDER — LIDOCAINE 40 MG/G
CREAM TOPICAL
Status: DISCONTINUED | OUTPATIENT
Start: 2018-01-17 | End: 2018-01-17 | Stop reason: HOSPADM

## 2018-01-17 RX ORDER — ACETAMINOPHEN 325 MG/1
325-650 TABLET ORAL EVERY 6 HOURS PRN
Status: DISCONTINUED | OUTPATIENT
Start: 2018-01-17 | End: 2018-01-17 | Stop reason: HOSPADM

## 2018-01-17 RX ORDER — METOCLOPRAMIDE 10 MG/1
10 TABLET ORAL EVERY 6 HOURS PRN
Status: DISCONTINUED | OUTPATIENT
Start: 2018-01-17 | End: 2018-01-17 | Stop reason: HOSPADM

## 2018-01-17 RX ORDER — ALBUTEROL SULFATE 90 UG/1
2 AEROSOL, METERED RESPIRATORY (INHALATION) EVERY 4 HOURS PRN
Status: DISCONTINUED | OUTPATIENT
Start: 2018-01-17 | End: 2018-01-17 | Stop reason: HOSPADM

## 2018-01-17 RX ORDER — LORAZEPAM 0.5 MG/1
0.5 TABLET ORAL EVERY 8 HOURS PRN
Status: DISCONTINUED | OUTPATIENT
Start: 2018-01-17 | End: 2018-01-17 | Stop reason: HOSPADM

## 2018-01-17 RX ORDER — METOCLOPRAMIDE HYDROCHLORIDE 5 MG/ML
10 INJECTION INTRAMUSCULAR; INTRAVENOUS EVERY 6 HOURS PRN
Status: DISCONTINUED | OUTPATIENT
Start: 2018-01-17 | End: 2018-01-17 | Stop reason: HOSPADM

## 2018-01-17 RX ORDER — ESCITALOPRAM OXALATE 20 MG/1
20 TABLET ORAL DAILY
Status: DISCONTINUED | OUTPATIENT
Start: 2018-01-17 | End: 2018-01-17 | Stop reason: HOSPADM

## 2018-01-17 RX ORDER — ONDANSETRON 2 MG/ML
4 INJECTION INTRAMUSCULAR; INTRAVENOUS EVERY 6 HOURS PRN
Status: DISCONTINUED | OUTPATIENT
Start: 2018-01-17 | End: 2018-01-17 | Stop reason: HOSPADM

## 2018-01-17 RX ORDER — DICYCLOMINE HYDROCHLORIDE 10 MG/1
10 CAPSULE ORAL 2 TIMES DAILY PRN
Status: DISCONTINUED | OUTPATIENT
Start: 2018-01-17 | End: 2018-01-17 | Stop reason: HOSPADM

## 2018-01-17 RX ORDER — ACETAMINOPHEN AND CODEINE PHOSPHATE 120; 12 MG/5ML; MG/5ML
1 SOLUTION ORAL DAILY
Status: DISCONTINUED | OUTPATIENT
Start: 2018-01-17 | End: 2018-01-17 | Stop reason: HOSPADM

## 2018-01-17 RX ORDER — ONDANSETRON 4 MG/1
4 TABLET, ORALLY DISINTEGRATING ORAL EVERY 6 HOURS PRN
Status: DISCONTINUED | OUTPATIENT
Start: 2018-01-17 | End: 2018-01-17 | Stop reason: HOSPADM

## 2018-01-17 RX ADMIN — PROCHLORPERAZINE EDISYLATE 10 MG: 5 INJECTION INTRAMUSCULAR; INTRAVENOUS at 03:03

## 2018-01-17 RX ADMIN — ESCITALOPRAM OXALATE 20 MG: 20 TABLET ORAL at 08:46

## 2018-01-17 RX ADMIN — Medication 1 CAPSULE: at 08:47

## 2018-01-17 RX ADMIN — PANTOPRAZOLE SODIUM 40 MG: 40 INJECTION, POWDER, FOR SOLUTION INTRAVENOUS at 08:46

## 2018-01-17 ASSESSMENT — ENCOUNTER SYMPTOMS
DIETARY ISSUES: ADEQUATE INTAKE
NO PATIENT REPORTED PAIN: 1
ACTIVITY IMPAIRMENT: NORMAL

## 2018-01-17 NOTE — PROGRESS NOTES
- Nausea/vomiting (diarrhea if present) improved- YES  - Tolerating oral intake to maintain hydration-YES  - Vital signs normal or at patient baseline-YES  - Diagnostic tests and consults completed and resulted if ordered-YES  - Adequate pain control on oral analgesia-YES  - Tolerating oral antibiotics if ordered-N/A  - Safe disposition plan has been identified-YES    Diet advanced to regular diet. Patient tolerated 50% of breakfast and pointing out that she is not a breakfast person. PT denies NVD or abdominal pain.

## 2018-01-17 NOTE — DISCHARGE INSTRUCTIONS
Thank you for coming to the [facility] Emergency Department.     Continue your medications as prescribed.     Continue to avoid marijuana use. This can cause a syndrome of abdominal pain, nausea and vomiting, similar to your symptoms today.     Please follow up with your primary care clinic in the next few days.

## 2018-01-17 NOTE — DISCHARGE SUMMARY
Discharge Summary    María Ortiz MRN# [id number]   YOB: 1995 Age: 22 year old     Date of Admission:  1/16/2018  Date of Discharge:  1/17/2018  Admitting Physician:  Nancy Skaggs MD  Discharge Physician:  Adina De Souza MD  Discharging Service:  ED observation     Primary Provider: Lindsay Ley          Discharge Diagnosis:     Vomiting    Nausea & vomiting    Vomiting and diarrhea    * No resolved hospital problems. *               Discharge Disposition:   Discharged to home           Condition on Discharge:   Discharge condition: Good   Code status on discharge: Full Code           Procedures:   No procedures performed during this admission          Discharge Medications:     Current Discharge Medication List      CONTINUE these medications which have NOT CHANGED    Details   rivaroxaban ANTICOAGULANT (XARELTO) 20 MG TABS tablet Take 1 tablet (20 mg) by mouth daily (with dinner)  Qty: 30 tablet, Refills: 0    Associated Diagnoses: Bilateral pulmonary embolism (H)      ondansetron (ZOFRAN-ODT) 4 MG ODT tab Take 1 tablet (4 mg) by mouth every 8 hours as needed for nausea  Qty: 30 tablet, Refills: 0    Associated Diagnoses: Dehydration; Gastroenteritis      tamsulosin (FLOMAX) 0.4 MG capsule Take 1 capsule (0.4 mg) by mouth daily  Qty: 5 capsule, Refills: 0    Associated Diagnoses: Urinary retention      LORazepam (ATIVAN) 0.5 MG tablet Take 1 tablet (0.5 mg) by mouth every 8 hours as needed for anxiety  Qty: 20 tablet, Refills: 0    Associated Diagnoses: Anxiety      acetaminophen (TYLENOL) 325 MG tablet Take 325-650 mg by mouth every 6 hours as needed for mild pain      dicyclomine (BENTYL) 10 MG capsule Take 10-20 mg by mouth 2 times daily as needed      omeprazole (PRILOSEC) 40 MG capsule Take 40 mg by mouth daily      escitalopram (LEXAPRO) 20 MG tablet Take 1 tablet (20 mg) by mouth daily  Qty: 30 tablet, Refills: 0    Associated Diagnoses: Anxiety      norethindrone (MICRONOR) 0.35  MG per tablet Take 1 tablet (0.35 mg) by mouth daily  Qty: 28 tablet, Refills: 1    Associated Diagnoses: Encounter for initial prescription of contraceptive pills      albuterol (PROAIR HFA/PROVENTIL HFA/VENTOLIN HFA) 108 (90 BASE) MCG/ACT Inhaler Inhale 2 puffs into the lungs every 4 hours as needed for shortness of breath / dyspnea or wheezing                   Consultations:   No consultations were requested during this admission             Brief History of Illness:   María Ortiz is a 22 year old female patient who presented to the ED with nausea, vomiting, and diarrhea, in the setting of recent workup and observation admission without clear etiology, perhaps gastroenteritis versus anxiety. Today, she was admitted to ED Obs with continued nausea and feelings of panic attack.           Hospital Course:   #.  Nausea, vomiting and diarrhea: ED visit 1/6/18 for NVD with admission to the ED Obs unit, discharged 1/9/18. Onset of sx's today, 1/17/18, at 1530, with 5 to 6 episodes of vomiting, and two episodes of non bloody diarrhea. Sx's today similar to previous episode. Was doing well from 1/9 until now. Denies marijuana use in the past two weeks. Urine tox positive for cannabis. Recent testing for c diff, enteric MALIHA, and giardia negative. HCG negative, , lipase normal, CBC normal, previously elevated LFT's improved (ALT 70, others within normal limits). CT of abdomen 1/6/18 showed mild hepatomegaly, perhaps hypervolemia, mild bowel thickening, unicornuate uterus (See CT report for full details). Abdominal US on 1/6/18 showed nonspecific gb wall thickening without cholelithiasis, or cholecystitis (see full report).   Due to her inability to tolerate PO intake in the ED, she was admitted to ED observation for antiemetics and serial examination.  She required no further antiemetics once getting to the unit, and was able to tolerate PO intake - ate breakfast in the morning. She reported feeling much better and had no  "further emesis or diarrhea. She attributed her symptoms to possible anxiety. She appeared well at discharge and exam continued to be benign. Recommended she follow up with her PCP for ongoing management of anxiety.      #. Anxiety, Panic Attacks: Past history of panic attacks. States hasn't used the ativan in the past few days. She continued on her home Lexapro 20 mg daily. She was given Zyprexa in the ED, for vomiting and anxiety. She did not require any PRN Ativan. She reported her moods as stable and appeared psychiatrically stable while admitted.       #. Bilateral PE: Dx 8/15/17. Likely 2/2 estrogen based OCP for dysmenorrhea.  1/7/18 ECHO was negative for right heart strain. Plan is for Xarelto for 6 months. She is to meet with hematology in 6 months for final anticoagulation plan. Now, she is on non estrogen OCP, awaiting IUD. Currently has menses with normal bleeding. No chest pain, SOB, no calf or leg pain/swelling. Continued Xarelto while admitted. Platelets and Hgb stable.     #. GERD: At home, takes omeprazole 40 mg daily AC breakfast. Continued Protonix while admitted. Differential for N/V does include gastritis/ulcer, however recent negative EGD and colonoscopy 9/5/2017. On discharge, continue home omeprazole.          Final Day of Progress before Discharge:       Physical Exam:  Blood pressure 113/71, pulse 66, temperature 98.7  F (37.1  C), temperature source Oral, resp. rate 18, height 1.575 m (5' 2\"), weight 56.7 kg (125 lb), last menstrual period 01/16/2018, SpO2 98 %, not currently breastfeeding.    General: alert, appears comfortable, NAD. Afebrile.  Skin: no suspicious lesions or rashes   Head: Normocephalic.   EENT: Sclera anicteric.  Oropharynx: MMM.   Respiratory: No distress. Equal inspiration to bilateral bases. Lungs CTAB- no crackles, wheezes, or rales.  Cardiovascular: RRR. No murmurs, clicks gallops or rub.   Gastrointestinal: Abdomen soft, nondistended. Mild LLQ tenderness to " palpation without guarding, rigidity. BS normal. No masses, organomegaly.   Neurologic: Follows commands. Gait normal.  Psychiatric: mentation appears normal and affect normal/bright. Judgement intact.           Data:  All laboratory data reviewed             Significant Results:     Results for orders placed or performed during the hospital encounter of 01/16/18   CBC with platelets differential   Result Value Ref Range    WBC 6.8 4.0 - 11.0 10e9/L    RBC Count 4.58 3.8 - 5.2 10e12/L    Hemoglobin 14.7 11.7 - 15.7 g/dL    Hematocrit 42.1 35.0 - 47.0 %    MCV 92 78 - 100 fl    MCH 32.1 26.5 - 33.0 pg    MCHC 34.9 31.5 - 36.5 g/dL    RDW 13.3 10.0 - 15.0 %    Platelet Count 208 150 - 450 10e9/L    Diff Method Automated Method     % Neutrophils 67.6 %    % Lymphocytes 24.4 %    % Monocytes 6.9 %    % Eosinophils 0.9 %    % Basophils 0.1 %    % Immature Granulocytes 0.1 %    Nucleated RBCs 0 0 /100    Absolute Neutrophil 4.6 1.6 - 8.3 10e9/L    Absolute Lymphocytes 1.7 0.8 - 5.3 10e9/L    Absolute Monocytes 0.5 0.0 - 1.3 10e9/L    Absolute Eosinophils 0.1 0.0 - 0.7 10e9/L    Absolute Basophils 0.0 0.0 - 0.2 10e9/L    Abs Immature Granulocytes 0.0 0 - 0.4 10e9/L    Absolute Nucleated RBC 0.0    Comprehensive metabolic panel   Result Value Ref Range    Sodium 139 133 - 144 mmol/L    Potassium 3.6 3.4 - 5.3 mmol/L    Chloride 106 94 - 109 mmol/L    Carbon Dioxide 20 20 - 32 mmol/L    Anion Gap 13 3 - 14 mmol/L    Glucose 144 (H) 70 - 99 mg/dL    Urea Nitrogen 6 (L) 7 - 30 mg/dL    Creatinine 0.72 0.52 - 1.04 mg/dL    GFR Estimate >90 >60 mL/min/1.7m2    GFR Estimate If Black >90 >60 mL/min/1.7m2    Calcium 9.7 8.5 - 10.1 mg/dL    Bilirubin Total 0.4 0.2 - 1.3 mg/dL    Albumin 4.2 3.4 - 5.0 g/dL    Protein Total 8.5 6.8 - 8.8 g/dL    Alkaline Phosphatase 55 40 - 150 U/L    ALT 70 (H) 0 - 50 U/L    AST 34 0 - 45 U/L   Lipase   Result Value Ref Range    Lipase 142 73 - 393 U/L   UA with Microscopic reflex to Culture   Result  Value Ref Range    Color Urine Light Yellow     Appearance Urine Clear     Glucose Urine Negative NEG^Negative mg/dL    Bilirubin Urine Negative NEG^Negative    Ketones Urine 10 (A) NEG^Negative mg/dL    Specific Gravity Urine 1.005 1.003 - 1.035    Blood Urine Moderate (A) NEG^Negative    pH Urine 8.0 (H) 5.0 - 7.0 pH    Protein Albumin Urine Negative NEG^Negative mg/dL    Urobilinogen mg/dL Normal 0.0 - 2.0 mg/dL    Nitrite Urine Negative NEG^Negative    Leukocyte Esterase Urine Negative NEG^Negative    Source Midstream Urine     WBC Urine <1 0 - 2 /HPF    RBC Urine 3 (H) 0 - 2 /HPF    Squamous Epithelial /HPF Urine <1 0 - 1 /HPF    Mucous Urine Present (A) NEG^Negative /LPF   Drug abuse screen 6 urine (tox)   Result Value Ref Range    Amphetamine Qual Urine Negative NEG^Negative    Barbiturates Qual Urine Negative NEG^Negative    Benzodiazepine Qual Urine Negative NEG^Negative    Cannabinoids Qual Urine Positive (A) NEG^Negative    Cocaine Qual Urine Negative NEG^Negative    Ethanol Qual Urine Negative NEG^Negative    Opiates Qualitative Urine Negative NEG^Negative   hCG qual urine POCT   Result Value Ref Range    HCG Qual Urine Negative neg    Internal QC OK Yes       No results found for this or any previous visit (from the past 48 hour(s)).             Pending Results:   Unresulted Labs Ordered in the Past 30 Days of this Admission     No orders found for last 61 day(s).                  Discharge Instructions and Follow-Up:     Discharge Procedure Orders  Reason for your hospital stay   Order Comments: Nausea, vomiting, diarrhea     Follow Up and recommended labs and tests   Order Comments: Follow up with primary care provider, Lindsay Ley, within 7 days for hospital follow- up.  Further testing as needed, no specific testing recommended at this point.     Activity   Order Comments: Your activity upon discharge: same as prior activity   Order Specific Question Answer Comments   Is discharge order? Yes       When to contact your care team   Order Comments: Call your PCP to schedule follow up appointment.  See your PCP if again developing nausea, vomiting, diarrhea, worsening anxiety, or other new symptoms.  Return to ER immediately if severe abdominal pain, bloody vomit or bloody stools, shortness of breath/chest pain, inability to tolerate oral intake, thoughts of harming yourself of others, or any other new concerning symptoms.     Discharge Instructions   Order Comments: You were admitted to ED observation for nausea, vomiting, and diarrhea. Your laboratory studies were generally within normal limits. Your liver function tests had improved since your last visit- your ALT was still slightly higher than normal at 70 (normal is 0-50), however previously it was much higher, and your other labs were all normal. Your urine showed no signs of a UTI.   You received treatment for your nausea and vomiting including IV fluids and antiemetics. You were able to tolerate oral intake.  Upon discharge, please resume all your home medications.  Push clear liquid intake.  Follow up with your primary care provider for a hospital follow up.   Return to the ER immediately if severe abdominal pain, bloody vomit or bloody stools, shortness of breath/chest pain, inability to tolerate oral intake, thoughts of harming yourself of others, or any other new concerning symptoms.     Full Code     Diet   Order Comments: Follow this diet upon discharge: advance as tolerated, pushing clear liquid intake   Order Specific Question Answer Comments   Is discharge order? Yes             Attestation:  Anayeli Gramajo PA-C  01/17/18

## 2018-01-17 NOTE — PLAN OF CARE
"Problem: Patient Care Overview  Goal: Plan of Care/Patient Progress Review  Outpatient/Observation goals to be met before discharge home:  - Nausea/vomiting (diarrhea if present) improved: Improving per pt report   - Tolerating oral intake to maintain hydration>: Tolerating sips of water and ice chips  - Vital signs normal or at patient baseline and orthostatic vitals are normal and patient not lightheaded with standing : Yes  - Diagnostic tests and consults completed and resulted if ordered :In process   - Adequate pain control on oral analgesia : Yes. Pain at comfortable level per pt report   - Tolerating oral antibiotics if ordered:  N/A   - Safe disposition plan has been identified : To prior living condition when stable   /59  Pulse 62  Temp 98.2  F (36.8  C) (Oral)  Resp 18  Ht 1.575 m (5' 2\")  Wt 56.7 kg (125 lb)  LMP 01/16/2018  SpO2 98%  BMI 22.86 kg/m2        "

## 2018-01-17 NOTE — ED NOTES
Pt complains of nausea, vomiting and diarrhea since this afternoon. She says she started her period today and is having cramps as well. She also feels like she may be having a panic attack.

## 2018-01-17 NOTE — H&P
Admission Date: 01/17/18  Attending Physician: Fide Waterman MD  Primary Care Physician: Lindsay Ley  NP/PA: LIZANDRO Nava, CNP    REASON FOR ADMISSION:     Chief Complaint   Patient presents with     Nausea, Vomiting, & Diarrhea     Abdominal Pain     and menstrual cramping     History of Present Illness, per ER MD: ARNULFO  María Ortiz is a 22 year old female with a history of bilateral pulmonary embolism (on Xarelto), IBS, anxiety, and depression who presents to the Emergency Department for evaluation of abdominal pain, nausea, vomiting, and diarrhea. Patient was seen here in the ED on 1/6/18 and was admitted to the observation unit and discharged on 1/9/18 for similar symptoms. Today in the ED, she is reports abdominal pain, nausea and vomiting since about 3:30 PM today (vomited 5-6 times) and diarrhea, which stopped at about 4:00 PM today. She also notes that she feels like she is having a panic attack and that she has had these in the past and was evaluated by her PCP today for this.     ED Course: Patient was given 2 liters of IVF, Zofran 4 mg IV, phenergan 25 mg, and Zyprexa 5 mg. Patient had a panic attack in the ER. She improved after receiving Zyprexa.   Observation Unit Arrival: 0430, nausea controlled. Resting, sleepy. Awakens for admission.     REVIEW OF SYSTEMS:  CONSTITUTIONAL: Generally felt well until today Did have fever earlier today she thinks, no chills, sweats, fatigue, weakness, weight loss, or appetite changes.  SKIN: Denies rash, itching, bruising, new lumps or bumps, ecchymosis, hair changes or nail changes.  EYES: Denies visual changes, blurred vision, double vision, or eye pain  EARS/NOSE/THROAT: Denies hearing loss, tinnitus, sinus pressure/drainage, PND, nasal congestion, runny nose, epistaxis, sore throat/mouth pain, change in taste, ear pain, bleeding gums, or hoarseness.  RESPIRATORY: Denies dyspnea at rest or with activity, cough, or  hemoptysis.  CARDIOVASCULAR: Denies palpitations, chest pain/pressure, orthopnea, edema or open areas on extremities.  GASTROINTESTINAL: Good appetite and PO intake until today. Denies dysphagia, has GERD, has nausea, vomiting, denies abdominal pain, constipation. Has non bloody diarrhea.  GENITOURINARY: Denies dysuria, frequency, urgency, hesitancy, hematuria, or incontinence  MUSCULOSKELTAL: Denies muscle/joint pain and weakness  NEUROLOGIC: Denies headaches, dizziness, numbness or tingling of hands and feet, confusion, memory changes, lightheadedness/dizziness or difficulties with balance.  PSYCHIATRIC: Has anxiety, denies depression, mental status changes, or change in mood.  HEME/LYMPH: Denies active bleeding, swollen nodes  VASCULAR ACCESS: Denies pain, redness, or discharge.  PAST MEDICAL HISTORY:  Past Medical History:   Diagnosis Date     Anxiety      Depressive disorder      IBS (irritable bowel syndrome)      Pulmonary emboli (H)      PAST SURGICAL HISTORY:  Past Surgical History:   Procedure Laterality Date     ORTHOPEDIC SURGERY      R shoulder 2012     SOCIAL HISTORY:    FAMILY HISTORY:    ALLERGIES:   No Known Allergies    MEDICATIONS:    No current facility-administered medications on file prior to encounter.   Current Outpatient Prescriptions on File Prior to Encounter:  rivaroxaban ANTICOAGULANT (XARELTO) 20 MG TABS tablet Take 1 tablet (20 mg) by mouth daily (with dinner)   ondansetron (ZOFRAN-ODT) 4 MG ODT tab Take 1 tablet (4 mg) by mouth every 8 hours as needed for nausea   tamsulosin (FLOMAX) 0.4 MG capsule Take 1 capsule (0.4 mg) by mouth daily   LORazepam (ATIVAN) 0.5 MG tablet Take 1 tablet (0.5 mg) by mouth every 8 hours as needed for anxiety   acetaminophen (TYLENOL) 325 MG tablet Take 325-650 mg by mouth every 6 hours as needed for mild pain   dicyclomine (BENTYL) 10 MG capsule Take 10-20 mg by mouth 2 times daily as needed   omeprazole (PRILOSEC) 40 MG capsule Take 40 mg by mouth daily    escitalopram (LEXAPRO) 20 MG tablet Take 1 tablet (20 mg) by mouth daily   norethindrone (MICRONOR) 0.35 MG per tablet Take 1 tablet (0.35 mg) by mouth daily   senna-docusate (SENOKOT-S;PERICOLACE) 8.6-50 MG per tablet Take 1 tablet by mouth 2 times daily as needed for constipation   albuterol (PROAIR HFA/PROVENTIL HFA/VENTOLIN HFA) 108 (90 BASE) MCG/ACT Inhaler Inhale 2 puffs into the lungs every 4 hours as needed for shortness of breath / dyspnea or wheezing     PHYSICAL EXAM:   112/65, T: 98.1, P: 76, R: 18    All vital signs were reviewed.  GENERAL APPEARENCE: Pleasant, generally appears well and adequately hydrated, A/O x4. NAD.  SKIN: Clean, dry, and intact without visible lesions, rash, jaundice, cyanosis, erythema, ecchymoses to exposed areas.  HEENT: NCAT w/out masses, lesions, or abnormalities. Sclera anicteric, PERRLA, EOMI.  Oral mucosa pink and moist without erythema, exudate, lesions, ulcerations, or thrush. Teeth and gums normal.    NECK: Supple, no masses. No jugular venous distention.   CARDIOVASCULAR: S1, S2 RRR. No murmurs, rubs, or gallops.   RESPIRATORY: Respiratory effort WNL. CTA  bilaterally without crackles/rales/wheeze   GI: BS present in all 4 quadrants. Abdomen soft and non-tender to exam. No masses or hepatosplenomegaly.  : Deferred  MUSCULOSKELETAL: Gait is steady. Strength 5/5 in major muscle groups of bilateral UE and LE.  Extremities normal, no gross deformities noted, non-tender to palpation.   PV: 2+ bilateral radial and pedal pulses. No edema noted.   NEURO: CN II-XII grossly intact. Speech normal. Appropriate throughout interview.   Sensation grossly WNL. Finger to nose and rapid alternating movements WDL.  PSYCH: Appropriate affect  HEME/LYMPH: No visible bleeding. No palpable mandibular/cervical/supra/infraclavicular lymph nodes  PSYCHIATRIC: Mentation and affect appear normal  VASCULAR ACCESS: CDI without erythema or discharge. Non-tender.    DATA :  Results for orders  placed or performed during the hospital encounter of 01/16/18 (from the past 24 hour(s))   CBC with platelets differential   Result Value Ref Range    WBC 6.8 4.0 - 11.0 10e9/L    RBC Count 4.58 3.8 - 5.2 10e12/L    Hemoglobin 14.7 11.7 - 15.7 g/dL    Hematocrit 42.1 35.0 - 47.0 %    MCV 92 78 - 100 fl    MCH 32.1 26.5 - 33.0 pg    MCHC 34.9 31.5 - 36.5 g/dL    RDW 13.3 10.0 - 15.0 %    Platelet Count 208 150 - 450 10e9/L    Diff Method Automated Method     % Neutrophils 67.6 %    % Lymphocytes 24.4 %    % Monocytes 6.9 %    % Eosinophils 0.9 %    % Basophils 0.1 %    % Immature Granulocytes 0.1 %    Nucleated RBCs 0 0 /100    Absolute Neutrophil 4.6 1.6 - 8.3 10e9/L    Absolute Lymphocytes 1.7 0.8 - 5.3 10e9/L    Absolute Monocytes 0.5 0.0 - 1.3 10e9/L    Absolute Eosinophils 0.1 0.0 - 0.7 10e9/L    Absolute Basophils 0.0 0.0 - 0.2 10e9/L    Abs Immature Granulocytes 0.0 0 - 0.4 10e9/L    Absolute Nucleated RBC 0.0    Comprehensive metabolic panel   Result Value Ref Range    Sodium 139 133 - 144 mmol/L    Potassium 3.6 3.4 - 5.3 mmol/L    Chloride 106 94 - 109 mmol/L    Carbon Dioxide 20 20 - 32 mmol/L    Anion Gap 13 3 - 14 mmol/L    Glucose 144 (H) 70 - 99 mg/dL    Urea Nitrogen 6 (L) 7 - 30 mg/dL    Creatinine 0.72 0.52 - 1.04 mg/dL    GFR Estimate >90 >60 mL/min/1.7m2    GFR Estimate If Black >90 >60 mL/min/1.7m2    Calcium 9.7 8.5 - 10.1 mg/dL    Bilirubin Total 0.4 0.2 - 1.3 mg/dL    Albumin 4.2 3.4 - 5.0 g/dL    Protein Total 8.5 6.8 - 8.8 g/dL    Alkaline Phosphatase 55 40 - 150 U/L    ALT 70 (H) 0 - 50 U/L    AST 34 0 - 45 U/L   Lipase   Result Value Ref Range    Lipase 142 73 - 393 U/L   UA with Microscopic reflex to Culture   Result Value Ref Range    Color Urine Light Yellow     Appearance Urine Clear     Glucose Urine Negative NEG^Negative mg/dL    Bilirubin Urine Negative NEG^Negative    Ketones Urine 10 (A) NEG^Negative mg/dL    Specific Gravity Urine 1.005 1.003 - 1.035    Blood Urine Moderate (A)  NEG^Negative    pH Urine 8.0 (H) 5.0 - 7.0 pH    Protein Albumin Urine Negative NEG^Negative mg/dL    Urobilinogen mg/dL Normal 0.0 - 2.0 mg/dL    Nitrite Urine Negative NEG^Negative    Leukocyte Esterase Urine Negative NEG^Negative    Source Midstream Urine     WBC Urine <1 0 - 2 /HPF    RBC Urine 3 (H) 0 - 2 /HPF    Squamous Epithelial /HPF Urine <1 0 - 1 /HPF    Mucous Urine Present (A) NEG^Negative /LPF   Drug abuse screen 6 urine (tox)   Result Value Ref Range    Amphetamine Qual Urine Negative NEG^Negative    Barbiturates Qual Urine Negative NEG^Negative    Benzodiazepine Qual Urine Negative NEG^Negative    Cannabinoids Qual Urine Positive (A) NEG^Negative    Cocaine Qual Urine Negative NEG^Negative    Ethanol Qual Urine Negative NEG^Negative    Opiates Qualitative Urine Negative NEG^Negative   hCG qual urine POCT   Result Value Ref Range    HCG Qual Urine Negative neg    Internal QC OK Yes        ASSESSMENT: 22 year old female patient with NVD, recent workup without clear etiology, perhaps gastroenteritis. Admitted to ED Obs with continued nausea and feelings of panic attack.     PLAN: Admit to ED observation for IV hydration and nausea control. Expect discharge home when tolerating PO fluids and a diet.     #.  Nausea, vomiting and diarrhea: ED visit 1/6/18 for NVD with admission to the ED Obs unit, discharged 1/9/18. Onset of sx's today at 1530, with 5 to 6 episodes of vomiting, and two episodes of non bloody diarrhea. Sx's today are similar as previous episode. Was doing well from 1/9 until now. Denies marijuana use in the past two weeks. Urine tox positive for cannabis. Recent testing for c diff, enteric MALIHA, and giardia negative. HCG negative, , lipase normal, CBC normal, previously elevated LFT's improved. CT of abdomen 1/6/18 showed mild hepatomegaly, perhaps hypervolemia, mild bowel thickening, unicornuate uterus (See CT report for full details). Abdominal US on 1/6/18 showed nonspecific gb wall  thickening without cholelithiasis, or cholecystitis (see full report).   -Antiemetics.   -2 liters of LR in the ED, assess if needs more IV fluid this morning-omkar if more diarrhea or vomiting.   -No current vomiting or diarrhea  -Voided upon arrival to 6D    #. Anxiety, Panic Attacks: past history of panic attacks. States hasn't used the ativan in the past few days.   -Lexapro 20 mg daily  -Zyprexa in the ED, for vomiting and anxiety  -Ativan PRN as ordered.     #. Bilateral PE: Likely 2/2 estrogen based BCP for dysmenorrhea. Dx 8/15/17. ECHO was negative for right heart strain. Plan is for Xarelto for 6 months. She is to meet with hematology in 6 months, for final anticoagulation plan. She is on non estrogen BCP, awaiting IUD. Currently has menses with normal bleeding. No chest pain, SOB, no calf or leg pain/swelling.   -Xarelto 20 mg once daily with supper  -No abnormal bleeding. , Hgb 14.7    #. GERD:   -Takes Omeprazole 40 mg daily AC breakfast  - Protonix 40 mg IV once daily    FEN:  -Advance diet as tolerated.  -Monitor BMP and replace electrolytes per protocol    Prophy:  -No VTE prophy as patient is on Xarelto.   -Encourage ambulation as tolerated   -for GI prophy, takes Omeprazole, change to protonix IV  -Hold Senna and Miralax    Consults: None at present    CODE STATUS:  FULL CODE      DISPOSITION: Home when tolerating PO fluids and taking a diet.     Maday Humphrey, APRN, CNP  Emergency Department Observation Unit

## 2018-01-17 NOTE — ED NOTES
22-year-old female who presents with nausea vomiting and abdominal pain.  María was signed out to me at change of shift to monitor improvement after receiving antiemetics.  She had continued nausea and was given IV Compazine.  After this she still felt nauseated and unable to take p.o.. She will be admitted observation or antiemetics and hydration.  Her exam is benign and there is no evidence of serious intra-abdominal pathology.     Juancho Holden MD  01/17/18 [id number]

## 2018-01-17 NOTE — PROGRESS NOTES
"María Ortiz is a 22 year old female patient.  1. Non-intractable cyclical vomiting with nausea    2. Gastroenteritis      Past Medical History:   Diagnosis Date     Anxiety      Depressive disorder      IBS (irritable bowel syndrome)      Pulmonary emboli (H)      No current outpatient prescriptions on file.     No Known Allergies  Active Problems:    Vomiting    Nausea & vomiting    Vomiting and diarrhea    Blood pressure 113/71, pulse 66, temperature 98.7  F (37.1  C), temperature source Oral, resp. rate 18, height 1.575 m (5' 2\"), weight 56.7 kg (125 lb), last menstrual period 01/16/2018, SpO2 98 %, not currently breastfeeding.    Subjective:  Symptoms:  Resolved.    Diet:  Adequate intake.    Activity level: Normal.    Pain:  She reports no pain.      Objective:  General Appearance:  Comfortable.    Vital signs: (most recent): Blood pressure 113/71, pulse 66, temperature 98.7  F (37.1  C), temperature source Oral, resp. rate 18, height 1.575 m (5' 2\"), weight 56.7 kg (125 lb), last menstrual period 01/16/2018, SpO2 98 %, not currently breastfeeding.  Vital signs are normal.    Output: Producing urine.    HEENT: Normal HEENT exam.    Lungs:  Normal respiratory rate and normal effort.  Breath sounds clear to auscultation.    Heart: Normal rate.  Regular rhythm.  S1 normal and S2 normal.    Extremities: Normal range of motion.    Neurological: Patient is alert and oriented to person, place and time.    Skin:  Warm.    Abdomen: Abdomen is soft.  Bowel sounds are normal.   There is no abdominal tenderness.     Pupils:  Pupils are equal, round, and reactive to light.    Pulses: Distal pulses are intact.      Assessment:    Condition: In stable condition.  Improving.   (23 yo f with h/o large PE with BCP still on xarelto presents with another episode of N/V/D now better and tolerating po. Cannabis use too-discussed the possibility of cannabis hyperemesis as well.).     Plan:   Discharge home.        Ginna De Souza" MD  1/17/2018    The pt was seen and examined by myself. The case was reviewed and the plan was discussed with the ELIE.

## 2018-01-17 NOTE — PROGRESS NOTES
DC instructions given to pt, verbalized understanding.  All belongings with pt, IV DC'd and documented.

## 2018-01-17 NOTE — ED PROVIDER NOTES
History     Chief Complaint   Patient presents with     Nausea, Vomiting, & Diarrhea     Abdominal Pain     and menstrual cramping     HPI  María Ortiz is a 22 year old female with a history of bilateral pulmonary embolism (on Xarelto), IBS, anxiety, and depression who presents to the Emergency Department for evaluation of abdominal pain, nausea, vomiting, and diarrhea. Patient was seen here in the ED on 1/6/18 and was admitted to the observation unit and discharged on 1/9/18 for similar symptoms. Today in the ED, she is reports abdominal pain, nausea and vomiting since about 3:30 PM today (vomited 5-6 times) and diarrhea, which stopped at about 4:00 PM today. She also notes that she feels like she is having a panic attack and that she has had these in the past and was evaluated by her PCP today for this.    Past Medical History:   Diagnosis Date     Anxiety      Depressive disorder      IBS (irritable bowel syndrome)      Pulmonary emboli (H)        Past Surgical History:   Procedure Laterality Date     ORTHOPEDIC SURGERY      R shoulder 2012       No family history on file.    Social History   Substance Use Topics     Smoking status: Never Smoker     Smokeless tobacco: Never Used     Alcohol use Yes      Comment: social       No current facility-administered medications for this encounter.      Current Outpatient Prescriptions   Medication     rivaroxaban ANTICOAGULANT (XARELTO) 20 MG TABS tablet     ondansetron (ZOFRAN-ODT) 4 MG ODT tab     tamsulosin (FLOMAX) 0.4 MG capsule     LORazepam (ATIVAN) 0.5 MG tablet     acetaminophen (TYLENOL) 325 MG tablet     dicyclomine (BENTYL) 10 MG capsule     omeprazole (PRILOSEC) 40 MG capsule     escitalopram (LEXAPRO) 20 MG tablet     norethindrone (MICRONOR) 0.35 MG per tablet     albuterol (PROAIR HFA/PROVENTIL HFA/VENTOLIN HFA) 108 (90 BASE) MCG/ACT Inhaler      No Known Allergies      I have reviewed the Medications, Allergies, Past Medical and Surgical History, and  "Social History in the Epic system.    Review of Systems   Constitutional: Negative for chills and fever.   HENT: Negative.    Respiratory: Negative for cough and shortness of breath.    Gastrointestinal: Positive for abdominal pain, diarrhea, nausea and vomiting.   Genitourinary: Positive for vaginal bleeding.   Skin: Negative.    Hematological:        No abnormal bleeding.    Psychiatric/Behavioral: The patient is nervous/anxious.    All other systems reviewed and are negative.      Physical Exam   BP: (!) 137/99  Pulse: 76  Temp: 98.1  F (36.7  C)  Resp: 18  Height: 157.5 cm (5' 2\")  Weight: 56.7 kg (125 lb)  SpO2: 100 %      Physical Exam   Gen:A&Ox3, anxious and hyperventilating  HEENT:PERRL, no facial tenderness or wounds, head atraumatic, oropharynx clear, mucous membranes moist, TMs clear bilaterally  Neck:no bony tenderness or step offs, no JVD, trachea midline  Back: no CVA tenderness, no midline bony tenderness  CV:RRR without murmurs  PULM:Clear to auscultation bilaterally  Abd:soft, nontender, nondistended. Bowel sounds present and normal  UE:No traumatic injuries, skin normal  LE:no traumatic injuries, skin normal, no LE edema or calf tenderness  Neuro:CN II-XII intact, strength 5/5 throughout,  Skin: no rashes or ecchymoses      ED Course   10:32 PM  The patient was seen and examined by Nancy Skaggs MD in Room 9.     ED Course     Procedures    Critical Care time:  none  Labs Ordered and Resulted from Time of ED Arrival Up to the Time of Departure from the ED   COMPREHENSIVE METABOLIC PANEL - Abnormal; Notable for the following:        Result Value    Glucose 144 (*)     Urea Nitrogen 6 (*)     ALT 70 (*)     All other components within normal limits   ROUTINE UA WITH MICROSCOPIC REFLEX TO CULTURE - Abnormal; Notable for the following:     Ketones Urine 10 (*)     Blood Urine Moderate (*)     pH Urine 8.0 (*)     RBC Urine 3 (*)     Mucous Urine Present (*)     All other components within normal " limits   DRUG ABUSE SCREEN 6 CHEM DEP URINE ([facility]) - Abnormal; Notable for the following:     Cannabinoids Qual Urine Positive (*)     All other components within normal limits   HCG QUAL URINE POCT - Normal   CBC WITH PLATELETS DIFFERENTIAL   LIPASE   PERIPHERAL IV CATHETER       Assessments & Plan (with Medical Decision Making)       21 yo F with IBS, pulmonary embolism and anxiety disorder.  Here with nausea, vomiting and diarrhea.  Recurrence of symptoms similar to a recent admit to the hospital from 1/6-1/9.  Vital stable.   Abdominal exam without focal tenderness.  IV access obtained laboratory testing done.   CBC within normal limits.  Comment the metabolic panel with improvement in previously elevated LFTs.  Retinae normal.  Lipase normal.  bHCG negative.  Patient was very anxious and hyperventilating, consistent with her past panic attacks.  She has been compliant with Xarelto recently and is without chest pain, tachycardia, hypoxia, lower extremity DVT findings.  I have a very low suspicion for recurrent PE.  Recent testing for c diff, enteric MALIHA, and giardia negative.   DDx included gastroparesis, gastroenteritis, cannabinoid hyperemesis.   Treated with 5mg IM zyprexa and 4mg IV zofran, 2L of LR.     12AM  Pt reassessed. Calmer and no return of vomiting.     12:55 AM  UA pending.  PO challenge  Signed out to Dr. Holden. Anticipating discharge when she is able to take PO.         I have reviewed the nursing notes.    I have reviewed the findings, diagnosis, plan and need for follow up with the patient.    Discharge Medication List as of 1/17/2018 11:11 AM          Final diagnoses:   Gastroenteritis   Non-intractable cyclical vomiting with nausea   IMartha, am serving as a trained medical scribe to document services personally performed by Nancy Skaggs MD, based on the provider's statements to me.      I, Nancy Skaggs MD, was physically present and have reviewed and verified the accuracy of this  note documented by Martha Wheatley.       1/16/2018   [facility], [place], EMERGENCY DEPARTMENT    MD CASSI Staton Katrina Anne, MD  01/23/18 [id number]

## 2021-05-31 ENCOUNTER — RECORDS - HEALTHEAST (OUTPATIENT)
Dept: ADMINISTRATIVE | Facility: CLINIC | Age: 26
End: 2021-05-31

## 2021-06-01 ENCOUNTER — RECORDS - HEALTHEAST (OUTPATIENT)
Dept: ADMINISTRATIVE | Facility: CLINIC | Age: 26
End: 2021-06-01

## 2021-06-02 ENCOUNTER — RECORDS - HEALTHEAST (OUTPATIENT)
Dept: ADMINISTRATIVE | Facility: CLINIC | Age: 26
End: 2021-06-02